# Patient Record
Sex: FEMALE | Race: BLACK OR AFRICAN AMERICAN | NOT HISPANIC OR LATINO | Employment: FULL TIME | URBAN - METROPOLITAN AREA
[De-identification: names, ages, dates, MRNs, and addresses within clinical notes are randomized per-mention and may not be internally consistent; named-entity substitution may affect disease eponyms.]

---

## 2017-01-09 ENCOUNTER — OFFICE VISIT (OUTPATIENT)
Dept: FAMILY MEDICINE | Facility: CLINIC | Age: 47
End: 2017-01-09
Payer: COMMERCIAL

## 2017-01-09 VITALS
OXYGEN SATURATION: 99 % | BODY MASS INDEX: 35.82 KG/M2 | DIASTOLIC BLOOD PRESSURE: 80 MMHG | HEART RATE: 72 BPM | TEMPERATURE: 98 F | SYSTOLIC BLOOD PRESSURE: 128 MMHG | HEIGHT: 63 IN | WEIGHT: 202.19 LBS | RESPIRATION RATE: 12 BRPM

## 2017-01-09 DIAGNOSIS — E78.5 HYPERLIPIDEMIA, UNSPECIFIED HYPERLIPIDEMIA TYPE: Primary | ICD-10-CM

## 2017-01-09 DIAGNOSIS — Z11.3 SCREEN FOR STD (SEXUALLY TRANSMITTED DISEASE): ICD-10-CM

## 2017-01-09 PROCEDURE — 1159F MED LIST DOCD IN RCRD: CPT | Mod: S$GLB,,, | Performed by: FAMILY MEDICINE

## 2017-01-09 PROCEDURE — 99999 PR PBB SHADOW E&M-EST. PATIENT-LVL III: CPT | Mod: PBBFAC,,, | Performed by: FAMILY MEDICINE

## 2017-01-09 PROCEDURE — 99213 OFFICE O/P EST LOW 20 MIN: CPT | Mod: S$GLB,,, | Performed by: FAMILY MEDICINE

## 2017-01-09 PROCEDURE — 3074F SYST BP LT 130 MM HG: CPT | Mod: S$GLB,,, | Performed by: FAMILY MEDICINE

## 2017-01-09 PROCEDURE — 3079F DIAST BP 80-89 MM HG: CPT | Mod: S$GLB,,, | Performed by: FAMILY MEDICINE

## 2017-01-09 RX ORDER — PANTOPRAZOLE SODIUM 40 MG/1
TABLET, DELAYED RELEASE ORAL
Refills: 1 | COMMUNITY
Start: 2016-12-20 | End: 2018-03-05

## 2017-01-09 NOTE — PROGRESS NOTES
Subjective:       Patient ID: Pat Leigh is a 46 y.o. female.    Chief Complaint: Hypertension    HPI:  Here to discuss labs results.  Patient with elevated cholesterol panel for several years.  Has chronic stable hypertension.  Does not monitor diet.   has Hep C.  Would like to be screened for HIV.  re  Review of Systems   Cardiovascular: Negative.    Genitourinary: Negative.        Objective:      Physical Exam   Constitutional: She appears well-developed and well-nourished.   Neurological: She is alert.     Lab Results   Component Value Date    CHOL 232 (H) 11/21/2016    CHOL 260 (H) 08/03/2016    CHOL 227 (H) 02/18/2015     Lab Results   Component Value Date    HDL 59 11/21/2016    HDL 68 08/03/2016    HDL 61 02/18/2015     Lab Results   Component Value Date    LDLCALC 153.0 11/21/2016    LDLCALC 169.8 (H) 08/03/2016    LDLCALC 146.2 02/18/2015     Lab Results   Component Value Date    TRIG 100 11/21/2016    TRIG 111 08/03/2016    TRIG 99 02/18/2015     Lab Results   Component Value Date    CHOLHDL 25.4 11/21/2016    CHOLHDL 26.2 08/03/2016    CHOLHDL 26.9 02/18/2015         Results for orders placed or performed in visit on 12/05/16   HEPATITIS PANEL, ACUTE   Result Value Ref Range    Hepatitis B Surface Ag Negative     Hep B C IgM Negative     Hep A IgM Negative     Hepatitis C Ab Negative      Assessment:       1. Hyperlipidemia, unspecified hyperlipidemia type    2. Screen for STD (sexually transmitted disease)        Plan:       Hyperlipidemia, unspecified hyperlipidemia type  Discussed importance of diet and exercise.  Low fat diet given.  LDL goal <130 due to hypertension    Screen for STD (sexually transmitted disease)  -     HIV-1 and HIV-2 antibodies; Future; Expected date: 1/9/17            No Follow-up on file.

## 2017-01-09 NOTE — PATIENT INSTRUCTIONS
Eating Heart-Healthy Foods  Eating has a big impact on your heart health. In fact, eating healthier can improve several of your heart risks at once. For instance, it helps you manage weight, cholesterol, and blood pressure. Here are ideas to help you make heart-healthy changes without giving up all the foods and flavors you love.  Getting started  · Talk with your health care provider about eating plans, such as the DASH or Mediterranean diet. You may also be referred to a dietitian.  · Change a few things at a time. Give yourself time to get used to a few eating changes before adding more.  · Work to create a tasty, healthy eating plan that you can stick to for the rest of your life.    Goals for healthy eating  Below are some tips to improve your eating habits:  · Limit saturated fats and trans fats. Saturated fats raise your levels of cholesterol, so keep these fats to a minimum. They are found in foods such as fatty meats, whole milk, cheese, and palm and coconut oils. Avoid trans fats because they lower good cholesterol as well as raise bad cholesterol. Trans fats are most often found in processed foods.  · Reduce sodium (salt) intake. Eating too much salt may increase your blood pressure. Limit your sodium intake to 2,300 milligrams (mg) per day, or less if your health care provider recommends it. Dining out less often and eating fewer processed foods are two great ways to decrease the amount of salt you consume.  · Managing calories. A calorie is a unit of energy. Your body burns calories for fuel, but if you eat more calories than your body burns, the extras are stored as fat. Your health care provider can help you create a diet plan to manage your calories. This will likely include eating healthier foods as well as exercising regularly. To help you track your progress, keep a diary to record what you eat and how often you exercise.  Choose the right foods  Aim to make these foods staples of your diet. If  you have diabetes, you may have different recommendations than what is listed here:  · Fruits and vegetable provide plenty of nutrients without a lot of calories. At meals, fill half your plate with these foods. Split the other half of your plate between whole grains and lean protein.  · Whole grains are high in fiber and rich in vitamins and nutrients. Good choices include whole-wheat bread, pasta, and brown rice.  · Lean proteins give you nutrition with less fat. Good choices include fish, skinless chicken, and beans.  · Low-fat or nonfat dairy provides nutrients without a lot of fat. Try low-fat or nonfat milk, cheese, or yogurt.  · Healthy fats can be good for you in small amounts. These are unsaturated fats, such as olive oil, nuts, and fish. Try to have at least 2 servings per week of fatty fish such as salmon, sardines, mackerel, rainbow trout, and albacore tuna. These contain omega-3 fatty acids, which are good for your heart. Flaxseed is another source of a heart-healthy fat.  More on heart healthy eating    Read food labels  Healthy eating starts at the grocery store. Be sure to pay attention to food labels on packaged foods. Look for products that are high in fiber and protein, and low in saturated fat, cholesterol, and sodium. Avoid products that contain trans fat. And pay close attention to serving size. For instance, if you plan to eat two servings, double all the numbers on the label.  Prepare food right  A key part of healthy cooking is cutting down on added fat and salt. Look on the internet for lower-fat, lower-sodium recipes. Also, try these tips:  · Remove fat from meat and skin from poultry before cooking.  · Skim fat from the surface of soups and sauces.  · Broil, boil, bake, steam, grill, and microwave food without added fats.  · Choose ingredients that spice up your food without adding calories, fat, or sodium. Try these items: horseradish, hot sauce, lemon, mustard, nonfat salad dressings,  and vinegar. For salt-free herbs and spices, try basil, cilantro, cinnamon, pepper, and rosemary.  © 6517-7922 [employer]. [address] [place] [address], [place] [address]. All rights reserved. This information is not intended as a substitute for professional medical care. Always follow your healthcare professional's instructions.        Low-Fat Diet    A low-fat diet will help you lose weight. It also can lower cholesterol and prevent symptoms of gallbladder disease. The average American diet contains up to 50% fat. This means that 50% of all calories come from fat (80 grams to 100 grams of fat per day). Choosing normal portions of foods from the list below can help lower your fat intake. The [place] [employer] recommends 20% to 35% of your daily calories come from fat. The American Heart Association suggests limiting the amount of unhealthy fats in your diet to fewer than 7% of calories from saturated fats and fewer than 1% from trans fats. The remaining 65% to 80% of calories will come from protein and carbohydrates. This is much healthier for you.  Beverages  Ok: Nonfat milk, coffee, tea, carbonated beverages  Avoid: Whole and reduced-fat milk, evaporated and condensed milk; hot chocolate mixes, milk shakes, malts, eggnog  Bread  Ok: Whole wheat or rye bread, lissy or soda crackers, jamia toast, plain rolls, bagels, English muffins  Avoid: Rolls and breads containing whole milk or egg, waffles, pancakes, biscuits, corn bread; cheese crackers, other flavored crackers, pastries, doughnuts  Cereal  Ok: Oatmeal, whole wheat, bran, multi grain, rice  Avoid: Granola or other cereals containing oil, coconut, or more than 2 grams of fat per serving  Desserts  Ok: Gelatin, slushy, stephy food cake, puddings or sherbet made with nonfat milk, meringues, and nonfat yogurt  Avoid: Any other commercially prepared desserts or desserts containing fat, whole milk, cream, chocolate, and coconut  Fats  Ok: You may have  "up to 3 teaspoons of fat daily. This can be in the form of butter, margarine, mayonnaise, or healthy oils (canola or olive)  Avoid: Cream, non-dairy creams, cream cheese, gravies, and cream sauces  Fruits  Ok: All fruits prepared without fat  Avoid: Coconut, olives  Meats, poultry, fish  Ok: Limit meat to 6 oz daily (broiled, roasted, baked, grilled, or boiled). Select lean cuts, well-trimmed of fat: beef, fish, lamb, pork and canned fish packed in water; chicken and turkey with the skin removed.  Avoid: Fried meats, fish, poultry, fried eggs and fish canned in oils; fatty meats such as conte, sausage, corned beef, hot dogs, luncheon meats, or meats with gravies and sauces  Cheese and eggs  Ok: Cheeses labeled "low fat"; 3 whole eggs per week, egg whites and egg substitutes as desired  Avoid: All other cheeses  Potatoes, beans, pasta  Ok: Dried beans, split peas, lentils, potatoes, rice, pasta prepared without added fat  Avoid: French fries, potato chips, potatoes prepared with butter, refried beans  Soups  Ok: Bouillon or broth soups without fat and with allowed vegetables  Avoid: Cream based soups  Vegetables  Ok: Fresh, frozen, canned or dried vegetables all prepared without added fat  Avoid: Fried vegetables and those prepared with butter, cream, sauces  Miscellaneous  Ok: Salt, sugar, jelly, hard candy, marshmallows, honey, syrup, spices and herbs, mustard, catsup, lemon, vinegar (to maintain an overall healthy diet, try to limit sweets and added sugars)  Avoid: Pizza, chocolate, nuts, coconut, cream candies, sunflower, sesame, and other seeds, fried foods, and cream sauces and gravies  © 3077-5747 [employer]. [address] [place] [address], [place] [address]. All rights reserved. This information is not intended as a substitute for professional medical care. Always follow your healthcare professional's instructions.        "

## 2017-08-16 DIAGNOSIS — I10 ESSENTIAL HYPERTENSION: ICD-10-CM

## 2017-08-16 RX ORDER — AMLODIPINE BESYLATE 5 MG/1
5 TABLET ORAL DAILY
Qty: 90 TABLET | Refills: 0 | Status: SHIPPED | OUTPATIENT
Start: 2017-08-16 | End: 2018-02-15 | Stop reason: SDUPTHER

## 2017-10-20 DIAGNOSIS — A60.00 GENITAL HERPES: ICD-10-CM

## 2017-10-20 RX ORDER — VALACYCLOVIR HYDROCHLORIDE 500 MG/1
500 TABLET, FILM COATED ORAL DAILY
Qty: 90 TABLET | Refills: 3 | Status: SHIPPED | OUTPATIENT
Start: 2017-10-20 | End: 2019-02-26 | Stop reason: SDUPTHER

## 2018-02-15 DIAGNOSIS — I10 ESSENTIAL HYPERTENSION: ICD-10-CM

## 2018-02-16 RX ORDER — AMLODIPINE BESYLATE 5 MG/1
5 TABLET ORAL DAILY
Qty: 90 TABLET | Refills: 0 | Status: SHIPPED | OUTPATIENT
Start: 2018-02-16 | End: 2018-03-05 | Stop reason: SDUPTHER

## 2018-02-27 NOTE — TELEPHONE ENCOUNTER
Spoke with patient. Notified need of office visit.Verbalized understanding. Appointment scheduled at this time.

## 2018-03-05 ENCOUNTER — OFFICE VISIT (OUTPATIENT)
Dept: FAMILY MEDICINE | Facility: CLINIC | Age: 48
End: 2018-03-05
Payer: COMMERCIAL

## 2018-03-05 VITALS
HEART RATE: 85 BPM | SYSTOLIC BLOOD PRESSURE: 124 MMHG | DIASTOLIC BLOOD PRESSURE: 70 MMHG | BODY MASS INDEX: 37.03 KG/M2 | WEIGHT: 209 LBS | OXYGEN SATURATION: 99 % | RESPIRATION RATE: 16 BRPM | TEMPERATURE: 98 F | HEIGHT: 63 IN

## 2018-03-05 DIAGNOSIS — R10.32 LLQ ABDOMINAL PAIN: ICD-10-CM

## 2018-03-05 DIAGNOSIS — R10.32 LLQ PAIN: ICD-10-CM

## 2018-03-05 DIAGNOSIS — Z00.00 WELL ADULT EXAM: Primary | ICD-10-CM

## 2018-03-05 DIAGNOSIS — I10 BENIGN ESSENTIAL HYPERTENSION: ICD-10-CM

## 2018-03-05 DIAGNOSIS — R10.2 PELVIC PAIN IN FEMALE: ICD-10-CM

## 2018-03-05 PROCEDURE — 99999 PR PBB SHADOW E&M-EST. PATIENT-LVL IV: CPT | Mod: PBBFAC,,, | Performed by: PHYSICIAN ASSISTANT

## 2018-03-05 PROCEDURE — 81002 URINALYSIS NONAUTO W/O SCOPE: CPT | Mod: S$GLB,,, | Performed by: PHYSICIAN ASSISTANT

## 2018-03-05 PROCEDURE — 99396 PREV VISIT EST AGE 40-64: CPT | Mod: 25,S$GLB,, | Performed by: PHYSICIAN ASSISTANT

## 2018-03-05 RX ORDER — AMLODIPINE BESYLATE 5 MG/1
5 TABLET ORAL DAILY
Qty: 90 TABLET | Refills: 0 | Status: SHIPPED | OUTPATIENT
Start: 2018-03-05 | End: 2018-06-06 | Stop reason: SDUPTHER

## 2018-03-05 RX ORDER — OMEPRAZOLE 20 MG/1
20 CAPSULE, DELAYED RELEASE ORAL DAILY
COMMUNITY
End: 2022-05-11

## 2018-03-05 NOTE — PROGRESS NOTES
Subjective:       Patient ID: Pat Yung is a 47 y.o. female.    Chief Complaint: Annual Exam    HPI: 48 yo female with HTN presents for annual exam. HTN controlled on meds. Mammo and pap UTD. Complains of LLQ abdominal pain. Had one episode of painful intercourse. Denies diarrhea, constipation. She does suffer with excess gas. Nothing worsens pain or makes it better.     Review of Systems   Constitutional: Negative for chills, diaphoresis, fever and unexpected weight change.   Respiratory: Negative for cough and shortness of breath.    Cardiovascular: Negative for chest pain.   Gastrointestinal: Positive for abdominal pain. Negative for constipation and diarrhea.   Genitourinary: Negative for dysuria, frequency and vaginal discharge.   Neurological: Negative for headaches.   Psychiatric/Behavioral: Negative for decreased concentration, dysphoric mood and sleep disturbance. The patient is not nervous/anxious.        Objective:      Physical Exam   Constitutional: She is oriented to person, place, and time. She appears well-developed and well-nourished. No distress.   HENT:   Head: Normocephalic and atraumatic.   Eyes: EOM are normal. Pupils are equal, round, and reactive to light.   Cardiovascular: Normal rate and regular rhythm.    Pulmonary/Chest: Effort normal and breath sounds normal.   Abdominal: Soft. There is tenderness in the left lower quadrant. There is guarding.   Lymphadenopathy:        Head (right side): No submental, no submandibular and no tonsillar adenopathy present.        Head (left side): No submental, no submandibular and no tonsillar adenopathy present.   Neurological: She is alert and oriented to person, place, and time.   Skin: Skin is warm and dry. She is not diaphoretic.   Psychiatric: She has a normal mood and affect. Her behavior is normal.   Vitals reviewed.      Assessment:       1. Well adult exam    2. LLQ abdominal pain    3. Pelvic pain in female    4. Benign essential  hypertension    5. LLQ pain        Plan:         Pat was seen today for annual exam.    Diagnoses and all orders for this visit:    Well adult exam  -     Lipid panel; Future  -     Comprehensive metabolic panel; Future  -     RPR; Future  -     HIV-1 and HIV-2 antibodies; Future  -     TSH; Future  -     CBC auto differential; Future  -     Vitamin D; Future    LLQ abdominal pain  -     CT Abdomen Pelvis With Contrast; Future    Pelvic pain in female  -     POCT URINE DIPSTICK WITHOUT MICROSCOPE    Benign essential hypertension  -     amLODIPine (NORVASC) 5 MG tablet; Take 1 tablet (5 mg total) by mouth once daily.    LLQ pain  -     CT Abdomen Pelvis With Contrast; Future

## 2018-03-06 LAB
BILIRUB SERPL-MCNC: NORMAL MG/DL
BLOOD URINE, POC: NORMAL
COLOR, POC UA: YELLOW
GLUCOSE UR QL STRIP: NORMAL
KETONES UR QL STRIP: NORMAL
LEUKOCYTE ESTERASE URINE, POC: NORMAL
NITRITE, POC UA: NORMAL
PH, POC UA: 5
PROTEIN, POC: NORMAL
SPECIFIC GRAVITY, POC UA: 1.01
UROBILINOGEN, POC UA: NORMAL

## 2018-03-09 ENCOUNTER — HOSPITAL ENCOUNTER (OUTPATIENT)
Dept: RADIOLOGY | Facility: HOSPITAL | Age: 48
Discharge: HOME | End: 2018-03-09
Attending: PHYSICIAN ASSISTANT
Payer: COMMERCIAL

## 2018-03-09 DIAGNOSIS — R10.32 LLQ PAIN: ICD-10-CM

## 2018-03-09 DIAGNOSIS — R10.32 LLQ ABDOMINAL PAIN: ICD-10-CM

## 2018-03-09 PROCEDURE — 25500020 PHARM REV CODE 255: Performed by: PHYSICIAN ASSISTANT

## 2018-03-09 PROCEDURE — 74176 CT ABD & PELVIS W/O CONTRAST: CPT | Mod: TC

## 2018-03-09 PROCEDURE — 74176 CT ABD & PELVIS W/O CONTRAST: CPT | Mod: 26,,, | Performed by: RADIOLOGY

## 2018-03-09 RX ADMIN — IOHEXOL 15 ML: 300 INJECTION, SOLUTION INTRAVENOUS at 11:03

## 2018-06-06 DIAGNOSIS — I10 BENIGN ESSENTIAL HYPERTENSION: ICD-10-CM

## 2018-06-06 RX ORDER — AMLODIPINE BESYLATE 5 MG/1
5 TABLET ORAL DAILY
Qty: 90 TABLET | Refills: 0 | Status: SHIPPED | OUTPATIENT
Start: 2018-06-06 | End: 2019-06-07 | Stop reason: SDUPTHER

## 2018-09-28 DIAGNOSIS — Z12.39 BREAST CANCER SCREENING: ICD-10-CM

## 2019-02-26 DIAGNOSIS — A60.00 GENITAL HERPES: ICD-10-CM

## 2019-02-26 RX ORDER — VALACYCLOVIR HYDROCHLORIDE 500 MG/1
500 TABLET, FILM COATED ORAL DAILY
Qty: 90 TABLET | Refills: 0 | Status: SHIPPED | OUTPATIENT
Start: 2019-02-26 | End: 2019-06-07 | Stop reason: SDUPTHER

## 2019-05-14 DIAGNOSIS — I10 BENIGN ESSENTIAL HYPERTENSION: ICD-10-CM

## 2019-05-14 RX ORDER — AMLODIPINE BESYLATE 5 MG/1
5 TABLET ORAL DAILY
Qty: 90 TABLET | Refills: 0 | OUTPATIENT
Start: 2019-05-14

## 2019-06-07 DIAGNOSIS — A60.00 GENITAL HERPES: ICD-10-CM

## 2019-06-07 DIAGNOSIS — I10 BENIGN ESSENTIAL HYPERTENSION: ICD-10-CM

## 2019-06-07 RX ORDER — AMLODIPINE BESYLATE 5 MG/1
5 TABLET ORAL DAILY
Qty: 30 TABLET | Refills: 0 | Status: SHIPPED | OUTPATIENT
Start: 2019-06-07 | End: 2019-11-08 | Stop reason: ALTCHOICE

## 2019-06-07 RX ORDER — VALACYCLOVIR HYDROCHLORIDE 500 MG/1
500 TABLET, FILM COATED ORAL DAILY
Qty: 30 TABLET | Refills: 0 | Status: SHIPPED | OUTPATIENT
Start: 2019-06-07 | End: 2019-11-08 | Stop reason: SDUPTHER

## 2019-07-25 DIAGNOSIS — I10 BENIGN ESSENTIAL HYPERTENSION: ICD-10-CM

## 2019-07-25 DIAGNOSIS — A60.00 GENITAL HERPES: ICD-10-CM

## 2019-07-25 RX ORDER — VALACYCLOVIR HYDROCHLORIDE 500 MG/1
TABLET, FILM COATED ORAL
Qty: 30 TABLET | Refills: 0 | OUTPATIENT
Start: 2019-07-25

## 2019-07-25 RX ORDER — AMLODIPINE BESYLATE 5 MG/1
TABLET ORAL
Qty: 30 TABLET | Refills: 0 | OUTPATIENT
Start: 2019-07-25

## 2019-08-14 DIAGNOSIS — I10 BENIGN ESSENTIAL HYPERTENSION: ICD-10-CM

## 2019-08-14 DIAGNOSIS — A60.00 GENITAL HERPES: ICD-10-CM

## 2019-08-14 RX ORDER — AMLODIPINE BESYLATE 5 MG/1
TABLET ORAL
Qty: 30 TABLET | Refills: 0 | OUTPATIENT
Start: 2019-08-14

## 2019-08-14 RX ORDER — VALACYCLOVIR HYDROCHLORIDE 500 MG/1
TABLET, FILM COATED ORAL
Qty: 30 TABLET | Refills: 0 | OUTPATIENT
Start: 2019-08-14

## 2019-10-28 ENCOUNTER — HOSPITAL ENCOUNTER (OUTPATIENT)
Dept: RADIOLOGY | Facility: HOSPITAL | Age: 49
Discharge: HOME | End: 2019-10-28
Attending: FAMILY MEDICINE
Payer: COMMERCIAL

## 2019-10-28 ENCOUNTER — TELEPHONE (OUTPATIENT)
Dept: FAMILY MEDICINE | Facility: CLINIC | Age: 49
End: 2019-10-28

## 2019-10-28 VITALS — HEIGHT: 64 IN | BODY MASS INDEX: 35.87 KG/M2 | WEIGHT: 210.13 LBS

## 2019-10-28 DIAGNOSIS — Z12.31 ENCOUNTER FOR SCREENING MAMMOGRAM FOR BREAST CANCER: Primary | ICD-10-CM

## 2019-10-28 DIAGNOSIS — Z12.31 ENCOUNTER FOR SCREENING MAMMOGRAM FOR BREAST CANCER: ICD-10-CM

## 2019-10-28 PROCEDURE — 77067 SCR MAMMO BI INCL CAD: CPT | Mod: TC

## 2019-10-28 PROCEDURE — 77063 MAMMO DIGITAL SCREENING BILAT WITH TOMOSYNTHESIS_CAD: ICD-10-PCS | Mod: 26,,, | Performed by: RADIOLOGY

## 2019-10-28 PROCEDURE — 77067 MAMMO DIGITAL SCREENING BILAT WITH TOMOSYNTHESIS_CAD: ICD-10-PCS | Mod: 26,,, | Performed by: RADIOLOGY

## 2019-10-28 PROCEDURE — 77067 SCR MAMMO BI INCL CAD: CPT | Mod: 26,,, | Performed by: RADIOLOGY

## 2019-10-28 PROCEDURE — 77063 BREAST TOMOSYNTHESIS BI: CPT | Mod: 26,,, | Performed by: RADIOLOGY

## 2019-10-28 NOTE — TELEPHONE ENCOUNTER
Per Maggie, patient is scheduled for mammo today, however order is , patient last mammo 16  Order placed per [facility]

## 2019-11-05 ENCOUNTER — TELEPHONE (OUTPATIENT)
Dept: FAMILY MEDICINE | Facility: CLINIC | Age: 49
End: 2019-11-05

## 2019-11-05 DIAGNOSIS — Z00.00 ANNUAL PHYSICAL EXAM: ICD-10-CM

## 2019-11-05 DIAGNOSIS — I10 BENIGN ESSENTIAL HYPERTENSION: Primary | ICD-10-CM

## 2019-11-05 NOTE — TELEPHONE ENCOUNTER
Pt has physical scheduled for Friday with you; requesting lab orders; lipid and cmp entered per [facility]; do you want to add any other labs

## 2019-11-05 NOTE — TELEPHONE ENCOUNTER
----- Message from Nacho Burgos sent at 11/5/2019 10:09 AM CST -----  Contact: Self  Type: Lab    Caller is requesting to schedule their Lab appointment prior to annual appointment.    Order is not listed in EPIC.  Please enter order and contact patient to schedule.    Name of Caller Self    Preferred Date and Time of Labs:any    Date of [facility] Appointment:11/08/19    Where would they like the lab performed [facility]    Would the patient rather a call back or a response via [facility]? call  Best Call Back Number: [phone]

## 2019-11-05 NOTE — TELEPHONE ENCOUNTER
----- Message from Luana White sent at 11/5/2019 12:30 PM CST -----  Contact: Patient ph [phone]  Type:  Patient Returning Call    Who Called: Patient    Who Left Message for Patient: Annette    Does the patient know what this is regarding?: No     Would the patient rather a call back or a response via [facility]? Call back    Best Call Back Number: [phone]

## 2019-11-07 ENCOUNTER — LAB VISIT (OUTPATIENT)
Dept: LAB | Facility: HOSPITAL | Age: 49
End: 2019-11-07
Attending: FAMILY MEDICINE
Payer: COMMERCIAL

## 2019-11-07 DIAGNOSIS — Z00.00 ANNUAL PHYSICAL EXAM: ICD-10-CM

## 2019-11-07 DIAGNOSIS — I10 BENIGN ESSENTIAL HYPERTENSION: ICD-10-CM

## 2019-11-07 LAB
25(OH)D3+25(OH)D2 SERPL-MCNC: 19 NG/ML (ref 30–96)
ALBUMIN SERPL BCP-MCNC: 3.8 G/DL (ref 3.5–5.2)
ALP SERPL-CCNC: 88 U/L (ref 55–135)
ALT SERPL W/O P-5'-P-CCNC: 15 U/L (ref 10–44)
ANION GAP SERPL CALC-SCNC: 6 MMOL/L (ref 8–16)
AST SERPL-CCNC: 17 U/L (ref 10–40)
BILIRUB SERPL-MCNC: 0.3 MG/DL (ref 0.1–1)
BUN SERPL-MCNC: 14 MG/DL (ref 6–20)
CALCIUM SERPL-MCNC: 9.6 MG/DL (ref 8.7–10.5)
CHLORIDE SERPL-SCNC: 105 MMOL/L (ref 95–110)
CHOLEST SERPL-MCNC: 231 MG/DL (ref 120–199)
CHOLEST/HDLC SERPL: 3.5 {RATIO} (ref 2–5)
CO2 SERPL-SCNC: 28 MMOL/L (ref 23–29)
CREAT SERPL-MCNC: 0.7 MG/DL (ref 0.5–1.4)
EST. GFR  (AFRICAN AMERICAN): >60 ML/MIN/1.73 M^2
EST. GFR  (NON AFRICAN AMERICAN): >60 ML/MIN/1.73 M^2
GLUCOSE SERPL-MCNC: 90 MG/DL (ref 70–110)
HDLC SERPL-MCNC: 66 MG/DL (ref 40–75)
HDLC SERPL: 28.6 % (ref 20–50)
LDLC SERPL CALC-MCNC: 149.2 MG/DL (ref 63–159)
NONHDLC SERPL-MCNC: 165 MG/DL
POTASSIUM SERPL-SCNC: 3.8 MMOL/L (ref 3.5–5.1)
PROT SERPL-MCNC: 8 G/DL (ref 6–8.4)
SODIUM SERPL-SCNC: 139 MMOL/L (ref 136–145)
T4 FREE SERPL-MCNC: 1.07 NG/DL (ref 0.71–1.51)
TRIGL SERPL-MCNC: 79 MG/DL (ref 30–150)
TSH SERPL DL<=0.005 MIU/L-ACNC: 0.6 UIU/ML (ref 0.4–4)

## 2019-11-07 PROCEDURE — 36415 COLL VENOUS BLD VENIPUNCTURE: CPT | Mod: PO

## 2019-11-07 PROCEDURE — 82306 VITAMIN D 25 HYDROXY: CPT

## 2019-11-07 PROCEDURE — 80053 COMPREHEN METABOLIC PANEL: CPT

## 2019-11-07 PROCEDURE — 80061 LIPID PANEL: CPT

## 2019-11-07 PROCEDURE — 84439 ASSAY OF FREE THYROXINE: CPT

## 2019-11-07 PROCEDURE — 84443 ASSAY THYROID STIM HORMONE: CPT

## 2019-11-08 ENCOUNTER — OFFICE VISIT (OUTPATIENT)
Dept: FAMILY MEDICINE | Facility: CLINIC | Age: 49
End: 2019-11-08
Payer: COMMERCIAL

## 2019-11-08 VITALS
HEART RATE: 72 BPM | TEMPERATURE: 98 F | WEIGHT: 210.75 LBS | HEIGHT: 64 IN | DIASTOLIC BLOOD PRESSURE: 80 MMHG | OXYGEN SATURATION: 98 % | BODY MASS INDEX: 35.98 KG/M2 | RESPIRATION RATE: 16 BRPM | SYSTOLIC BLOOD PRESSURE: 130 MMHG

## 2019-11-08 DIAGNOSIS — H91.92 HEARING LOSS OF LEFT EAR, UNSPECIFIED HEARING LOSS TYPE: ICD-10-CM

## 2019-11-08 DIAGNOSIS — E55.9 VITAMIN D DEFICIENCY: ICD-10-CM

## 2019-11-08 DIAGNOSIS — A60.00 GENITAL HERPES: ICD-10-CM

## 2019-11-08 DIAGNOSIS — Z12.4 CERVICAL CANCER SCREENING: ICD-10-CM

## 2019-11-08 DIAGNOSIS — Z00.00 ANNUAL PHYSICAL EXAM: Primary | ICD-10-CM

## 2019-11-08 DIAGNOSIS — I10 BENIGN ESSENTIAL HYPERTENSION: ICD-10-CM

## 2019-11-08 PROCEDURE — 3079F PR MOST RECENT DIASTOLIC BLOOD PRESSURE 80-89 MM HG: ICD-10-PCS | Mod: CPTII,S$GLB,, | Performed by: PHYSICIAN ASSISTANT

## 2019-11-08 PROCEDURE — 3079F DIAST BP 80-89 MM HG: CPT | Mod: CPTII,S$GLB,, | Performed by: PHYSICIAN ASSISTANT

## 2019-11-08 PROCEDURE — 99999 PR PBB SHADOW E&M-EST. PATIENT-LVL III: ICD-10-PCS | Mod: PBBFAC,,, | Performed by: PHYSICIAN ASSISTANT

## 2019-11-08 PROCEDURE — 87624 HPV HI-RISK TYP POOLED RSLT: CPT

## 2019-11-08 PROCEDURE — 88175 CYTOPATH C/V AUTO FLUID REDO: CPT

## 2019-11-08 PROCEDURE — 99396 PR PREVENTIVE VISIT,EST,40-64: ICD-10-PCS | Mod: S$GLB,,, | Performed by: PHYSICIAN ASSISTANT

## 2019-11-08 PROCEDURE — 99396 PREV VISIT EST AGE 40-64: CPT | Mod: S$GLB,,, | Performed by: PHYSICIAN ASSISTANT

## 2019-11-08 PROCEDURE — 3075F SYST BP GE 130 - 139MM HG: CPT | Mod: CPTII,S$GLB,, | Performed by: PHYSICIAN ASSISTANT

## 2019-11-08 PROCEDURE — 3075F PR MOST RECENT SYSTOLIC BLOOD PRESS GE 130-139MM HG: ICD-10-PCS | Mod: CPTII,S$GLB,, | Performed by: PHYSICIAN ASSISTANT

## 2019-11-08 PROCEDURE — 99999 PR PBB SHADOW E&M-EST. PATIENT-LVL III: CPT | Mod: PBBFAC,,, | Performed by: PHYSICIAN ASSISTANT

## 2019-11-08 RX ORDER — VALACYCLOVIR HYDROCHLORIDE 500 MG/1
500 TABLET, FILM COATED ORAL DAILY
Qty: 30 TABLET | Refills: 11 | Status: SHIPPED | OUTPATIENT
Start: 2019-11-08 | End: 2020-12-10 | Stop reason: SDUPTHER

## 2019-11-08 NOTE — PROGRESS NOTES
Subjective:       Patient ID: Pat Yung is a 49 y.o. female.    Chief Complaint: Annual Exam    HPI: 48 yo female presents for annual exam. She has noticed decreased hearing in her left ear. She has been trying to eat more fruit and veggies and switching to whole grain. She is active with her job and plans on starting an exercise routine. LMP 2 years ago. Gets hot flashes. No estrogen. Had abnormal pap many years ago, no biopsy. No complaints.     Review of Systems   Constitutional: Negative for fever and unexpected weight change.   Eyes: Negative for visual disturbance.   Respiratory: Negative for cough and shortness of breath.    Cardiovascular: Negative for chest pain.   Gastrointestinal: Negative for abdominal pain, constipation and diarrhea.   Genitourinary: Negative for decreased urine volume, dysuria, frequency, menstrual problem, vaginal bleeding and vaginal discharge.   Skin: Negative for rash.   Neurological: Negative for headaches.   Psychiatric/Behavioral: Negative for dysphoric mood and sleep disturbance. The patient is not nervous/anxious.        Objective:      Physical Exam   Constitutional: She is oriented to person, place, and time. She appears well-developed and well-nourished.   HENT:   Head: Normocephalic and atraumatic.   Cardiovascular: Normal rate and regular rhythm.   Pulmonary/Chest: Effort normal and breath sounds normal.   Genitourinary: Vagina normal. There is no rash or tenderness on the right labia. There is no rash or tenderness on the left labia. No tenderness in the vagina. No vaginal discharge found.   Neurological: She is alert and oriented to person, place, and time.   Skin: Skin is warm and dry. She is not diaphoretic.   Psychiatric: She has a normal mood and affect. Her behavior is normal.   Vitals reviewed.      Assessment:       1. Annual physical exam    2. Cervical cancer screening    3. Hearing loss of left ear, unspecified hearing loss type        Plan:          Pat was seen today for annual exam.    Diagnoses and all orders for this visit:    Annual physical exam  -   Continue diet and exercise, dental exam. Limit alcohol     Cervical cancer screening  -     Liquid-based pap smear, screening  -     HPV High Risk Genotypes, PCR    Hearing loss of left ear, unspecified hearing loss type  -     Ambulatory Referral to Audiology    Vitamin D deficiency  -   1200 mg D3 per day    Benign essential hypertension  Has not been on med for months, will do trial off since working on diet and exercise     Genital herpes  -     valACYclovir (VALTREX) 500 MG tablet; Take 1 tablet (500 mg total) by mouth once daily.

## 2019-11-14 LAB
HPV HR 12 DNA CVX QL NAA+PROBE: NEGATIVE
HPV16 AG SPEC QL: NEGATIVE
HPV18 DNA SPEC QL NAA+PROBE: NEGATIVE

## 2020-01-10 ENCOUNTER — PATIENT OUTREACH (OUTPATIENT)
Dept: ADMINISTRATIVE | Facility: OTHER | Age: 50
End: 2020-01-10

## 2020-03-19 ENCOUNTER — NURSE TRIAGE (OUTPATIENT)
Dept: ADMINISTRATIVE | Facility: CLINIC | Age: 50
End: 2020-03-19

## 2020-03-19 NOTE — TELEPHONE ENCOUNTER
Cough, diarrhea. Was on a cruise from 2/22/20-2/29/20; went to [place]. Referred to [facility]. Wants to be tested.     Reason for Disposition   Health Information question, no triage required and triager able to answer question    Protocols used: ST INFORMATION ONLY CALL-A-AH

## 2020-05-04 DIAGNOSIS — R10.9 ABDOMINAL PAIN, UNSPECIFIED ABDOMINAL LOCATION: Primary | ICD-10-CM

## 2020-05-08 ENCOUNTER — TELEPHONE (OUTPATIENT)
Dept: URGENT CARE | Facility: CLINIC | Age: 50
End: 2020-05-08

## 2020-05-11 ENCOUNTER — TELEPHONE (OUTPATIENT)
Dept: URGENT CARE | Facility: CLINIC | Age: 50
End: 2020-05-11

## 2020-05-11 NOTE — TELEPHONE ENCOUNTER
Patient called in requesting lab results. Notified of negative stool results. No questions at time of call. Instructed to f.u with pcp. jennifer

## 2020-09-21 ENCOUNTER — OFFICE VISIT (OUTPATIENT)
Dept: FAMILY MEDICINE | Facility: CLINIC | Age: 50
End: 2020-09-21
Payer: COMMERCIAL

## 2020-09-21 VITALS
DIASTOLIC BLOOD PRESSURE: 86 MMHG | HEIGHT: 64 IN | RESPIRATION RATE: 17 BRPM | SYSTOLIC BLOOD PRESSURE: 124 MMHG | TEMPERATURE: 99 F | WEIGHT: 209.19 LBS | HEART RATE: 90 BPM | BODY MASS INDEX: 35.71 KG/M2 | OXYGEN SATURATION: 99 %

## 2020-09-21 DIAGNOSIS — N30.00 ACUTE CYSTITIS WITHOUT HEMATURIA: Primary | ICD-10-CM

## 2020-09-21 DIAGNOSIS — Z12.11 COLON CANCER SCREENING: ICD-10-CM

## 2020-09-21 LAB
BILIRUB SERPL-MCNC: ABNORMAL MG/DL
BLOOD URINE, POC: ABNORMAL
CLARITY, POC UA: ABNORMAL
COLOR, POC UA: YELLOW
GLUCOSE UR QL STRIP: NORMAL
KETONES UR QL STRIP: ABNORMAL
LEUKOCYTE ESTERASE URINE, POC: ABNORMAL
NITRITE, POC UA: ABNORMAL
PH, POC UA: 5
PROTEIN, POC: ABNORMAL
SPECIFIC GRAVITY, POC UA: 1.01
UROBILINOGEN, POC UA: NORMAL

## 2020-09-21 PROCEDURE — 87086 URINE CULTURE/COLONY COUNT: CPT

## 2020-09-21 PROCEDURE — 3074F SYST BP LT 130 MM HG: CPT | Mod: CPTII,S$GLB,, | Performed by: PHYSICIAN ASSISTANT

## 2020-09-21 PROCEDURE — 99214 PR OFFICE/OUTPT VISIT, EST, LEVL IV, 30-39 MIN: ICD-10-PCS | Mod: 25,S$GLB,, | Performed by: PHYSICIAN ASSISTANT

## 2020-09-21 PROCEDURE — 3079F DIAST BP 80-89 MM HG: CPT | Mod: CPTII,S$GLB,, | Performed by: PHYSICIAN ASSISTANT

## 2020-09-21 PROCEDURE — 99214 OFFICE O/P EST MOD 30 MIN: CPT | Mod: 25,S$GLB,, | Performed by: PHYSICIAN ASSISTANT

## 2020-09-21 PROCEDURE — 99999 PR PBB SHADOW E&M-EST. PATIENT-LVL IV: CPT | Mod: PBBFAC,,, | Performed by: PHYSICIAN ASSISTANT

## 2020-09-21 PROCEDURE — 3074F PR MOST RECENT SYSTOLIC BLOOD PRESSURE < 130 MM HG: ICD-10-PCS | Mod: CPTII,S$GLB,, | Performed by: PHYSICIAN ASSISTANT

## 2020-09-21 PROCEDURE — 3079F PR MOST RECENT DIASTOLIC BLOOD PRESSURE 80-89 MM HG: ICD-10-PCS | Mod: CPTII,S$GLB,, | Performed by: PHYSICIAN ASSISTANT

## 2020-09-21 PROCEDURE — 81002 POCT URINE DIPSTICK WITHOUT MICROSCOPE: ICD-10-PCS | Mod: S$GLB,,, | Performed by: PHYSICIAN ASSISTANT

## 2020-09-21 PROCEDURE — 99999 PR PBB SHADOW E&M-EST. PATIENT-LVL IV: ICD-10-PCS | Mod: PBBFAC,,, | Performed by: PHYSICIAN ASSISTANT

## 2020-09-21 PROCEDURE — 3008F BODY MASS INDEX DOCD: CPT | Mod: CPTII,S$GLB,, | Performed by: PHYSICIAN ASSISTANT

## 2020-09-21 PROCEDURE — 3008F PR BODY MASS INDEX (BMI) DOCUMENTED: ICD-10-PCS | Mod: CPTII,S$GLB,, | Performed by: PHYSICIAN ASSISTANT

## 2020-09-21 PROCEDURE — 81002 URINALYSIS NONAUTO W/O SCOPE: CPT | Mod: S$GLB,,, | Performed by: PHYSICIAN ASSISTANT

## 2020-09-21 RX ORDER — NITROFURANTOIN 25; 75 MG/1; MG/1
100 CAPSULE ORAL 2 TIMES DAILY
Qty: 14 CAPSULE | Refills: 0 | Status: SHIPPED | OUTPATIENT
Start: 2020-09-21 | End: 2020-09-28

## 2020-09-21 RX ORDER — HYDROCODONE BITARTRATE AND ACETAMINOPHEN 5; 325 MG/1; MG/1
TABLET ORAL
COMMUNITY
Start: 2020-07-30 | End: 2022-05-11

## 2020-09-21 RX ORDER — DIAZEPAM 5 MG/1
TABLET ORAL
COMMUNITY
Start: 2020-07-15 | End: 2022-05-11

## 2020-09-21 RX ORDER — PROMETHAZINE HYDROCHLORIDE AND DEXTROMETHORPHAN HYDROBROMIDE 6.25; 15 MG/5ML; MG/5ML
SYRUP ORAL
COMMUNITY
Start: 2020-03-19 | End: 2022-04-22

## 2020-09-21 NOTE — PROGRESS NOTES
Answers for HPI/ROS submitted by the patient on 9/17/2020   Dysuria  Chronicity: new  Onset: in the past 7 days  Frequency: every urination  Progression since onset: waxing and waning  Pain quality: burning  Pain - numeric: 4/10  Fever: no fever  Sexually active?: Yes  History of pyelonephritis?: No  chills: No  discharge: Yes  flank pain: Yes  frequency: Yes  hematuria: No  hesitancy: No  nausea: No  possible pregnancy: No  sweats: No  urgency: Yes  vomiting: No  constipation: No  rash: Yes  weight loss: No  withholding: No  behavior changes: No  Treatments tried: nothing  Pain severity: mild  catheterization: No  diabetes insipidus: No  diabetes mellitus: No  genitourinary reflux: No  hypertension: No  recurrent UTIs: No  single kidney: No  STD: No  urinary stasis: No  urological procedure: No  kidney stones: No

## 2020-09-21 NOTE — PROGRESS NOTES
Subjective:       Patient ID: Pat Yung is a 50 y.o. female.    Chief Complaint: Urinary Tract Infection    Dysuria   This is a new problem. The current episode started in the past 7 days. The problem occurs every urination. The problem has been waxing and waning. The quality of the pain is described as burning. The pain is at a severity of 4/10. The pain is mild. There has been no fever. She is sexually active. There is no history of pyelonephritis. Associated symptoms include a discharge, flank pain, frequency, urgency and rash. Pertinent negatives include no behavior changes, chills, hematuria, hesitancy, nausea, possible pregnancy, sweats, vomiting, weight loss, constipation or withholding. She has tried increased fluids for the symptoms. The treatment provided no relief. There is no history of catheterization, diabetes insipidus, diabetes mellitus, genitourinary reflux, hypertension, kidney stones, recurrent UTIs, a single kidney, STD, urinary stasis or a urological procedure.     Review of Systems   Constitutional: Negative for chills and weight loss.   Gastrointestinal: Negative for constipation, nausea and vomiting.   Genitourinary: Positive for dysuria, flank pain, frequency and urgency. Negative for hematuria and hesitancy.   Integumentary:  Positive for rash.         Objective:      Physical Exam  Constitutional:       General: She is not in acute distress.     Appearance: Normal appearance. She is obese.   HENT:      Head: Normocephalic and atraumatic.   Skin:     General: Skin is warm and dry.   Neurological:      General: No focal deficit present.      Mental Status: She is alert and oriented to person, place, and time.   Psychiatric:         Mood and Affect: Mood normal.         Behavior: Behavior normal.         Assessment:       1. Acute cystitis without hematuria    2. Colon cancer screening        Plan:         Pat was seen today for urinary tract infection.    Diagnoses and all orders  for this visit:    Acute cystitis without hematuria  -     POCT URINE DIPSTICK WITHOUT MICROSCOPE  -     Urine culture  -     nitrofurantoin, macrocrystal-monohydrate, (MACROBID) 100 MG capsule; Take 1 capsule (100 mg total) by mouth 2 (two) times daily. for 7 days  -     advised her to push fluids, and call if no relief    Colon cancer screening  -     Case request GI: COLONOSCOPY

## 2020-09-23 LAB — BACTERIA UR CULT: NO GROWTH

## 2020-09-29 ENCOUNTER — PATIENT MESSAGE (OUTPATIENT)
Dept: FAMILY MEDICINE | Facility: CLINIC | Age: 50
End: 2020-09-29

## 2020-09-29 DIAGNOSIS — B37.9 YEAST INFECTION: Primary | ICD-10-CM

## 2020-09-30 RX ORDER — NYSTATIN 100000 U/G
CREAM TOPICAL 2 TIMES DAILY
Qty: 30 G | Refills: 0 | Status: SHIPPED | OUTPATIENT
Start: 2020-09-30 | End: 2022-05-11

## 2020-09-30 RX ORDER — FLUCONAZOLE 150 MG/1
150 TABLET ORAL DAILY
Qty: 1 TABLET | Refills: 0 | Status: SHIPPED | OUTPATIENT
Start: 2020-09-30 | End: 2020-10-01

## 2020-10-04 ENCOUNTER — PATIENT MESSAGE (OUTPATIENT)
Dept: FAMILY MEDICINE | Facility: CLINIC | Age: 50
End: 2020-10-04

## 2020-10-06 ENCOUNTER — OFFICE VISIT (OUTPATIENT)
Dept: FAMILY MEDICINE | Facility: CLINIC | Age: 50
End: 2020-10-06
Payer: COMMERCIAL

## 2020-10-06 VITALS
SYSTOLIC BLOOD PRESSURE: 142 MMHG | OXYGEN SATURATION: 99 % | HEIGHT: 63 IN | DIASTOLIC BLOOD PRESSURE: 80 MMHG | BODY MASS INDEX: 36.37 KG/M2 | TEMPERATURE: 98 F | HEART RATE: 74 BPM | WEIGHT: 205.25 LBS

## 2020-10-06 DIAGNOSIS — N89.8 VAGINAL DISCHARGE: Primary | ICD-10-CM

## 2020-10-06 PROCEDURE — 99214 PR OFFICE/OUTPT VISIT, EST, LEVL IV, 30-39 MIN: ICD-10-PCS | Mod: S$GLB,,, | Performed by: PHYSICIAN ASSISTANT

## 2020-10-06 PROCEDURE — 99999 PR PBB SHADOW E&M-EST. PATIENT-LVL III: ICD-10-PCS | Mod: PBBFAC,,, | Performed by: PHYSICIAN ASSISTANT

## 2020-10-06 PROCEDURE — 99214 OFFICE O/P EST MOD 30 MIN: CPT | Mod: S$GLB,,, | Performed by: PHYSICIAN ASSISTANT

## 2020-10-06 PROCEDURE — 3077F SYST BP >= 140 MM HG: CPT | Mod: CPTII,S$GLB,, | Performed by: PHYSICIAN ASSISTANT

## 2020-10-06 PROCEDURE — 3008F BODY MASS INDEX DOCD: CPT | Mod: CPTII,S$GLB,, | Performed by: PHYSICIAN ASSISTANT

## 2020-10-06 PROCEDURE — 87481 CANDIDA DNA AMP PROBE: CPT | Mod: 59

## 2020-10-06 PROCEDURE — 3079F DIAST BP 80-89 MM HG: CPT | Mod: CPTII,S$GLB,, | Performed by: PHYSICIAN ASSISTANT

## 2020-10-06 PROCEDURE — 87661 TRICHOMONAS VAGINALIS AMPLIF: CPT | Mod: 59

## 2020-10-06 PROCEDURE — 3079F PR MOST RECENT DIASTOLIC BLOOD PRESSURE 80-89 MM HG: ICD-10-PCS | Mod: CPTII,S$GLB,, | Performed by: PHYSICIAN ASSISTANT

## 2020-10-06 PROCEDURE — 3008F PR BODY MASS INDEX (BMI) DOCUMENTED: ICD-10-PCS | Mod: CPTII,S$GLB,, | Performed by: PHYSICIAN ASSISTANT

## 2020-10-06 PROCEDURE — 99999 PR PBB SHADOW E&M-EST. PATIENT-LVL III: CPT | Mod: PBBFAC,,, | Performed by: PHYSICIAN ASSISTANT

## 2020-10-06 PROCEDURE — 3077F PR MOST RECENT SYSTOLIC BLOOD PRESSURE >= 140 MM HG: ICD-10-PCS | Mod: CPTII,S$GLB,, | Performed by: PHYSICIAN ASSISTANT

## 2020-10-06 PROCEDURE — 87801 DETECT AGNT MULT DNA AMPLI: CPT

## 2020-10-06 PROCEDURE — 87491 CHLMYD TRACH DNA AMP PROBE: CPT | Mod: 59

## 2020-10-06 RX ORDER — METRONIDAZOLE 500 MG/1
500 TABLET ORAL EVERY 12 HOURS
Qty: 14 TABLET | Refills: 0 | Status: SHIPPED | OUTPATIENT
Start: 2020-10-06 | End: 2020-10-13

## 2020-10-06 NOTE — PROGRESS NOTES
Subjective:       Patient ID: Pat Yung is a 50 y.o. female.    Chief Complaint: Vaginal Discharge    Vaginal Discharge  The patient's primary symptoms include genital itching and vaginal discharge. The patient's pertinent negatives include no genital odor. This is a new problem. The current episode started 1 to 4 weeks ago. The problem occurs constantly. The problem has been unchanged. Pertinent negatives include no dysuria, fever or joint swelling. The vaginal discharge was yellow, green, watery and scant. She has tried antifungals (diflucan, nystatin) for the symptoms.     Social History     Socioeconomic History    Marital status:      Spouse name: Not on file    Number of children: Not on file    Years of education: Not on file    Highest education level: Not on file   Occupational History    Not on file   Social Needs    Financial resource strain: Somewhat hard    Food insecurity     Worry: Sometimes true     Inability: Never true    Transportation needs     Medical: No     Non-medical: No   Tobacco Use    Smoking status: Never Smoker    Smokeless tobacco: Never Used   Substance and Sexual Activity    Alcohol use: Yes     Frequency: Monthly or less     Drinks per session: 1 or 2     Binge frequency: Never     Comment: occasionally    Drug use: No    Sexual activity: Yes     Partners: Male     Birth control/protection: Surgical   Lifestyle    Physical activity     Days per week: 5 days     Minutes per session: 10 min    Stress: Not at all   Relationships    Social connections     Talks on phone: More than three times a week     Gets together: Twice a week     Attends [employer] service: Not on file     Active member of club or organization: Yes     Attends meetings of clubs or organizations: Never     Relationship status: Living with partner   Other Topics Concern    Not on file   Social History Narrative    Not on file       Review of Systems   Constitutional: Negative for  fatigue and fever.   Genitourinary: Positive for vaginal discharge. Negative for dysuria.         Objective:      Physical Exam  Constitutional:       Appearance: Normal appearance.   HENT:      Head: Normocephalic and atraumatic.   Genitourinary:     Labia:         Right: No rash.         Left: No rash.       Vagina: Vaginal discharge (thin yellow) and erythema present. No tenderness or lesions.   Skin:     General: Skin is warm and dry.   Neurological:      General: No focal deficit present.      Mental Status: She is alert and oriented to person, place, and time.   Psychiatric:         Mood and Affect: Mood normal.         Behavior: Behavior normal.         Assessment:       1. Vaginal discharge        Plan:         Pat was seen today for vaginal discharge.    Diagnoses and all orders for this visit:    Vaginal discharge  -     Vaginosis Screen by DNA Probe  -     C. trachomatis/N. gonorrhoeae by AMP DNA  -     metroNIDAZOLE (FLAGYL) 500 MG tablet; Take 1 tablet (500 mg total) by mouth every 12 (twelve) hours. for 7 days

## 2020-10-07 LAB
BACTERIAL VAGINOSIS DNA: POSITIVE
CANDIDA GLABRATA DNA: NEGATIVE
CANDIDA KRUSEI DNA: NEGATIVE
CANDIDA RRNA VAG QL PROBE: NEGATIVE
T VAGINALIS RRNA GENITAL QL PROBE: POSITIVE

## 2020-11-04 DIAGNOSIS — Z12.11 COLON CANCER SCREENING: ICD-10-CM

## 2020-12-10 DIAGNOSIS — A60.00 GENITAL HERPES: ICD-10-CM

## 2020-12-11 RX ORDER — VALACYCLOVIR HYDROCHLORIDE 500 MG/1
500 TABLET, FILM COATED ORAL DAILY
Qty: 30 TABLET | Refills: 11 | Status: SHIPPED | OUTPATIENT
Start: 2020-12-11 | End: 2022-05-11 | Stop reason: SDUPTHER

## 2021-01-02 ENCOUNTER — PATIENT MESSAGE (OUTPATIENT)
Dept: FAMILY MEDICINE | Facility: CLINIC | Age: 51
End: 2021-01-02

## 2021-01-06 ENCOUNTER — OFFICE VISIT (OUTPATIENT)
Dept: FAMILY MEDICINE | Facility: CLINIC | Age: 51
End: 2021-01-06
Payer: COMMERCIAL

## 2021-01-06 VITALS
HEIGHT: 63 IN | RESPIRATION RATE: 16 BRPM | OXYGEN SATURATION: 98 % | HEART RATE: 84 BPM | SYSTOLIC BLOOD PRESSURE: 124 MMHG | TEMPERATURE: 99 F | BODY MASS INDEX: 38.52 KG/M2 | DIASTOLIC BLOOD PRESSURE: 78 MMHG | WEIGHT: 217.38 LBS

## 2021-01-06 DIAGNOSIS — Z12.31 ENCOUNTER FOR SCREENING MAMMOGRAM FOR BREAST CANCER: ICD-10-CM

## 2021-01-06 DIAGNOSIS — N89.8 VAGINAL DISCHARGE: Primary | ICD-10-CM

## 2021-01-06 DIAGNOSIS — I10 ESSENTIAL HYPERTENSION: ICD-10-CM

## 2021-01-06 PROCEDURE — 99213 PR OFFICE/OUTPT VISIT, EST, LEVL III, 20-29 MIN: ICD-10-PCS | Mod: S$GLB,,, | Performed by: PHYSICIAN ASSISTANT

## 2021-01-06 PROCEDURE — 1126F PR PAIN SEVERITY QUANTIFIED, NO PAIN PRESENT: ICD-10-PCS | Mod: S$GLB,,, | Performed by: PHYSICIAN ASSISTANT

## 2021-01-06 PROCEDURE — 3074F SYST BP LT 130 MM HG: CPT | Mod: CPTII,S$GLB,, | Performed by: PHYSICIAN ASSISTANT

## 2021-01-06 PROCEDURE — 3078F DIAST BP <80 MM HG: CPT | Mod: CPTII,S$GLB,, | Performed by: PHYSICIAN ASSISTANT

## 2021-01-06 PROCEDURE — 99213 OFFICE O/P EST LOW 20 MIN: CPT | Mod: S$GLB,,, | Performed by: PHYSICIAN ASSISTANT

## 2021-01-06 PROCEDURE — 99999 PR PBB SHADOW E&M-EST. PATIENT-LVL IV: ICD-10-PCS | Mod: PBBFAC,,, | Performed by: PHYSICIAN ASSISTANT

## 2021-01-06 PROCEDURE — 3008F PR BODY MASS INDEX (BMI) DOCUMENTED: ICD-10-PCS | Mod: CPTII,S$GLB,, | Performed by: PHYSICIAN ASSISTANT

## 2021-01-06 PROCEDURE — 87481 CANDIDA DNA AMP PROBE: CPT | Mod: 59

## 2021-01-06 PROCEDURE — 3008F BODY MASS INDEX DOCD: CPT | Mod: CPTII,S$GLB,, | Performed by: PHYSICIAN ASSISTANT

## 2021-01-06 PROCEDURE — 3078F PR MOST RECENT DIASTOLIC BLOOD PRESSURE < 80 MM HG: ICD-10-PCS | Mod: CPTII,S$GLB,, | Performed by: PHYSICIAN ASSISTANT

## 2021-01-06 PROCEDURE — 99999 PR PBB SHADOW E&M-EST. PATIENT-LVL IV: CPT | Mod: PBBFAC,,, | Performed by: PHYSICIAN ASSISTANT

## 2021-01-06 PROCEDURE — 1126F AMNT PAIN NOTED NONE PRSNT: CPT | Mod: S$GLB,,, | Performed by: PHYSICIAN ASSISTANT

## 2021-01-06 PROCEDURE — 3074F PR MOST RECENT SYSTOLIC BLOOD PRESSURE < 130 MM HG: ICD-10-PCS | Mod: CPTII,S$GLB,, | Performed by: PHYSICIAN ASSISTANT

## 2021-01-06 RX ORDER — METRONIDAZOLE 500 MG/1
500 TABLET ORAL EVERY 12 HOURS
Qty: 14 TABLET | Refills: 0 | Status: SHIPPED | OUTPATIENT
Start: 2021-01-06 | End: 2021-01-13

## 2021-01-08 ENCOUNTER — HOSPITAL ENCOUNTER (OUTPATIENT)
Dept: RADIOLOGY | Facility: HOSPITAL | Age: 51
Discharge: HOME | End: 2021-01-08
Attending: PHYSICIAN ASSISTANT
Payer: COMMERCIAL

## 2021-01-08 DIAGNOSIS — Z12.31 ENCOUNTER FOR SCREENING MAMMOGRAM FOR BREAST CANCER: ICD-10-CM

## 2021-01-08 PROCEDURE — 77067 MAMMO DIGITAL SCREENING BILAT WITH TOMO: ICD-10-PCS | Mod: 26,,, | Performed by: RADIOLOGY

## 2021-01-08 PROCEDURE — 77063 BREAST TOMOSYNTHESIS BI: CPT | Mod: 26,,, | Performed by: RADIOLOGY

## 2021-01-08 PROCEDURE — 77067 SCR MAMMO BI INCL CAD: CPT | Mod: 26,,, | Performed by: RADIOLOGY

## 2021-01-08 PROCEDURE — 77063 MAMMO DIGITAL SCREENING BILAT WITH TOMO: ICD-10-PCS | Mod: 26,,, | Performed by: RADIOLOGY

## 2021-01-08 PROCEDURE — 77067 SCR MAMMO BI INCL CAD: CPT | Mod: TC

## 2021-01-15 ENCOUNTER — TELEPHONE (OUTPATIENT)
Dept: FAMILY MEDICINE | Facility: CLINIC | Age: 51
End: 2021-01-15

## 2021-01-15 ENCOUNTER — PATIENT MESSAGE (OUTPATIENT)
Dept: ENDOSCOPY | Facility: HOSPITAL | Age: 51
End: 2021-01-15

## 2021-01-15 DIAGNOSIS — Z01.818 PRE-OP TESTING: ICD-10-CM

## 2021-01-15 DIAGNOSIS — Z12.11 SPECIAL SCREENING FOR MALIGNANT NEOPLASMS, COLON: Primary | ICD-10-CM

## 2021-01-15 RX ORDER — SODIUM, POTASSIUM,MAG SULFATES 17.5-3.13G
1 SOLUTION, RECONSTITUTED, ORAL ORAL DAILY
Qty: 1 KIT | Refills: 0 | Status: SHIPPED | OUTPATIENT
Start: 2021-01-15 | End: 2021-01-17

## 2021-01-27 ENCOUNTER — OFFICE VISIT (OUTPATIENT)
Dept: FAMILY MEDICINE | Facility: CLINIC | Age: 51
End: 2021-01-27
Payer: COMMERCIAL

## 2021-01-27 VITALS
OXYGEN SATURATION: 97 % | BODY MASS INDEX: 38.24 KG/M2 | DIASTOLIC BLOOD PRESSURE: 79 MMHG | TEMPERATURE: 98 F | HEIGHT: 63 IN | WEIGHT: 215.81 LBS | SYSTOLIC BLOOD PRESSURE: 129 MMHG | HEART RATE: 84 BPM | RESPIRATION RATE: 17 BRPM

## 2021-01-27 DIAGNOSIS — A59.9 TRICHOMONIASIS: Primary | ICD-10-CM

## 2021-01-27 PROCEDURE — 87481 CANDIDA DNA AMP PROBE: CPT | Mod: 59

## 2021-01-27 PROCEDURE — 1126F AMNT PAIN NOTED NONE PRSNT: CPT | Mod: S$GLB,,, | Performed by: PHYSICIAN ASSISTANT

## 2021-01-27 PROCEDURE — 3008F PR BODY MASS INDEX (BMI) DOCUMENTED: ICD-10-PCS | Mod: CPTII,S$GLB,, | Performed by: PHYSICIAN ASSISTANT

## 2021-01-27 PROCEDURE — 1126F PR PAIN SEVERITY QUANTIFIED, NO PAIN PRESENT: ICD-10-PCS | Mod: S$GLB,,, | Performed by: PHYSICIAN ASSISTANT

## 2021-01-27 PROCEDURE — 99999 PR PBB SHADOW E&M-EST. PATIENT-LVL III: CPT | Mod: PBBFAC,,, | Performed by: PHYSICIAN ASSISTANT

## 2021-01-27 PROCEDURE — 99213 OFFICE O/P EST LOW 20 MIN: CPT | Mod: S$GLB,,, | Performed by: PHYSICIAN ASSISTANT

## 2021-01-27 PROCEDURE — 99999 PR PBB SHADOW E&M-EST. PATIENT-LVL III: ICD-10-PCS | Mod: PBBFAC,,, | Performed by: PHYSICIAN ASSISTANT

## 2021-01-27 PROCEDURE — 3008F BODY MASS INDEX DOCD: CPT | Mod: CPTII,S$GLB,, | Performed by: PHYSICIAN ASSISTANT

## 2021-01-27 PROCEDURE — 3078F DIAST BP <80 MM HG: CPT | Mod: CPTII,S$GLB,, | Performed by: PHYSICIAN ASSISTANT

## 2021-01-27 PROCEDURE — 3074F PR MOST RECENT SYSTOLIC BLOOD PRESSURE < 130 MM HG: ICD-10-PCS | Mod: CPTII,S$GLB,, | Performed by: PHYSICIAN ASSISTANT

## 2021-01-27 PROCEDURE — 3074F SYST BP LT 130 MM HG: CPT | Mod: CPTII,S$GLB,, | Performed by: PHYSICIAN ASSISTANT

## 2021-01-27 PROCEDURE — 3078F PR MOST RECENT DIASTOLIC BLOOD PRESSURE < 80 MM HG: ICD-10-PCS | Mod: CPTII,S$GLB,, | Performed by: PHYSICIAN ASSISTANT

## 2021-01-27 PROCEDURE — 99213 PR OFFICE/OUTPT VISIT, EST, LEVL III, 20-29 MIN: ICD-10-PCS | Mod: S$GLB,,, | Performed by: PHYSICIAN ASSISTANT

## 2021-02-04 ENCOUNTER — TELEPHONE (OUTPATIENT)
Dept: FAMILY MEDICINE | Facility: CLINIC | Age: 51
End: 2021-02-04

## 2021-02-04 DIAGNOSIS — A59.9 TRICHOMONAS INFECTION: Primary | ICD-10-CM

## 2021-02-04 RX ORDER — METRONIDAZOLE 500 MG/1
500 TABLET ORAL 3 TIMES DAILY
Qty: 21 TABLET | Refills: 0 | Status: SHIPPED | OUTPATIENT
Start: 2021-02-04 | End: 2021-02-11

## 2021-02-11 ENCOUNTER — ANESTHESIA EVENT (OUTPATIENT)
Dept: ENDOSCOPY | Facility: HOSPITAL | Age: 51
End: 2021-02-11
Payer: COMMERCIAL

## 2021-02-12 ENCOUNTER — HOSPITAL ENCOUNTER (OUTPATIENT)
Facility: HOSPITAL | Age: 51
Discharge: HOME | End: 2021-02-12
Attending: INTERNAL MEDICINE | Admitting: INTERNAL MEDICINE
Payer: COMMERCIAL

## 2021-02-12 ENCOUNTER — ANESTHESIA (OUTPATIENT)
Dept: ENDOSCOPY | Facility: HOSPITAL | Age: 51
End: 2021-02-12
Payer: COMMERCIAL

## 2021-02-12 VITALS
RESPIRATION RATE: 20 BRPM | SYSTOLIC BLOOD PRESSURE: 155 MMHG | HEART RATE: 73 BPM | TEMPERATURE: 98 F | OXYGEN SATURATION: 99 % | DIASTOLIC BLOOD PRESSURE: 80 MMHG

## 2021-02-12 PROCEDURE — G0121 COLON CA SCRN NOT HI RSK IND: HCPCS | Performed by: INTERNAL MEDICINE

## 2021-02-12 PROCEDURE — 37000009 HC ANESTHESIA EA ADD 15 MINS: Performed by: INTERNAL MEDICINE

## 2021-02-12 PROCEDURE — D9220A PRA ANESTHESIA: ICD-10-PCS | Mod: ,,, | Performed by: NURSE ANESTHETIST, CERTIFIED REGISTERED

## 2021-02-12 PROCEDURE — D9220A PRA ANESTHESIA: Mod: ,,, | Performed by: NURSE ANESTHETIST, CERTIFIED REGISTERED

## 2021-02-12 PROCEDURE — G0121 COLON CA SCRN NOT HI RSK IND: HCPCS | Mod: ,,, | Performed by: INTERNAL MEDICINE

## 2021-02-12 PROCEDURE — 25000003 PHARM REV CODE 250: Performed by: ANESTHESIOLOGY

## 2021-02-12 PROCEDURE — 25000003 PHARM REV CODE 250: Performed by: NURSE ANESTHETIST, CERTIFIED REGISTERED

## 2021-02-12 PROCEDURE — G0121 COLON CA SCRN NOT HI RSK IND: ICD-10-PCS | Mod: ,,, | Performed by: INTERNAL MEDICINE

## 2021-02-12 PROCEDURE — 37000008 HC ANESTHESIA 1ST 15 MINUTES: Performed by: INTERNAL MEDICINE

## 2021-02-12 PROCEDURE — 63600175 PHARM REV CODE 636 W HCPCS: Performed by: NURSE ANESTHETIST, CERTIFIED REGISTERED

## 2021-02-12 PROCEDURE — D9220A PRA ANESTHESIA: Mod: ,,, | Performed by: ANESTHESIOLOGY

## 2021-02-12 PROCEDURE — D9220A PRA ANESTHESIA: ICD-10-PCS | Mod: ,,, | Performed by: ANESTHESIOLOGY

## 2021-02-12 RX ORDER — LIDOCAINE HYDROCHLORIDE 10 MG/ML
1 INJECTION, SOLUTION EPIDURAL; INFILTRATION; INTRACAUDAL; PERINEURAL ONCE
Status: DISCONTINUED | OUTPATIENT
Start: 2021-02-12 | End: 2021-02-12 | Stop reason: HOSPADM

## 2021-02-12 RX ORDER — PROPOFOL 10 MG/ML
VIAL (ML) INTRAVENOUS
Status: DISCONTINUED | OUTPATIENT
Start: 2021-02-12 | End: 2021-02-12

## 2021-02-12 RX ORDER — SODIUM CHLORIDE 9 MG/ML
INJECTION, SOLUTION INTRAVENOUS CONTINUOUS
Status: DISCONTINUED | OUTPATIENT
Start: 2021-02-12 | End: 2021-02-12 | Stop reason: HOSPADM

## 2021-02-12 RX ORDER — LIDOCAINE HYDROCHLORIDE 20 MG/ML
INJECTION, SOLUTION EPIDURAL; INFILTRATION; INTRACAUDAL; PERINEURAL
Status: DISCONTINUED
Start: 2021-02-12 | End: 2021-02-12 | Stop reason: HOSPADM

## 2021-02-12 RX ORDER — LIDOCAINE HYDROCHLORIDE 20 MG/ML
INJECTION INTRAVENOUS
Status: DISCONTINUED | OUTPATIENT
Start: 2021-02-12 | End: 2021-02-12

## 2021-02-12 RX ORDER — PROPOFOL 10 MG/ML
INJECTION, EMULSION INTRAVENOUS
Status: DISCONTINUED
Start: 2021-02-12 | End: 2021-02-12 | Stop reason: HOSPADM

## 2021-02-12 RX ADMIN — PROPOFOL 40 MG: 10 INJECTION, EMULSION INTRAVENOUS at 10:02

## 2021-02-12 RX ADMIN — Medication 100 MG: at 10:02

## 2021-02-12 RX ADMIN — SODIUM CHLORIDE: 0.9 INJECTION, SOLUTION INTRAVENOUS at 09:02

## 2021-02-12 RX ADMIN — PROPOFOL 80 MG: 10 INJECTION, EMULSION INTRAVENOUS at 10:02

## 2021-02-22 ENCOUNTER — PATIENT MESSAGE (OUTPATIENT)
Dept: FAMILY MEDICINE | Facility: CLINIC | Age: 51
End: 2021-02-22

## 2021-03-02 ENCOUNTER — PATIENT MESSAGE (OUTPATIENT)
Dept: FAMILY MEDICINE | Facility: CLINIC | Age: 51
End: 2021-03-02

## 2021-03-02 DIAGNOSIS — A59.9 TRICHOMONAS INFECTION: Primary | ICD-10-CM

## 2021-03-05 ENCOUNTER — TELEPHONE (OUTPATIENT)
Dept: FAMILY MEDICINE | Facility: CLINIC | Age: 51
End: 2021-03-05

## 2021-04-28 ENCOUNTER — PATIENT MESSAGE (OUTPATIENT)
Dept: RESEARCH | Facility: HOSPITAL | Age: 51
End: 2021-04-28

## 2022-04-24 DIAGNOSIS — A60.00 GENITAL HERPES: ICD-10-CM

## 2022-04-25 RX ORDER — VALACYCLOVIR HYDROCHLORIDE 500 MG/1
500 TABLET, FILM COATED ORAL DAILY
Qty: 30 TABLET | Refills: 11 | OUTPATIENT
Start: 2022-04-25

## 2022-04-28 ENCOUNTER — TELEPHONE (OUTPATIENT)
Dept: FAMILY MEDICINE | Facility: CLINIC | Age: 52
End: 2022-04-28
Payer: COMMERCIAL

## 2022-05-11 ENCOUNTER — OFFICE VISIT (OUTPATIENT)
Dept: FAMILY MEDICINE | Facility: CLINIC | Age: 52
End: 2022-05-11
Payer: COMMERCIAL

## 2022-05-11 ENCOUNTER — LAB VISIT (OUTPATIENT)
Dept: LAB | Facility: HOSPITAL | Age: 52
End: 2022-05-11
Attending: PHYSICIAN ASSISTANT
Payer: COMMERCIAL

## 2022-05-11 VITALS
HEIGHT: 63 IN | SYSTOLIC BLOOD PRESSURE: 138 MMHG | WEIGHT: 206.38 LBS | RESPIRATION RATE: 17 BRPM | DIASTOLIC BLOOD PRESSURE: 84 MMHG | HEART RATE: 68 BPM | TEMPERATURE: 98 F | OXYGEN SATURATION: 97 % | BODY MASS INDEX: 36.57 KG/M2

## 2022-05-11 DIAGNOSIS — Z00.00 ANNUAL PHYSICAL EXAM: ICD-10-CM

## 2022-05-11 DIAGNOSIS — A60.00 GENITAL HERPES: Primary | ICD-10-CM

## 2022-05-11 DIAGNOSIS — Z12.31 ENCOUNTER FOR SCREENING MAMMOGRAM FOR MALIGNANT NEOPLASM OF BREAST: ICD-10-CM

## 2022-05-11 LAB
25(OH)D3+25(OH)D2 SERPL-MCNC: 24 NG/ML (ref 30–96)
ALBUMIN SERPL BCP-MCNC: 3.7 G/DL (ref 3.5–5.2)
ALP SERPL-CCNC: 82 U/L (ref 55–135)
ALT SERPL W/O P-5'-P-CCNC: 15 U/L (ref 10–44)
ANION GAP SERPL CALC-SCNC: 8 MMOL/L (ref 8–16)
AST SERPL-CCNC: 18 U/L (ref 10–40)
BASOPHILS # BLD AUTO: 0.08 K/UL (ref 0–0.2)
BASOPHILS NFR BLD: 1 % (ref 0–1.9)
BILIRUB SERPL-MCNC: 0.2 MG/DL (ref 0.1–1)
BUN SERPL-MCNC: 15 MG/DL (ref 6–20)
CALCIUM SERPL-MCNC: 9.9 MG/DL (ref 8.7–10.5)
CHLORIDE SERPL-SCNC: 102 MMOL/L (ref 95–110)
CHOLEST SERPL-MCNC: 237 MG/DL (ref 120–199)
CHOLEST/HDLC SERPL: 3.4 {RATIO} (ref 2–5)
CO2 SERPL-SCNC: 28 MMOL/L (ref 23–29)
CREAT SERPL-MCNC: 0.7 MG/DL (ref 0.5–1.4)
DIFFERENTIAL METHOD: ABNORMAL
EOSINOPHIL # BLD AUTO: 0.2 K/UL (ref 0–0.5)
EOSINOPHIL NFR BLD: 2.6 % (ref 0–8)
ERYTHROCYTE [DISTWIDTH] IN BLOOD BY AUTOMATED COUNT: 13.4 % (ref 11.5–14.5)
EST. GFR  (AFRICAN AMERICAN): >60 ML/MIN/1.73 M^2
EST. GFR  (NON AFRICAN AMERICAN): >60 ML/MIN/1.73 M^2
GLUCOSE SERPL-MCNC: 91 MG/DL (ref 70–110)
HCT VFR BLD AUTO: 40.5 % (ref 37–48.5)
HDLC SERPL-MCNC: 70 MG/DL (ref 40–75)
HDLC SERPL: 29.5 % (ref 20–50)
HGB BLD-MCNC: 12.6 G/DL (ref 12–16)
IMM GRANULOCYTES # BLD AUTO: 0.01 K/UL (ref 0–0.04)
IMM GRANULOCYTES NFR BLD AUTO: 0.1 % (ref 0–0.5)
LDLC SERPL CALC-MCNC: 144 MG/DL (ref 63–159)
LYMPHOCYTES # BLD AUTO: 3.2 K/UL (ref 1–4.8)
LYMPHOCYTES NFR BLD: 39.7 % (ref 18–48)
MCH RBC QN AUTO: 29.3 PG (ref 27–31)
MCHC RBC AUTO-ENTMCNC: 31.1 G/DL (ref 32–36)
MCV RBC AUTO: 94 FL (ref 82–98)
MONOCYTES # BLD AUTO: 0.7 K/UL (ref 0.3–1)
MONOCYTES NFR BLD: 8.5 % (ref 4–15)
NEUTROPHILS # BLD AUTO: 3.9 K/UL (ref 1.8–7.7)
NEUTROPHILS NFR BLD: 48.1 % (ref 38–73)
NONHDLC SERPL-MCNC: 167 MG/DL
NRBC BLD-RTO: 0 /100 WBC
PLATELET # BLD AUTO: 313 K/UL (ref 150–450)
PMV BLD AUTO: 10 FL (ref 9.2–12.9)
POTASSIUM SERPL-SCNC: 4.7 MMOL/L (ref 3.5–5.1)
PROT SERPL-MCNC: 8 G/DL (ref 6–8.4)
RBC # BLD AUTO: 4.3 M/UL (ref 4–5.4)
SODIUM SERPL-SCNC: 138 MMOL/L (ref 136–145)
TRIGL SERPL-MCNC: 115 MG/DL (ref 30–150)
WBC # BLD AUTO: 8.02 K/UL (ref 3.9–12.7)

## 2022-05-11 PROCEDURE — 99999 PR PBB SHADOW E&M-EST. PATIENT-LVL IV: ICD-10-PCS | Mod: PBBFAC,,, | Performed by: PHYSICIAN ASSISTANT

## 2022-05-11 PROCEDURE — 3008F PR BODY MASS INDEX (BMI) DOCUMENTED: ICD-10-PCS | Mod: CPTII,S$GLB,, | Performed by: PHYSICIAN ASSISTANT

## 2022-05-11 PROCEDURE — 82306 VITAMIN D 25 HYDROXY: CPT | Performed by: PHYSICIAN ASSISTANT

## 2022-05-11 PROCEDURE — 99213 OFFICE O/P EST LOW 20 MIN: CPT | Mod: S$GLB,,, | Performed by: PHYSICIAN ASSISTANT

## 2022-05-11 PROCEDURE — 36415 COLL VENOUS BLD VENIPUNCTURE: CPT | Mod: PO | Performed by: PHYSICIAN ASSISTANT

## 2022-05-11 PROCEDURE — 3008F BODY MASS INDEX DOCD: CPT | Mod: CPTII,S$GLB,, | Performed by: PHYSICIAN ASSISTANT

## 2022-05-11 PROCEDURE — 83036 HEMOGLOBIN GLYCOSYLATED A1C: CPT | Performed by: PHYSICIAN ASSISTANT

## 2022-05-11 PROCEDURE — 1159F PR MEDICATION LIST DOCUMENTED IN MEDICAL RECORD: ICD-10-PCS | Mod: CPTII,S$GLB,, | Performed by: PHYSICIAN ASSISTANT

## 2022-05-11 PROCEDURE — 3075F SYST BP GE 130 - 139MM HG: CPT | Mod: CPTII,S$GLB,, | Performed by: PHYSICIAN ASSISTANT

## 2022-05-11 PROCEDURE — 85025 COMPLETE CBC W/AUTO DIFF WBC: CPT | Performed by: PHYSICIAN ASSISTANT

## 2022-05-11 PROCEDURE — 99213 PR OFFICE/OUTPT VISIT, EST, LEVL III, 20-29 MIN: ICD-10-PCS | Mod: S$GLB,,, | Performed by: PHYSICIAN ASSISTANT

## 2022-05-11 PROCEDURE — 1160F RVW MEDS BY RX/DR IN RCRD: CPT | Mod: CPTII,S$GLB,, | Performed by: PHYSICIAN ASSISTANT

## 2022-05-11 PROCEDURE — 3079F PR MOST RECENT DIASTOLIC BLOOD PRESSURE 80-89 MM HG: ICD-10-PCS | Mod: CPTII,S$GLB,, | Performed by: PHYSICIAN ASSISTANT

## 2022-05-11 PROCEDURE — 80053 COMPREHEN METABOLIC PANEL: CPT | Performed by: PHYSICIAN ASSISTANT

## 2022-05-11 PROCEDURE — 3079F DIAST BP 80-89 MM HG: CPT | Mod: CPTII,S$GLB,, | Performed by: PHYSICIAN ASSISTANT

## 2022-05-11 PROCEDURE — 99999 PR PBB SHADOW E&M-EST. PATIENT-LVL IV: CPT | Mod: PBBFAC,,, | Performed by: PHYSICIAN ASSISTANT

## 2022-05-11 PROCEDURE — 80061 LIPID PANEL: CPT | Performed by: PHYSICIAN ASSISTANT

## 2022-05-11 PROCEDURE — 1160F PR REVIEW ALL MEDS BY PRESCRIBER/CLIN PHARMACIST DOCUMENTED: ICD-10-PCS | Mod: CPTII,S$GLB,, | Performed by: PHYSICIAN ASSISTANT

## 2022-05-11 PROCEDURE — 3075F PR MOST RECENT SYSTOLIC BLOOD PRESS GE 130-139MM HG: ICD-10-PCS | Mod: CPTII,S$GLB,, | Performed by: PHYSICIAN ASSISTANT

## 2022-05-11 PROCEDURE — 1159F MED LIST DOCD IN RCRD: CPT | Mod: CPTII,S$GLB,, | Performed by: PHYSICIAN ASSISTANT

## 2022-05-11 RX ORDER — VALACYCLOVIR HYDROCHLORIDE 500 MG/1
500 TABLET, FILM COATED ORAL DAILY
Qty: 30 TABLET | Refills: 11 | Status: SHIPPED | OUTPATIENT
Start: 2022-05-11 | End: 2023-06-30 | Stop reason: SDUPTHER

## 2022-05-11 NOTE — PROGRESS NOTES
Subjective:       Patient ID: Pat Yung is a 51 y.o. female.    Chief Complaint: Medication Refill    HPI: 52 yo female presents for med refill. Doing well on valtrex.     Review of Systems      Objective:      Physical Exam  Constitutional:       Appearance: Normal appearance.   Neurological:      Mental Status: She is alert.   Psychiatric:         Mood and Affect: Mood normal.         Behavior: Behavior normal.           Assessment:       Problem List Items Addressed This Visit    None     Visit Diagnoses     Genital herpes    -  Primary    Relevant Medications    valACYclovir (VALTREX) 500 MG tablet    Encounter for screening mammogram for malignant neoplasm of breast        Relevant Orders    Mammo Digital Screening Bilat    Annual physical exam        Relevant Orders    CBC Auto Differential    Comprehensive Metabolic Panel    Lipid Panel    Vitamin D    Hemoglobin A1C          Plan:         Pat was seen today for medication refill.    Diagnoses and all orders for this visit:    Genital herpes  -     valACYclovir (VALTREX) 500 MG tablet; Take 1 tablet (500 mg total) by mouth once daily.    Encounter for screening mammogram for malignant neoplasm of breast  -     Mammo Digital Screening Bilat; Future    Annual physical exam  -     CBC Auto Differential; Future  -     Comprehensive Metabolic Panel; Future  -     Lipid Panel; Future  -     Vitamin D; Future  -     Hemoglobin A1C; Future

## 2022-05-11 NOTE — PROGRESS NOTES
Health Maintenance Due   Topic Date Due    TETANUS VACCINE  Never done    Shingles Vaccine (1 of 2) Never done    Mammogram  01/08/2022    COVID-19 Vaccine (3 - Booster for Pfizer series) 01/25/2022

## 2022-05-12 LAB
ESTIMATED AVG GLUCOSE: 108 MG/DL (ref 68–131)
HBA1C MFR BLD: 5.4 % (ref 4–5.6)

## 2022-06-17 ENCOUNTER — TELEPHONE (OUTPATIENT)
Dept: FAMILY MEDICINE | Facility: CLINIC | Age: 52
End: 2022-06-17
Payer: COMMERCIAL

## 2022-06-17 NOTE — TELEPHONE ENCOUNTER
----- Message from Narcisosophiechirscarol Sylvain sent at 6/17/2022  3:12 PM CDT -----  Regarding: request for medical advice  Type: Patient Call Back    Who called:Pat     What is the request in detail: the patient is requesting a call back from the staff. She was seen today by another provider and had a catapult test. She stated that her bp is high. Her bottom number is 85 and she is experiencing a headache. She would like a call back with medical advice. Please return call at earliest convenience.    Can the clinic reply by [facility]?no     Would the patient rather a call back or a response via [facility]? Call back     Best call back number:[phone]

## 2022-06-17 NOTE — TELEPHONE ENCOUNTER
Pt went to [facility] provider with her job, cholesterol was elevated and diastolic number was 85; she is concerned and would like to discuss starting medication; no appointments available with you next week; she will send the results through the portal for you to review

## 2022-06-18 ENCOUNTER — PATIENT MESSAGE (OUTPATIENT)
Dept: FAMILY MEDICINE | Facility: CLINIC | Age: 52
End: 2022-06-18
Payer: COMMERCIAL

## 2022-06-20 ENCOUNTER — PATIENT MESSAGE (OUTPATIENT)
Dept: FAMILY MEDICINE | Facility: CLINIC | Age: 52
End: 2022-06-20
Payer: COMMERCIAL

## 2022-06-20 NOTE — TELEPHONE ENCOUNTER
bp was stable at visit last month. I would like her to monitor her BP at home, may need to purchase a cuff. And send me readings in 2 weeks

## 2022-10-04 ENCOUNTER — TELEPHONE (OUTPATIENT)
Dept: CARDIOLOGY | Facility: CLINIC | Age: 52
End: 2022-10-04
Payer: COMMERCIAL

## 2022-10-04 NOTE — TELEPHONE ENCOUNTER
Patient given an appt for 11/3/2022 with Dr Delacruz she will see if she needs a referral and let me know.        ----- Message from Herbie Key sent at 10/4/2022  2:26 PM CDT -----  Regarding: return call  Patient returning call to Melina.  Requesting a call back.    Call: [phone] (Mobile

## 2022-11-03 ENCOUNTER — OFFICE VISIT (OUTPATIENT)
Dept: GASTROENTEROLOGY | Facility: CLINIC | Age: 52
End: 2022-11-03
Payer: COMMERCIAL

## 2022-11-03 VITALS — BODY MASS INDEX: 37.54 KG/M2 | HEIGHT: 63 IN | WEIGHT: 211.88 LBS

## 2022-11-03 DIAGNOSIS — R10.9 ABDOMINAL PAIN, UNSPECIFIED ABDOMINAL LOCATION: Primary | ICD-10-CM

## 2022-11-03 PROCEDURE — 3044F HG A1C LEVEL LT 7.0%: CPT | Mod: CPTII,S$GLB,, | Performed by: INTERNAL MEDICINE

## 2022-11-03 PROCEDURE — 99214 OFFICE O/P EST MOD 30 MIN: CPT | Mod: S$GLB,,, | Performed by: INTERNAL MEDICINE

## 2022-11-03 PROCEDURE — 3008F BODY MASS INDEX DOCD: CPT | Mod: CPTII,S$GLB,, | Performed by: INTERNAL MEDICINE

## 2022-11-03 PROCEDURE — 1159F MED LIST DOCD IN RCRD: CPT | Mod: CPTII,S$GLB,, | Performed by: INTERNAL MEDICINE

## 2022-11-03 PROCEDURE — 3044F PR MOST RECENT HEMOGLOBIN A1C LEVEL <7.0%: ICD-10-PCS | Mod: CPTII,S$GLB,, | Performed by: INTERNAL MEDICINE

## 2022-11-03 PROCEDURE — 99999 PR PBB SHADOW E&M-EST. PATIENT-LVL III: ICD-10-PCS | Mod: PBBFAC,,, | Performed by: INTERNAL MEDICINE

## 2022-11-03 PROCEDURE — 99214 PR OFFICE/OUTPT VISIT, EST, LEVL IV, 30-39 MIN: ICD-10-PCS | Mod: S$GLB,,, | Performed by: INTERNAL MEDICINE

## 2022-11-03 PROCEDURE — 3008F PR BODY MASS INDEX (BMI) DOCUMENTED: ICD-10-PCS | Mod: CPTII,S$GLB,, | Performed by: INTERNAL MEDICINE

## 2022-11-03 PROCEDURE — 1159F PR MEDICATION LIST DOCUMENTED IN MEDICAL RECORD: ICD-10-PCS | Mod: CPTII,S$GLB,, | Performed by: INTERNAL MEDICINE

## 2022-11-03 PROCEDURE — 99999 PR PBB SHADOW E&M-EST. PATIENT-LVL III: CPT | Mod: PBBFAC,,, | Performed by: INTERNAL MEDICINE

## 2022-11-03 NOTE — PROGRESS NOTES
Subjective:       Patient ID: Pat Yung is a 52 y.o. female.    Chief Complaint: Abdominal Pain and Constipation    Patient here today to formally establish care with aforementioned complaints.  She was previously seen by Dr. Canas at time of screening colonoscopy last year which was unremarkable.  Today patient reports intermittent abdominal pain which occurs once or twice a month, typically lasting 3-4 days per episode.  It is very debilitating and although it does not seem to be precipitated by eating avoids eating because of it.  She does take omeprazole as needed which can help.  Pain does not seem to be modified by having a bowel movement.  She denies significant NSAID use.  She was told she had ulcer many years ago after undergoing EGD at [facility]  Review of Systems   Gastrointestinal:  Positive for abdominal distention and abdominal pain.         The following portions of the patient's history were reviewed and updated as appropriate: allergies, current medications, past family history, past medical history, past social history, past surgical history and problem list.    Objective:      Physical Exam  Constitutional:       Appearance: She is well-developed.   HENT:      Head: Normocephalic and atraumatic.   Eyes:      Conjunctiva/sclera: Conjunctivae normal.   Pulmonary:      Effort: Pulmonary effort is normal. No respiratory distress.   Musculoskeletal:      Cervical back: Normal range of motion.   Neurological:      Mental Status: She is alert and oriented to person, place, and time.   Psychiatric:         Behavior: Behavior normal.         Thought Content: Thought content normal.         Judgment: Judgment normal.         Pertinent labs and imaging studies reviewed    Assessment:       1. Abdominal pain, unspecified abdominal location        Plan:       Repeat EGD   Schedule ultrasound to exclude biliary pathology  Continue PPI as needed.  Could consider daily dosing    (Portions of this  note were dictated using voice recognition software and may contain dictation related errors in spelling/grammar/syntax not found on text review)

## 2022-12-05 ENCOUNTER — TELEPHONE (OUTPATIENT)
Dept: GASTROENTEROLOGY | Facility: CLINIC | Age: 52
End: 2022-12-05
Payer: COMMERCIAL

## 2022-12-05 NOTE — TELEPHONE ENCOUNTER
----- Message from Bhupendra Delacruz MD sent at 11/21/2022  8:37 AM CST -----  Unremarkable US. Proceed with EGD

## 2022-12-06 ENCOUNTER — TELEPHONE (OUTPATIENT)
Dept: GASTROENTEROLOGY | Facility: CLINIC | Age: 52
End: 2022-12-06
Payer: COMMERCIAL

## 2022-12-27 ENCOUNTER — TELEPHONE (OUTPATIENT)
Dept: SURGERY | Facility: CLINIC | Age: 52
End: 2022-12-27
Payer: COMMERCIAL

## 2022-12-27 NOTE — TELEPHONE ENCOUNTER
This patient called re: a time to report for the EGD scheduled for [address]/2022. The patient was advised a jennifer will come from the Pre Op dept. today with a time to report for the scheduled procedure.

## 2023-01-13 ENCOUNTER — PATIENT MESSAGE (OUTPATIENT)
Dept: FAMILY MEDICINE | Facility: CLINIC | Age: 53
End: 2023-01-13
Payer: COMMERCIAL

## 2023-01-13 ENCOUNTER — PATIENT MESSAGE (OUTPATIENT)
Dept: GASTROENTEROLOGY | Facility: CLINIC | Age: 53
End: 2023-01-13
Payer: COMMERCIAL

## 2023-01-23 ENCOUNTER — TELEPHONE (OUTPATIENT)
Dept: GASTROENTEROLOGY | Facility: CLINIC | Age: 53
End: 2023-01-23
Payer: COMMERCIAL

## 2023-01-23 NOTE — TELEPHONE ENCOUNTER
----- Message from Bhupendra Delacruz MD sent at 1/13/2023  2:44 PM CST -----  Biopsies taken during recent EGD positive for H pylori.  Will prescribe a course of antibiotics as well as antacids, all of which to be taken twice a day for full to weeks.  Following completion of regimen a stool test will need to be performed to co  nfirm eradication.

## 2023-02-06 ENCOUNTER — PATIENT MESSAGE (OUTPATIENT)
Dept: GASTROENTEROLOGY | Facility: CLINIC | Age: 53
End: 2023-02-06
Payer: COMMERCIAL

## 2023-02-06 DIAGNOSIS — A04.8 H. PYLORI INFECTION: Primary | ICD-10-CM

## 2023-02-27 ENCOUNTER — TELEPHONE (OUTPATIENT)
Dept: GASTROENTEROLOGY | Facility: CLINIC | Age: 53
End: 2023-02-27
Payer: COMMERCIAL

## 2023-02-27 NOTE — TELEPHONE ENCOUNTER
----- Message from Alex Andrade sent at 2/27/2023  2:14 PM CST -----  Regarding: Speak to Staff  Patient called in requesting to speak with provider's staff/nurse. Reason undisclosed. Requested a call back to discuss further.            Contact: [phone]

## 2023-03-29 ENCOUNTER — OFFICE VISIT (OUTPATIENT)
Dept: SPORTS MEDICINE | Facility: CLINIC | Age: 53
End: 2023-03-29
Payer: COMMERCIAL

## 2023-03-29 ENCOUNTER — HOSPITAL ENCOUNTER (OUTPATIENT)
Dept: RADIOLOGY | Facility: HOSPITAL | Age: 53
Discharge: HOME | End: 2023-03-29
Attending: PHYSICIAN ASSISTANT
Payer: COMMERCIAL

## 2023-03-29 VITALS
WEIGHT: 210 LBS | BODY MASS INDEX: 37.21 KG/M2 | SYSTOLIC BLOOD PRESSURE: 144 MMHG | DIASTOLIC BLOOD PRESSURE: 75 MMHG | HEIGHT: 63 IN | HEART RATE: 80 BPM

## 2023-03-29 DIAGNOSIS — M17.11 OSTEOARTHRITIS OF RIGHT KNEE, UNSPECIFIED OSTEOARTHRITIS TYPE: ICD-10-CM

## 2023-03-29 DIAGNOSIS — M25.561 RIGHT KNEE PAIN, UNSPECIFIED CHRONICITY: ICD-10-CM

## 2023-03-29 DIAGNOSIS — M25.561 ACUTE PAIN OF RIGHT KNEE: Primary | ICD-10-CM

## 2023-03-29 PROCEDURE — 1160F RVW MEDS BY RX/DR IN RCRD: CPT | Mod: CPTII,S$GLB,, | Performed by: PHYSICIAN ASSISTANT

## 2023-03-29 PROCEDURE — 73564 X-RAY EXAM KNEE 4 OR MORE: CPT | Mod: 26,,, | Performed by: RADIOLOGY

## 2023-03-29 PROCEDURE — 3077F PR MOST RECENT SYSTOLIC BLOOD PRESSURE >= 140 MM HG: ICD-10-PCS | Mod: CPTII,S$GLB,, | Performed by: PHYSICIAN ASSISTANT

## 2023-03-29 PROCEDURE — 20610 PR DRAIN/INJECT LARGE JOINT/BURSA: ICD-10-PCS | Mod: RT,S$GLB,, | Performed by: PHYSICIAN ASSISTANT

## 2023-03-29 PROCEDURE — 1160F PR REVIEW ALL MEDS BY PRESCRIBER/CLIN PHARMACIST DOCUMENTED: ICD-10-PCS | Mod: CPTII,S$GLB,, | Performed by: PHYSICIAN ASSISTANT

## 2023-03-29 PROCEDURE — 73564 X-RAY EXAM KNEE 4 OR MORE: CPT | Mod: TC,50

## 2023-03-29 PROCEDURE — 99204 PR OFFICE/OUTPT VISIT, NEW, LEVL IV, 45-59 MIN: ICD-10-PCS | Mod: 25,S$GLB,, | Performed by: PHYSICIAN ASSISTANT

## 2023-03-29 PROCEDURE — 3078F PR MOST RECENT DIASTOLIC BLOOD PRESSURE < 80 MM HG: ICD-10-PCS | Mod: CPTII,S$GLB,, | Performed by: PHYSICIAN ASSISTANT

## 2023-03-29 PROCEDURE — 1159F MED LIST DOCD IN RCRD: CPT | Mod: CPTII,S$GLB,, | Performed by: PHYSICIAN ASSISTANT

## 2023-03-29 PROCEDURE — 73564 XR KNEE ORTHO BILAT WITH FLEXION: ICD-10-PCS | Mod: 26,,, | Performed by: RADIOLOGY

## 2023-03-29 PROCEDURE — 3077F SYST BP >= 140 MM HG: CPT | Mod: CPTII,S$GLB,, | Performed by: PHYSICIAN ASSISTANT

## 2023-03-29 PROCEDURE — 20610 DRAIN/INJ JOINT/BURSA W/O US: CPT | Mod: RT,S$GLB,, | Performed by: PHYSICIAN ASSISTANT

## 2023-03-29 PROCEDURE — 99999 PR PBB SHADOW E&M-EST. PATIENT-LVL IV: ICD-10-PCS | Mod: PBBFAC,,, | Performed by: PHYSICIAN ASSISTANT

## 2023-03-29 PROCEDURE — 3008F PR BODY MASS INDEX (BMI) DOCUMENTED: ICD-10-PCS | Mod: CPTII,S$GLB,, | Performed by: PHYSICIAN ASSISTANT

## 2023-03-29 PROCEDURE — 1159F PR MEDICATION LIST DOCUMENTED IN MEDICAL RECORD: ICD-10-PCS | Mod: CPTII,S$GLB,, | Performed by: PHYSICIAN ASSISTANT

## 2023-03-29 PROCEDURE — 3008F BODY MASS INDEX DOCD: CPT | Mod: CPTII,S$GLB,, | Performed by: PHYSICIAN ASSISTANT

## 2023-03-29 PROCEDURE — 99999 PR PBB SHADOW E&M-EST. PATIENT-LVL IV: CPT | Mod: PBBFAC,,, | Performed by: PHYSICIAN ASSISTANT

## 2023-03-29 PROCEDURE — 3078F DIAST BP <80 MM HG: CPT | Mod: CPTII,S$GLB,, | Performed by: PHYSICIAN ASSISTANT

## 2023-03-29 PROCEDURE — 99204 OFFICE O/P NEW MOD 45 MIN: CPT | Mod: 25,S$GLB,, | Performed by: PHYSICIAN ASSISTANT

## 2023-03-29 RX ORDER — TRIAMCINOLONE ACETONIDE 40 MG/ML
40 INJECTION, SUSPENSION INTRA-ARTICULAR; INTRAMUSCULAR
Status: COMPLETED | OUTPATIENT
Start: 2023-03-29 | End: 2023-03-29

## 2023-03-29 RX ADMIN — TRIAMCINOLONE ACETONIDE 40 MG: 40 INJECTION, SUSPENSION INTRA-ARTICULAR; INTRAMUSCULAR at 11:03

## 2023-03-30 NOTE — PROGRESS NOTES
CC: right knee pain    52 y.o. Female [employer] , who reports anterior lateral knee pain refractory to conservative mgmt. Pain radiates into her lateral right calf.     Pain has been present for 1 month and worsening with no inciting injury or trauma. She was previously seen in the ED for her knee pain.     She reports that the pain is worse with steps.  It also bothers her at night.    Is affecting ADLs.     No mechanical symptoms, no instability    Review of Systems   Constitution: Negative. Negative for chills, fever and night sweats.   HENT: Negative for congestion and headaches.    Eyes: Negative for blurred vision, left vision loss and right vision loss.   Cardiovascular: Negative for chest pain and syncope.   Respiratory: Negative for cough and shortness of breath.    Endocrine: Negative for polydipsia, polyphagia and polyuria.   Hematologic/Lymphatic: Negative for bleeding problem. Does not bruise/bleed easily.   Skin: Negative for dry skin, itching and rash.   Musculoskeletal: Negative for falls and muscle weakness.   Gastrointestinal: Negative for abdominal pain and bowel incontinence.   Genitourinary: Negative for bladder incontinence and nocturia.   Neurological: Negative for disturbances in coordination, loss of balance and seizures.   Psychiatric/Behavioral: Negative for depression. The patient does not have insomnia.    Allergic/Immunologic: Negative for hives and persistent infections.   All other systems negative      PAST MEDICAL HISTORY:   Past Medical History:   Diagnosis Date    GERD (gastroesophageal reflux disease)     Herpes simplex without mention of complication     Hyperlipidemia     Hypertension     Ulcer     Vitamin D deficiency      PAST SURGICAL HISTORY:   Past Surgical History:   Procedure Laterality Date     SECTION  1998    x 1    COLONOSCOPY N/A 2021    Procedure: COLONOSCOPY;  Surgeon: Jesu Canas MD;  Location: [facility];  Service: Endoscopy;   "Laterality: N/A;  covid 2/9 [place] -ml    ESOPHAGOGASTRODUODENOSCOPY  12/28/2022    ESOPHAGOGASTRODUODENOSCOPY N/A 12/28/2022    Procedure: EGD (ESOPHAGOGASTRODUODENOSCOPY);  Surgeon: Bhupendra Delacruz MD;  Location: [facility];  Service: Endoscopy;  Laterality: N/A;    TUBAL LIGATION  1998     FAMILY HISTORY:   Family History   Problem Relation Age of Onset    Diabetes Maternal Grandmother     Heart disease Maternal Grandmother     Kidney disease Maternal Grandmother     Stroke Maternal Grandmother     Vision loss Maternal Grandmother     COPD Maternal Grandfather     Heart disease Maternal Grandfather     Hyperlipidemia Maternal Grandfather     Hypertension Maternal Grandfather     Stroke Maternal Grandfather     Vision loss Paternal Grandmother     Sarcoidosis Mother     Vitiligo Mother     Ovarian cancer Neg Hx     Colon cancer Neg Hx     Breast cancer Neg Hx      SOCIAL HISTORY:   Social History     Socioeconomic History    Marital status:    Tobacco Use    Smoking status: Never    Smokeless tobacco: Never   Substance and Sexual Activity    Alcohol use: Yes     Comment: occasionally    Drug use: No    Sexual activity: Yes     Partners: Male     Birth control/protection: Surgical       MEDICATIONS:   Current Outpatient Medications:     amLODIPine (NORVASC) 5 MG tablet, TAKE 1 TABLET(5 MG) BY MOUTH EVERY DAY, Disp: 90 tablet, Rfl: 0    pantoprazole (PROTONIX) 40 MG tablet, Take 1 tablet (40 mg total) by mouth 2 (two) times daily. for 14 days, Disp: 28 tablet, Rfl: 0    valACYclovir (VALTREX) 500 MG tablet, Take 1 tablet (500 mg total) by mouth once daily. (Patient not taking: Reported on 11/3/2022), Disp: 30 tablet, Rfl: 11  No current facility-administered medications for this visit.  ALLERGIES:   Review of patient's allergies indicates:   Allergen Reactions    Nexium [esomeprazole magnesium] Hives     Patient says that began having "break-outs" on her abdomen       VITAL SIGNS: BP (!) 144/75   " "Pulse 80   Ht 5' 3" (1.6 m)   Wt 95.3 kg (210 lb)   LMP  (LMP Unknown)   BMI 37.20 kg/m²      PHYSICAL EXAMINATION    General:  The patient is alert and oriented x 3.  Mood is pleasant.  Observation of ears, eyes and nose reveal no gross abnormalities.  HEENT: NCAT, sclera nonicteric  Lungs: Respirations are equal and unlabored.    right  KNEE EXAMINATION     OBSERVATION / INSPECTION   Gait:   Nonantalgic   Alignment:  Neutral   Scars:   None   Muscle atrophy: Mild  Effusion:  mild  Warmth:  None   Discoloration:   none     TENDERNESS / CREPITUS (T / C):          T / C      T / C   Patella   - / -   Lateral joint line   + / -      Peripatellar medial  -  Medial joint line    - / -   Peripatellar lateral +  Medial plica   - / -   Patellar tendon -   Popliteal fossa  - / -   Quad tendon   -   Gastrocnemius   -   Prepatellar Bursa - / -   Quadricep   -   Tibial tubercle  -  Thigh/hamstring  -   Pes anserine/HS -  Fibula    -   ITB   - / -  Tibia     -   Tib/fib joint  - / -  LCL    -     MFC   - / -   MCL: Proximal  -    LFC   - / -    Distal   -          ROM: (* = pain)  PASSIVE   ACTIVE    Left :   0 / 0 / 120   0 / 0 / 120    Right :    0 / 0 / 95*   0 / 0 /85*    Patellofemoral examination:  See above noted areas of tenderness.   Patella position    Subluxation / dislocation: Centered           Sup. / Inf;   Normal   Crepitus (PF):    Absent   Patellar Mobility:       Medial-lateral:   Normal    Superior-inferior:  Normal    Inferior tilt   Normal    Patellar tendon:  Normal   Lateral tilt:    Normal   J-sign:     None   Patellofemoral grind:   +       MENISCAL SIGNS:     Pain on terminal extension:  +  Pain on terminal flexion:  +  Zarinas maneuver:  Unable to test.   Squat     NT    LIGAMENT EXAMINATION:  ACL / Lachman:  normal (-1 to 2mm)    PCL-Post.  drawer: normal 0 to 2mm  MCL- Valgus:  normal 0 to 2mm  LCL- Varus:  normal 0 to 2mm  Pivot shift: normal (Equal)       STRENGTH: (* = with " pain) PAINFUL SIDE   Quadricep   5/5   Hamstrin/5    EXTREMITY NEURO-VASCULAR EXAMINATION:   Sensation:  Grossly intact to light touch all dermatomal regions.   Motor Function:  Fully intact motor function at hip, knee, foot and ankle    DTRs;  quadriceps and  achilles 2+.  No clonus and downgoing Babinski.    Vascular status:  DP and PT pulses 2+, brisk capillary refill, symmetric.     Other Findings:      Xrays:  Xrays of the bilateral knees with flexion were ordered and reviewed by me today. No fracture, subluxation. Mild to moderate right knee patellofemoral DJD with some joint space narrowing.   Kellgren Bryant 2 or greater noted.        ASSESSMENT:    1. Right Knee pain  2. Right knee osteoarthritis   Bilateral hip abd/core weakness    PLAN:    1.  PROCEDURE NOTE:   right KNEE ASPIRATION and INJECTION  After consent was obtained, time out was performed, including verification of patient ID, procedure, site and side, availability of information and equipment, review of safety issues, and agreement with consent, the procedure site was marked and the patient was prepped aseptically.    Using a sterile technique the right knee was prepped from the superior lateral knee approach. The knee joint was entered with a 18 gauge needle and 25 ml's of serous colored fluid was withdrawn.   The procedure was well tolerated.       A diagnostic and therapeutic injection of 1cc 40 mg kenalog and 1% lidocaine/0.25% bupivacaine was given under sterile technique using the same 18 gauge needle into the superior lateral knee site with patient in supine position.     The patient had no adverse reactions to the medication. Pain decreased. The patient was instructed to apply ice to the joint for 20 minutes and avoid strenuous activities for 24-36 hours following the injection. Patient was warned of possible blood sugar and/or blood pressure changes during that time. Following that time, patient can resume regular activities.   Watch for fever, or increased swelling or persistent pain in knee. Call or return to clinic prn if such symptoms occur or the knee fails to improve as anticipated.    2. Ice compresses prn pain  3. OTC pain medication as needed.   RTC in 2 weeks for recheck of knee.      All questions were answered, pt will contact us for questions or concerns in the interim.    I made the decision to obtain old records of the patient including previous notes and imaging. New imaging was ordered today of the extremity or extremities evaluated. I independently reviewed and interpreted the radiographs and/or MRIs today as well as prior imaging.

## 2023-06-30 DIAGNOSIS — A60.00 GENITAL HERPES: ICD-10-CM

## 2023-07-03 RX ORDER — VALACYCLOVIR HYDROCHLORIDE 500 MG/1
500 TABLET, FILM COATED ORAL DAILY
Qty: 30 TABLET | Refills: 0 | Status: SHIPPED | OUTPATIENT
Start: 2023-07-03 | End: 2023-11-27

## 2023-10-05 ENCOUNTER — PATIENT OUTREACH (OUTPATIENT)
Dept: ADMINISTRATIVE | Facility: HOSPITAL | Age: 53
End: 2023-10-05
Payer: COMMERCIAL

## 2023-10-05 DIAGNOSIS — Z12.31 ENCOUNTER FOR SCREENING MAMMOGRAM FOR MALIGNANT NEOPLASM OF BREAST: Primary | ICD-10-CM

## 2023-10-05 NOTE — PROGRESS NOTES
Population Health Chart Review & Patient Outreach Details:     Reason for Outreach Encounter:     []  Non-Compliant Report   [x]  Payor Report ([employer], PHN, BCBS, MSSP, MCIP, UHC, etc.)   []  Pre-Visit Chart Review     Updates Requested / Reviewed:     [x]  Care Everywhere    []     []  External Sources ([facility], [facility], DIS, etc.)   []  Care Team Updated    Patient Outreach Method:    [x]  Telephone Outreach Completed   [x] Successful   [] Left Voicemail   [] Unable to Contact (wrong number, no voicemail)  []  [facility] Outreach Sent  []  Letter Outreach Mailed  []  Fax Sent for External Records  []  External Records Upload    Health Maintenance Topics Addressed and Outreach Outcomes / Actions Taken:        [x]      Breast Cancer Screening [x]  Mammo Scheduled      []  External Records Requested     []  Added Reminder to Complete to Upcoming Primary Care Appt Notes     []  Patient Declined     []  Patient Will Call Back to Schedule     []  Patient Will Schedule with External Provider / Order Routed if Applicable             []       Cervical Cancer Screening []  Pap Scheduled      []  External Records Requested     []  Added Reminder to Complete to Upcoming Primary Care Appt Notes     []  Patient Declined     []  Patient Will Call Back to Schedule     []  Patient Will Schedule with External Provider               []          Colorectal Cancer Screening []  Colonoscopy Case Request or Referral Placed     []  External Records Requested     []  Added Reminder to Complete to Upcoming Primary Care Appt Notes     []  Patient Declined     []  Patient Will Call Back to Schedule     []  Patient Will Schedule with External Provider     []  Fit Kit Mailed (add the SmartPhrase under additional notes)     []  Reminded Patient to Complete Home Test             []      Diabetic Eye Exam []  Eye Camera Scheduled or Optometry Referral Placed     []  External Records Requested     []  Added Reminder to Complete to  Upcoming Primary Care Appt Notes     []  Patient Declined     []  Patient Will Call Back to Schedule     []  Patient Will Schedule with External Provider             []      Blood Pressure Control []  Primary Care Follow Up Visit Scheduled     []  Remote Blood Pressure Reading Captured     []  Added Reminder to Complete to Upcoming Primary Care Appt Notes     []  Patient Declined     []  Patient Will Call Back / Patient Will Send Portal Message with Reading     []  Patient Will Call Back to Schedule Provider Visit             []       HbA1c & Other Labs []  Lab Appt Scheduled for Due Labs     []  Primary Care Follow Up Visit Scheduled      []  Reminded Patient to Complete Home Test     []  Added Reminder to Complete to Upcoming Primary Care Appt Notes     []  Patient Declined     []  Patient Will Call Back to Schedule     []  Patient Will Schedule with External Provider / Order Routed if Applicable           []    Schedule Primary Care Appt []  Primary Care Appt Scheduled     []  Patient Declined     []  Patient Will Call Back to Schedule     []  Pt Established with External Provider & Updated Care Team             []      Medication Adherence []  Primary Care Appointment Scheduled     []  Added Reminder to Upcoming Primary Care Appt Notes     []  Patient Reminded to  Prescription     []  Patient Declined, Provider Notified if Needed     []  Sent Provider Message to Review and/or Add Exclusion to Problem List             []      Osteoporosis Screening []  DXA Appointment Scheduled     []  External Records Requested     []  Added Reminder to Complete to Upcoming Primary Care Appt Notes     []  Patient Declined     []  Patient Will Call Back to Schedule     []  Patient Will Schedule with External Provider / Order Routed if Applicable     Additional Care Coordinator Notes:     Patient will est care with a provider in [place] closer to home.     Further Action Needed If Patient Returns Outreach:     Due for  overdue HM (mammogram), need to get recent records if already done. If not done, orders/referrals need to be place and schedule. Please forward messages to me.

## 2023-10-06 ENCOUNTER — HOSPITAL ENCOUNTER (OUTPATIENT)
Dept: RADIOLOGY | Facility: HOSPITAL | Age: 53
Discharge: HOME | End: 2023-10-06
Attending: PHYSICIAN ASSISTANT
Payer: COMMERCIAL

## 2023-10-06 VITALS — HEIGHT: 63 IN | WEIGHT: 210 LBS | BODY MASS INDEX: 37.21 KG/M2

## 2023-10-06 DIAGNOSIS — Z12.31 ENCOUNTER FOR SCREENING MAMMOGRAM FOR MALIGNANT NEOPLASM OF BREAST: ICD-10-CM

## 2023-10-06 PROCEDURE — 77067 MAMMO DIGITAL SCREENING BILAT WITH TOMO: ICD-10-PCS | Mod: 26,,, | Performed by: RADIOLOGY

## 2023-10-06 PROCEDURE — 77063 MAMMO DIGITAL SCREENING BILAT WITH TOMO: ICD-10-PCS | Mod: 26,,, | Performed by: RADIOLOGY

## 2023-10-06 PROCEDURE — 77063 BREAST TOMOSYNTHESIS BI: CPT | Mod: 26,,, | Performed by: RADIOLOGY

## 2023-10-06 PROCEDURE — 77067 SCR MAMMO BI INCL CAD: CPT | Mod: TC

## 2023-10-06 PROCEDURE — 77067 SCR MAMMO BI INCL CAD: CPT | Mod: 26,,, | Performed by: RADIOLOGY

## 2023-11-27 ENCOUNTER — OFFICE VISIT (OUTPATIENT)
Dept: OBSTETRICS AND GYNECOLOGY | Facility: CLINIC | Age: 53
End: 2023-11-27
Payer: COMMERCIAL

## 2023-11-27 VITALS
DIASTOLIC BLOOD PRESSURE: 85 MMHG | WEIGHT: 219.81 LBS | SYSTOLIC BLOOD PRESSURE: 159 MMHG | BODY MASS INDEX: 38.95 KG/M2 | HEIGHT: 63 IN

## 2023-11-27 DIAGNOSIS — N95.0 POSTMENOPAUSAL BLEEDING: ICD-10-CM

## 2023-11-27 DIAGNOSIS — Z12.4 CERVICAL CANCER SCREENING: Primary | ICD-10-CM

## 2023-11-27 PROCEDURE — 3079F DIAST BP 80-89 MM HG: CPT | Mod: CPTII,S$GLB,, | Performed by: STUDENT IN AN ORGANIZED HEALTH CARE EDUCATION/TRAINING PROGRAM

## 2023-11-27 PROCEDURE — 1160F PR REVIEW ALL MEDS BY PRESCRIBER/CLIN PHARMACIST DOCUMENTED: ICD-10-PCS | Mod: CPTII,S$GLB,, | Performed by: STUDENT IN AN ORGANIZED HEALTH CARE EDUCATION/TRAINING PROGRAM

## 2023-11-27 PROCEDURE — 3008F PR BODY MASS INDEX (BMI) DOCUMENTED: ICD-10-PCS | Mod: CPTII,S$GLB,, | Performed by: STUDENT IN AN ORGANIZED HEALTH CARE EDUCATION/TRAINING PROGRAM

## 2023-11-27 PROCEDURE — 1159F PR MEDICATION LIST DOCUMENTED IN MEDICAL RECORD: ICD-10-PCS | Mod: CPTII,S$GLB,, | Performed by: STUDENT IN AN ORGANIZED HEALTH CARE EDUCATION/TRAINING PROGRAM

## 2023-11-27 PROCEDURE — 3077F SYST BP >= 140 MM HG: CPT | Mod: CPTII,S$GLB,, | Performed by: STUDENT IN AN ORGANIZED HEALTH CARE EDUCATION/TRAINING PROGRAM

## 2023-11-27 PROCEDURE — 88175 CYTOPATH C/V AUTO FLUID REDO: CPT | Performed by: STUDENT IN AN ORGANIZED HEALTH CARE EDUCATION/TRAINING PROGRAM

## 2023-11-27 PROCEDURE — 87624 HPV HI-RISK TYP POOLED RSLT: CPT | Performed by: STUDENT IN AN ORGANIZED HEALTH CARE EDUCATION/TRAINING PROGRAM

## 2023-11-27 PROCEDURE — 3077F PR MOST RECENT SYSTOLIC BLOOD PRESSURE >= 140 MM HG: ICD-10-PCS | Mod: CPTII,S$GLB,, | Performed by: STUDENT IN AN ORGANIZED HEALTH CARE EDUCATION/TRAINING PROGRAM

## 2023-11-27 PROCEDURE — 99386 PR PREVENTIVE VISIT,NEW,40-64: ICD-10-PCS | Mod: S$GLB,,, | Performed by: STUDENT IN AN ORGANIZED HEALTH CARE EDUCATION/TRAINING PROGRAM

## 2023-11-27 PROCEDURE — 1160F RVW MEDS BY RX/DR IN RCRD: CPT | Mod: CPTII,S$GLB,, | Performed by: STUDENT IN AN ORGANIZED HEALTH CARE EDUCATION/TRAINING PROGRAM

## 2023-11-27 PROCEDURE — 99999 PR PBB SHADOW E&M-EST. PATIENT-LVL III: ICD-10-PCS | Mod: PBBFAC,,, | Performed by: STUDENT IN AN ORGANIZED HEALTH CARE EDUCATION/TRAINING PROGRAM

## 2023-11-27 PROCEDURE — 3079F PR MOST RECENT DIASTOLIC BLOOD PRESSURE 80-89 MM HG: ICD-10-PCS | Mod: CPTII,S$GLB,, | Performed by: STUDENT IN AN ORGANIZED HEALTH CARE EDUCATION/TRAINING PROGRAM

## 2023-11-27 PROCEDURE — 99999 PR PBB SHADOW E&M-EST. PATIENT-LVL III: CPT | Mod: PBBFAC,,, | Performed by: STUDENT IN AN ORGANIZED HEALTH CARE EDUCATION/TRAINING PROGRAM

## 2023-11-27 PROCEDURE — 1159F MED LIST DOCD IN RCRD: CPT | Mod: CPTII,S$GLB,, | Performed by: STUDENT IN AN ORGANIZED HEALTH CARE EDUCATION/TRAINING PROGRAM

## 2023-11-27 PROCEDURE — 3008F BODY MASS INDEX DOCD: CPT | Mod: CPTII,S$GLB,, | Performed by: STUDENT IN AN ORGANIZED HEALTH CARE EDUCATION/TRAINING PROGRAM

## 2023-11-27 PROCEDURE — 99386 PREV VISIT NEW AGE 40-64: CPT | Mod: S$GLB,,, | Performed by: STUDENT IN AN ORGANIZED HEALTH CARE EDUCATION/TRAINING PROGRAM

## 2023-11-27 RX ORDER — METHOCARBAMOL 500 MG/1
500 TABLET, FILM COATED ORAL 3 TIMES DAILY
COMMUNITY
Start: 2023-09-29 | End: 2023-11-27

## 2023-11-27 RX ORDER — NITROFURANTOIN 25; 75 MG/1; MG/1
CAPSULE ORAL
COMMUNITY
Start: 2023-09-29 | End: 2023-11-27

## 2023-11-27 NOTE — PROGRESS NOTES
HPI: Pt is a 53 y.o.  female who presents for routine annual exam.   Postmenopausal.     Cervical cancer screening: up to date   Most recent: 2019 NILM/HR HPV neg    History abnormal: no  Breast cancer screening: UTD TC 8%  Colon cancer screening: UTD 2021; 10-yr interval    Family history breast cancer: no  Family history ovarian cancer: no  Family history colon cancer: no        ROS:  GENERAL: Feeling well overall. Denies fever or chills.   SKIN: Denies rash or lesions.   HEAD: Denies head injury or headache.   NODES: Denies enlarged lymph nodes.   CHEST: Denies chest pain or shortness of breath.   CARDIOVASCULAR: Denies palpitations or left sided chest pain.   ABDOMEN: No abdominal pain, constipation, diarrhea, nausea or vomiting   URINARY: No dysuria, hematuria, or burning on urination.  REPRODUCTIVE: See HPI.   BREASTS: Denies pain, lumps, or nipple discharge.   HEMATOLOGIC: No easy bruisability or excessive bleeding.   MUSCULOSKELETAL: Denies joint pain or swelling.   NEUROLOGIC: Denies syncope or weakness.   PSYCHIATRIC: Denies acute depression or anxiety     PE:   APPEARANCE: Well nourished, well developed, Black or  female in no acute distress.  NODES: no inguinal lymphadenopathy  BREASTS: Symmetrical, no skin changes or visible lesions. No palpable masses, nipple discharge or adenopathy bilaterally.  ABDOMEN: Soft. No tenderness or masses. No distention.   VULVA: No lesions. Normal external female genitalia.  URETHRAL MEATUS: Normal size and location, no lesions, no prolapse.  URETHRA: No masses, tenderness, or prolapse.  VAGINA: Moist. No lesions or lacerations noted. No abnormal discharge present. No odor present.   CERVIX: No lesions or discharge. No cervical motion tenderness.    Difficult to visualize very anterior  UTERUS: Normal size, regular shape, mobile, non-tender.  ADNEXA: No tenderness. No fullness or masses palpated in the adnexal regions.   ANUS PERINEUM:  Normal.      Diagnosis:  1. Cervical cancer screening    2. Postmenopausal bleeding        Plan:     Orders Placed This Encounter    HPV High Risk Genotypes, PCR    US Pelvis Comp with Transvag NON-OB (xpd    Liquid-Based Pap Smear, Screening       Patient was counseled today on the current  ACS guidelines for cervical cytology screening as well as the current recommendations for breast cancer screening.   She was counseled to follow up with her PCP for other routine health maintenance.    Pap/ Pap+HPV next due: pending results    RTC 1 year or sooner based on results or recurrence of symptoms

## 2023-11-30 LAB
HPV HR 12 DNA SPEC QL NAA+PROBE: NEGATIVE
HPV16 AG SPEC QL: NEGATIVE
HPV18 DNA SPEC QL NAA+PROBE: NEGATIVE

## 2023-12-04 LAB
FINAL PATHOLOGIC DIAGNOSIS: NORMAL
Lab: NORMAL

## 2023-12-20 ENCOUNTER — OFFICE VISIT (OUTPATIENT)
Dept: PRIMARY CARE CLINIC | Facility: CLINIC | Age: 53
End: 2023-12-20
Payer: COMMERCIAL

## 2023-12-20 VITALS
SYSTOLIC BLOOD PRESSURE: 116 MMHG | TEMPERATURE: 96 F | DIASTOLIC BLOOD PRESSURE: 76 MMHG | HEART RATE: 74 BPM | WEIGHT: 218.5 LBS | BODY MASS INDEX: 38.71 KG/M2 | OXYGEN SATURATION: 98 % | HEIGHT: 63 IN | RESPIRATION RATE: 18 BRPM

## 2023-12-20 DIAGNOSIS — A60.00 GENITAL HERPES SIMPLEX, UNSPECIFIED SITE: ICD-10-CM

## 2023-12-20 DIAGNOSIS — Z00.00 ANNUAL PHYSICAL EXAM: ICD-10-CM

## 2023-12-20 DIAGNOSIS — Z23 NEED FOR VACCINATION: ICD-10-CM

## 2023-12-20 DIAGNOSIS — Z76.89 ENCOUNTER TO ESTABLISH CARE: Primary | ICD-10-CM

## 2023-12-20 DIAGNOSIS — I10 PRIMARY HYPERTENSION: ICD-10-CM

## 2023-12-20 PROCEDURE — 99396 PR PREVENTIVE VISIT,EST,40-64: ICD-10-PCS | Mod: S$GLB,,, | Performed by: STUDENT IN AN ORGANIZED HEALTH CARE EDUCATION/TRAINING PROGRAM

## 2023-12-20 PROCEDURE — 1160F RVW MEDS BY RX/DR IN RCRD: CPT | Mod: CPTII,S$GLB,, | Performed by: STUDENT IN AN ORGANIZED HEALTH CARE EDUCATION/TRAINING PROGRAM

## 2023-12-20 PROCEDURE — 3074F PR MOST RECENT SYSTOLIC BLOOD PRESSURE < 130 MM HG: ICD-10-PCS | Mod: CPTII,S$GLB,, | Performed by: STUDENT IN AN ORGANIZED HEALTH CARE EDUCATION/TRAINING PROGRAM

## 2023-12-20 PROCEDURE — 3078F DIAST BP <80 MM HG: CPT | Mod: CPTII,S$GLB,, | Performed by: STUDENT IN AN ORGANIZED HEALTH CARE EDUCATION/TRAINING PROGRAM

## 2023-12-20 PROCEDURE — 93005 ELECTROCARDIOGRAM TRACING: CPT | Mod: S$GLB,,, | Performed by: STUDENT IN AN ORGANIZED HEALTH CARE EDUCATION/TRAINING PROGRAM

## 2023-12-20 PROCEDURE — 1159F MED LIST DOCD IN RCRD: CPT | Mod: CPTII,S$GLB,, | Performed by: STUDENT IN AN ORGANIZED HEALTH CARE EDUCATION/TRAINING PROGRAM

## 2023-12-20 PROCEDURE — 3008F PR BODY MASS INDEX (BMI) DOCUMENTED: ICD-10-PCS | Mod: CPTII,S$GLB,, | Performed by: STUDENT IN AN ORGANIZED HEALTH CARE EDUCATION/TRAINING PROGRAM

## 2023-12-20 PROCEDURE — 93005 EKG 12-LEAD: ICD-10-PCS | Mod: S$GLB,,, | Performed by: STUDENT IN AN ORGANIZED HEALTH CARE EDUCATION/TRAINING PROGRAM

## 2023-12-20 PROCEDURE — 3074F SYST BP LT 130 MM HG: CPT | Mod: CPTII,S$GLB,, | Performed by: STUDENT IN AN ORGANIZED HEALTH CARE EDUCATION/TRAINING PROGRAM

## 2023-12-20 PROCEDURE — 99396 PREV VISIT EST AGE 40-64: CPT | Mod: S$GLB,,, | Performed by: STUDENT IN AN ORGANIZED HEALTH CARE EDUCATION/TRAINING PROGRAM

## 2023-12-20 PROCEDURE — 99999 PR PBB SHADOW E&M-EST. PATIENT-LVL IV: ICD-10-PCS | Mod: PBBFAC,,, | Performed by: STUDENT IN AN ORGANIZED HEALTH CARE EDUCATION/TRAINING PROGRAM

## 2023-12-20 PROCEDURE — 3008F BODY MASS INDEX DOCD: CPT | Mod: CPTII,S$GLB,, | Performed by: STUDENT IN AN ORGANIZED HEALTH CARE EDUCATION/TRAINING PROGRAM

## 2023-12-20 PROCEDURE — 1159F PR MEDICATION LIST DOCUMENTED IN MEDICAL RECORD: ICD-10-PCS | Mod: CPTII,S$GLB,, | Performed by: STUDENT IN AN ORGANIZED HEALTH CARE EDUCATION/TRAINING PROGRAM

## 2023-12-20 PROCEDURE — 93010 EKG 12-LEAD: ICD-10-PCS | Mod: S$GLB,,, | Performed by: INTERNAL MEDICINE

## 2023-12-20 PROCEDURE — 3078F PR MOST RECENT DIASTOLIC BLOOD PRESSURE < 80 MM HG: ICD-10-PCS | Mod: CPTII,S$GLB,, | Performed by: STUDENT IN AN ORGANIZED HEALTH CARE EDUCATION/TRAINING PROGRAM

## 2023-12-20 PROCEDURE — 1160F PR REVIEW ALL MEDS BY PRESCRIBER/CLIN PHARMACIST DOCUMENTED: ICD-10-PCS | Mod: CPTII,S$GLB,, | Performed by: STUDENT IN AN ORGANIZED HEALTH CARE EDUCATION/TRAINING PROGRAM

## 2023-12-20 PROCEDURE — 93010 ELECTROCARDIOGRAM REPORT: CPT | Mod: S$GLB,,, | Performed by: INTERNAL MEDICINE

## 2023-12-20 PROCEDURE — 99999 PR PBB SHADOW E&M-EST. PATIENT-LVL IV: CPT | Mod: PBBFAC,,, | Performed by: STUDENT IN AN ORGANIZED HEALTH CARE EDUCATION/TRAINING PROGRAM

## 2023-12-20 RX ORDER — AMLODIPINE BESYLATE 5 MG/1
5 TABLET ORAL DAILY
Qty: 90 TABLET | Refills: 3 | Status: SHIPPED | OUTPATIENT
Start: 2023-12-20

## 2023-12-20 RX ORDER — VALACYCLOVIR HYDROCHLORIDE 1 G/1
1000 TABLET, FILM COATED ORAL EVERY 12 HOURS
Qty: 14 TABLET | Refills: 5 | Status: SHIPPED | OUTPATIENT
Start: 2023-12-20

## 2023-12-20 RX ORDER — ZOSTER VACCINE RECOMBINANT, ADJUVANTED 50 MCG/0.5
0.5 KIT INTRAMUSCULAR ONCE
Qty: 1 EACH | Refills: 0 | Status: SHIPPED | OUTPATIENT
Start: 2023-12-20 | End: 2023-12-20

## 2023-12-20 NOTE — PROGRESS NOTES
"Subjective:       Patient ID: Pat Yung is a 53 y.o. female.    Chief Complaint: Establish Care      HPI:  53 y.o. female presents to [facility] to establish care    Acute concerns?: None today's visit, here for annual    HTN:  Home BP log?: up to 180s systolic, controlled on amlodipine  Medications: amlodipine 5 mg  Compliance?: Almost never misses    Diet?: Trying to eat less fast food and avoid sodium  Exercise?: Walks a lot      Last PCP?: Dr. Olivia Mendoza  Allergies: Nexium (rash)  Medical History: Essential HTN, HSV, GERD, HLD, Vit D deficiency  Medications: amlodipine 5 mg, valacyclovir  Surgical History: c/s 1998, tubal ligation 1998  Family History: No known cancers; sister with thyroid disease  Social History: Quit smoking Hookah a long time ago, no EtOH, no illcits        Review of Systems   Constitutional:  Positive for fatigue (Occasional symptom of fatigue). Negative for chills, diaphoresis and fever.   HENT:  Negative for congestion, sinus pressure, sneezing and sore throat.    Respiratory:  Negative for cough and shortness of breath.    Cardiovascular:  Positive for palpitations (Intermittent. Rare. Last month 3 episodes. Very brief, feels like tachycardia). Negative for chest pain.   Gastrointestinal:  Negative for abdominal pain, diarrhea, nausea and vomiting.   Endocrine: Positive for cold intolerance (History of anmia). Negative for heat intolerance.   Skin:  Positive for rash (Dry skin upepr right arm). Negative for wound.   Neurological:  Negative for dizziness, light-headedness and headaches.       Objective:      Vitals:    12/20/23 0853   BP: 116/76   BP Location: Right arm   Patient Position: Sitting   BP Method: Medium (Manual)   Pulse: 74   Resp: 18   Temp: 96.2 °F (35.7 °C)   TempSrc: Temporal   SpO2: 98%   Weight: 99.1 kg (218 lb 7.6 oz)   Height: 5' 3" (1.6 m)     Physical Exam        Lab Results   Component Value Date     05/11/2022    K 4.7 05/11/2022     " "05/11/2022    CO2 28 05/11/2022    BUN 15 05/11/2022    CREATININE 0.7 05/11/2022    ANIONGAP 8 05/11/2022     Lab Results   Component Value Date    HGBA1C 5.4 05/11/2022     No results found for: "BNP", "BNPTRIAGEBLO"    Lab Results   Component Value Date    WBC 8.02 05/11/2022    HGB 12.6 05/11/2022    HCT 40.5 05/11/2022     05/11/2022    GRAN 3.9 05/11/2022    GRAN 48.1 05/11/2022     Lab Results   Component Value Date    CHOL 237 (H) 05/11/2022    HDL 70 05/11/2022    LDLCALC 144.0 05/11/2022    TRIG 115 05/11/2022          Current Outpatient Medications:     amLODIPine (NORVASC) 5 MG tablet, Take 1 tablet (5 mg total) by mouth once daily., Disp: 90 tablet, Rfl: 3    valACYclovir (VALTREX) 1000 MG tablet, Take 1 tablet (1,000 mg total) by mouth every 12 (twelve) hours. Begin taking at first sign of outbreak, Disp: 14 tablet, Rfl: 5    varicella-zoster gE-AS01B, PF, (SHINGRIX, PF,) 50 mcg/0.5 mL injection, Inject 0.5 mLs into the muscle once. for 1 dose, Disp: 1 each, Rfl: 0        Assessment:       1. Encounter to establish care    2. Annual physical exam    3. Primary hypertension    4. Genital herpes simplex, unspecified site    5. Need for vaccination           Plan:       Encounter to establish care  Annual physical exam  Primary hypertension  -     amLODIPine (NORVASC) 5 MG tablet; Take 1 tablet (5 mg total) by mouth once daily.  Dispense: 90 tablet; Refill: 3  -     TSH; Future; Expected date: 12/20/2023  -     URINALYSIS  -     EKG 12-lead  -     Comprehensive Metabolic Panel; Future; Expected date: 12/20/2023  -     CBC Auto Differential; Future; Expected date: 12/20/2023  -     Lipid Panel; Future; Expected date: 12/20/2023  - Health maintenance items reviewed  - Patient is fasting for labs today    Genital herpes simplex, unspecified site  -     valACYclovir (VALTREX) 1000 MG tablet; Take 1 tablet (1,000 mg total) by mouth every 12 (twelve) hours. Begin taking at first sign of outbreak  " Dispense: 14 tablet; Refill: 5    Need for vaccination  -     varicella-zoster gE-AS01B, PF, (SHINGRIX, PF,) 50 mcg/0.5 mL injection; Inject 0.5 mLs into the muscle once. for 1 dose  Dispense: 1 each; Refill: 0    Will notify clinic if frequency or symptoms with palpitations increase in frequency or duration or if develops associated symptoms and can consider Electrophysiology referral    RTC PRN

## 2024-01-16 ENCOUNTER — TELEPHONE (OUTPATIENT)
Dept: PRIMARY CARE CLINIC | Facility: CLINIC | Age: 54
End: 2024-01-16
Payer: COMMERCIAL

## 2024-01-16 NOTE — TELEPHONE ENCOUNTER
----- Message from Jero Weinberg MD sent at 1/16/2024 10:10 AM CST -----  Please schedule visit for chest pain    Jero Weinberg MD

## 2024-02-05 ENCOUNTER — OFFICE VISIT (OUTPATIENT)
Dept: PRIMARY CARE CLINIC | Facility: CLINIC | Age: 54
End: 2024-02-05
Payer: COMMERCIAL

## 2024-02-05 VITALS
HEART RATE: 80 BPM | SYSTOLIC BLOOD PRESSURE: 136 MMHG | WEIGHT: 217.63 LBS | HEIGHT: 63 IN | BODY MASS INDEX: 38.56 KG/M2 | RESPIRATION RATE: 18 BRPM | OXYGEN SATURATION: 98 % | DIASTOLIC BLOOD PRESSURE: 76 MMHG

## 2024-02-05 DIAGNOSIS — Z09 FOLLOW-UP EXAM: ICD-10-CM

## 2024-02-05 DIAGNOSIS — M25.561 CHRONIC PAIN OF RIGHT KNEE: ICD-10-CM

## 2024-02-05 DIAGNOSIS — R00.2 INTERMITTENT PALPITATIONS: ICD-10-CM

## 2024-02-05 DIAGNOSIS — M94.0 COSTOCHONDRITIS: Primary | ICD-10-CM

## 2024-02-05 DIAGNOSIS — G89.29 CHRONIC PAIN OF RIGHT KNEE: ICD-10-CM

## 2024-02-05 PROCEDURE — 3008F BODY MASS INDEX DOCD: CPT | Mod: CPTII,S$GLB,, | Performed by: STUDENT IN AN ORGANIZED HEALTH CARE EDUCATION/TRAINING PROGRAM

## 2024-02-05 PROCEDURE — 99214 OFFICE O/P EST MOD 30 MIN: CPT | Mod: S$GLB,,, | Performed by: STUDENT IN AN ORGANIZED HEALTH CARE EDUCATION/TRAINING PROGRAM

## 2024-02-05 PROCEDURE — 99999 PR PBB SHADOW E&M-EST. PATIENT-LVL IV: CPT | Mod: PBBFAC,,, | Performed by: STUDENT IN AN ORGANIZED HEALTH CARE EDUCATION/TRAINING PROGRAM

## 2024-02-05 PROCEDURE — 1160F RVW MEDS BY RX/DR IN RCRD: CPT | Mod: CPTII,S$GLB,, | Performed by: STUDENT IN AN ORGANIZED HEALTH CARE EDUCATION/TRAINING PROGRAM

## 2024-02-05 PROCEDURE — 1159F MED LIST DOCD IN RCRD: CPT | Mod: CPTII,S$GLB,, | Performed by: STUDENT IN AN ORGANIZED HEALTH CARE EDUCATION/TRAINING PROGRAM

## 2024-02-05 PROCEDURE — 3075F SYST BP GE 130 - 139MM HG: CPT | Mod: CPTII,S$GLB,, | Performed by: STUDENT IN AN ORGANIZED HEALTH CARE EDUCATION/TRAINING PROGRAM

## 2024-02-05 PROCEDURE — 3078F DIAST BP <80 MM HG: CPT | Mod: CPTII,S$GLB,, | Performed by: STUDENT IN AN ORGANIZED HEALTH CARE EDUCATION/TRAINING PROGRAM

## 2024-02-05 RX ORDER — DICLOFENAC SODIUM 10 MG/G
2 GEL TOPICAL 4 TIMES DAILY
Qty: 100 G | Refills: 5 | Status: SHIPPED | OUTPATIENT
Start: 2024-02-05

## 2024-02-05 NOTE — PROGRESS NOTES
Subjective:       Patient ID: Pat Yung is a 53 y.o. female.    Chief Complaint: Follow-up (ER)    HPI:  53 y.o. female presents to [facility] for ED follow-up for chest pain 1/14/2024    Patient had EKG with at that time where anterior infarct could not be rules out. CXR was unremarkable, Troponin I was 0.011. Was prescribed indomethacin with suspected chest wall pains.    Today, patient repots that pain has resolved since that visit. Not using indomethacin. Feels that gas was likely the culprit.    Frequency?: This was only episode  Quality?: Thought having heart attack with cracking pain in mid left chest  Duration?: 3 weeks  Interventions?: Lying, ginger ale  Provoking factors?: Movement could worsen if turned  Present at rest?: No, but not high activity required to re-create  Nausea?:  No  Sweats?: No  Jaw/arm numbness/tingling?: No  History of CAD?: No    Patient reports has swelling in right knee that has required drainage in the past. Can hurt if having to lift leg. No longer following with Orthopaedics. No interest in PT at this time. Does not desire to get back in with Orthopaedics. No past surgery. Has been present for some time. Would like referral to drain again.    Review of Systems   Constitutional:  Negative for chills, diaphoresis, fatigue and fever.   HENT:  Negative for congestion, sinus pressure, sneezing and sore throat.    Respiratory:  Negative for cough and shortness of breath.    Cardiovascular:  Negative for chest pain and palpitations (Feels can hear at times, can feel every 2-3 days).   Gastrointestinal:  Negative for abdominal pain, diarrhea, nausea and vomiting.   Musculoskeletal:  Positive for arthralgias (Right knee) and joint swelling. Negative for myalgias.   Skin:  Negative for rash and wound.   Neurological:  Negative for weakness and numbness.       Objective:      Vitals:    02/05/24 1437   BP: 136/76   BP Location: Right arm   Patient Position: Sitting   BP Method:  "Medium (Manual)   Pulse: 80   Resp: 18   SpO2: 98%   Weight: 98.7 kg (217 lb 9.5 oz)   Height: 5' 3" (1.6 m)     Physical Exam  Vitals reviewed.   Constitutional:       General: She is not in acute distress.     Appearance: Normal appearance. She is not ill-appearing.   HENT:      Head: Normocephalic and atraumatic.   Eyes:      General:         Right eye: No discharge.         Left eye: No discharge.      Conjunctiva/sclera: Conjunctivae normal.   Cardiovascular:      Rate and Rhythm: Normal rate and regular rhythm.      Heart sounds: Normal heart sounds. No murmur heard.  Pulmonary:      Effort: Pulmonary effort is normal.      Breath sounds: Normal breath sounds.   Musculoskeletal:         General: No deformity.      Cervical back: Neck supple. No rigidity.   Lymphadenopathy:      Cervical: No cervical adenopathy.   Skin:     General: Skin is warm and dry.      Coloration: Skin is not jaundiced.   Neurological:      General: No focal deficit present.      Mental Status: She is alert and oriented to person, place, and time.   Psychiatric:         Mood and Affect: Mood normal.         Behavior: Behavior normal.             Lab Results   Component Value Date     01/14/2024    K 3.5 01/14/2024     01/14/2024    CO2 24 01/14/2024    BUN 18 01/14/2024    CREATININE 0.7 01/14/2024    ANIONGAP 8 01/14/2024     Lab Results   Component Value Date    HGBA1C 5.4 05/11/2022     Lab Results   Component Value Date    BNP <10 01/14/2024       Lab Results   Component Value Date    WBC 7.53 01/14/2024    HGB 13.1 01/14/2024    HCT 40.6 01/14/2024     01/14/2024    GRAN 3.5 01/14/2024    GRAN 46.2 01/14/2024     Lab Results   Component Value Date    CHOL 242 (H) 12/20/2023    HDL 63 12/20/2023    LDLCALC 161.4 (H) 12/20/2023    TRIG 88 12/20/2023          Current Outpatient Medications:     amLODIPine (NORVASC) 5 MG tablet, Take 1 tablet (5 mg total) by mouth once daily., Disp: 90 tablet, Rfl: 3    valACYclovir " (VALTREX) 1000 MG tablet, Take 1 tablet (1,000 mg total) by mouth every 12 (twelve) hours. Begin taking at first sign of outbreak, Disp: 14 tablet, Rfl: 5    diclofenac sodium (VOLTAREN) 1 % Gel, Apply 2 g topically 4 (four) times daily., Disp: 100 g, Rfl: 5    indomethacin (INDOCIN) 25 MG capsule, Take 1 capsule (25 mg total) by mouth 3 (three) times daily. (Patient not taking: Reported on 2/5/2024), Disp: 20 capsule, Rfl: 0        Assessment:       1. Costochondritis    2. Follow-up exam    3. Chronic pain of right knee    4. Intermittent palpitations           Plan:       Costochondritis  Follow-up exam  Intermittent palpitations  -     Holter monitor - 48 hour; Future  - Will monitor palpitations for rhythm when present  - Low concern for Cardiac origin at this time    Chronic pain of right knee  -     diclofenac sodium (VOLTAREN) 1 % Gel; Apply 2 g topically 4 (four) times daily.  Dispense: 100 g; Refill: 5  -     Ambulatory referral/consult to Orthopedics; Future; Expected date: 02/12/2024  - RICE therapy explained and recommended  - AAOS knee conditioning program provided today's visit    RTC PRN

## 2024-02-23 ENCOUNTER — OFFICE VISIT (OUTPATIENT)
Dept: ORTHOPEDICS | Facility: CLINIC | Age: 54
End: 2024-02-23
Payer: COMMERCIAL

## 2024-02-23 ENCOUNTER — TELEPHONE (OUTPATIENT)
Dept: ORTHOPEDICS | Facility: CLINIC | Age: 54
End: 2024-02-23
Payer: COMMERCIAL

## 2024-02-23 VITALS
HEIGHT: 63 IN | BODY MASS INDEX: 38.46 KG/M2 | DIASTOLIC BLOOD PRESSURE: 80 MMHG | WEIGHT: 217.06 LBS | SYSTOLIC BLOOD PRESSURE: 135 MMHG | HEART RATE: 77 BPM

## 2024-02-23 DIAGNOSIS — M25.461 EFFUSION OF RIGHT KNEE: Primary | ICD-10-CM

## 2024-02-23 DIAGNOSIS — M25.561 CHRONIC PAIN OF RIGHT KNEE: ICD-10-CM

## 2024-02-23 DIAGNOSIS — G89.29 CHRONIC PAIN OF RIGHT KNEE: ICD-10-CM

## 2024-02-23 DIAGNOSIS — M25.561 RIGHT KNEE PAIN, UNSPECIFIED CHRONICITY: Primary | ICD-10-CM

## 2024-02-23 PROCEDURE — 99204 OFFICE O/P NEW MOD 45 MIN: CPT | Mod: S$GLB,,,

## 2024-02-23 PROCEDURE — 3079F DIAST BP 80-89 MM HG: CPT | Mod: CPTII,S$GLB,,

## 2024-02-23 PROCEDURE — 3008F BODY MASS INDEX DOCD: CPT | Mod: CPTII,S$GLB,,

## 2024-02-23 PROCEDURE — 1159F MED LIST DOCD IN RCRD: CPT | Mod: CPTII,S$GLB,,

## 2024-02-23 PROCEDURE — 99999 PR PBB SHADOW E&M-EST. PATIENT-LVL IV: CPT | Mod: PBBFAC,,,

## 2024-02-23 PROCEDURE — 3075F SYST BP GE 130 - 139MM HG: CPT | Mod: CPTII,S$GLB,,

## 2024-02-23 NOTE — TELEPHONE ENCOUNTER
----- Message from Tiffany Ortiz LPN sent at 2/23/2024 11:24 AM CST -----  Regarding: FW: Appt  Contact: pt [phone]  Did you call this patient?  ----- Message -----  From: Charli Grove  Sent: 2/23/2024  11:14 AM CST  To: [facility] MARCELINO Staff  Subject: Appt                                             Missed Callback    Pt returning callback from missed call. Requesting to speak with somebody in Zhou Farris's   office. Please call pt @[phone]

## 2024-02-23 NOTE — PROGRESS NOTES
Patient ID: Pat Yung is a 53 y.o. female    Pain of the Right Knee      History of Present Illness:    Pat Yung presents to clinic for right leg effusion. Patient denies known MARY. The pain started 1 years ago and is becoming progressively worse.  Pain is located over (points to) medial and lateral knee. She reports that the pain is a 8 /10 sharp pain toda. The pain is affecting ADLs and limiting desired level of activity. Denies numbness, tingling, radiation and inability to bear weight.  Pain is 10 /10 at its worst.     Denies previous history of gout.  She had this drained last year with subsequent steroid injection which only lasted about 2 weeks.  She has iced and elevated as well.  She would like to avoid surgery if possible.    Mechanical symptoms: +  Instability: -    Occupation: [employer] employee     Ambulating: unassisted  Diabetic: no  Smoking: no  Hx of DVT/PE: no    PAST MEDICAL HISTORY:   Past Medical History:   Diagnosis Date    GERD (gastroesophageal reflux disease)     Herpes simplex without mention of complication     Hyperlipidemia     Hypertension     Ulcer     Vitamin D deficiency      PAST SURGICAL HISTORY:   Past Surgical History:   Procedure Laterality Date     SECTION  1998    x 1    COLONOSCOPY N/A 2021    Procedure: COLONOSCOPY;  Surgeon: Jesu Canas MD;  Location: [facility];  Service: Endoscopy;  Laterality: N/A;  covid  stbernard -ml    ESOPHAGOGASTRODUODENOSCOPY  2022    ESOPHAGOGASTRODUODENOSCOPY N/A 2022    Procedure: EGD (ESOPHAGOGASTRODUODENOSCOPY);  Surgeon: Bhupendra Delacruz MD;  Location: [facility];  Service: Endoscopy;  Laterality: N/A;    TUBAL LIGATION       FAMILY HISTORY:   Family History   Problem Relation Age of Onset    Diabetes Maternal Grandmother     Heart disease Maternal Grandmother     Kidney disease Maternal Grandmother     Stroke Maternal Grandmother     Vision loss Maternal Grandmother     COPD Maternal  "Grandfather     Heart disease Maternal Grandfather     Hyperlipidemia Maternal Grandfather     Hypertension Maternal Grandfather     Stroke Maternal Grandfather     Vision loss Paternal Grandmother     Sarcoidosis Mother     Vitiligo Mother     Ovarian cancer Neg Hx     Colon cancer Neg Hx     Breast cancer Neg Hx      SOCIAL HISTORY:   Social History     Occupational History    Not on file   Tobacco Use    Smoking status: Never    Smokeless tobacco: Never   Substance and Sexual Activity    Alcohol use: Not Currently     Comment: occasionally    Drug use: No    Sexual activity: Yes     Partners: Male     Birth control/protection: Surgical        MEDICATIONS:   Current Outpatient Medications:     amLODIPine (NORVASC) 5 MG tablet, Take 1 tablet (5 mg total) by mouth once daily., Disp: 90 tablet, Rfl: 3    diclofenac sodium (VOLTAREN) 1 % Gel, Apply 2 g topically 4 (four) times daily., Disp: 100 g, Rfl: 5    valACYclovir (VALTREX) 1000 MG tablet, Take 1 tablet (1,000 mg total) by mouth every 12 (twelve) hours. Begin taking at first sign of outbreak, Disp: 14 tablet, Rfl: 5  ALLERGIES:   Review of patient's allergies indicates:   Allergen Reactions    Nexium [esomeprazole magnesium] Hives     Patient says that began having "break-outs" on her abdomen         Physical Exam     Vitals:    02/23/24 1513   BP: 135/80   Pulse: 77     Alert and oriented to person, place and time. No acute distress. Well-groomed, not ill appearing. Pupils round and reactive, normal respiratory effort, no audible wheezing.     OBSERVATION / INSPECTION   Gait:   Nonantalgic   Alignment:  Neutral   Scars:   None   Muscle atrophy: None  Effusion:  Moderate   Warmth:  None   Discoloration:   None     TENDERNESS                 Patella     Negative    Peripatellar medial    +   Peripatellar lateral   +   Patellar tendon   Negative   Quad tendon     Negative    Prepatellar Bursa   Negative     Tibial tubercle    Negative    Pes " anserine/HS   Negative      ITB     Negative      LCL     Negative  MFC     Negative  LFC     Negative  MCL      Negative  Medial Joint Line    +   Lateral Joint Line   Negative  Quadriceps    Negative  Hamstring     Negative            Range of  Motion:        Left knee:   0-140°  Right knee:    0-90° *      Patellofemoral examination:     Patella position    Centered  Crepitus (PF):    Absent   Lateral tilt:    Normal   J-sign:     None   Patellofemoral grind:   No pain       MENISCAL SIGNS:     Pain on terminal extension:  +  Pain on terminal flexion:  +  Zarinas maneuver:  +    EXTREMITY NEURO-VASCULAR EXAMINATION:   Sensation:  Grossly intact to light touch all dermatomal regions.   Motor Function:  Fully intact motor function at hip, knee, foot and ankle    DTRs;  quadriceps and  achilles 2+.  No clonus and downgoing Babinski.    Vascular status:  DP and PT pulses 2+, brisk capillary refill, symmetric.     Imaging:     Bilateral knee X-rays ordered/reviewed by me showing no evidence of fracture or dislocation. There is no obvious malalignment. No evidence of masses, lesions or foreign bodies.  Mild degenerative changes, Kellgren Bryant grade 2.      Assessment & Plan    Effusion of right knee  -     Cancel: MRI Knee Without Contrast Right; Future; Expected date: 02/23/2024  -     MRI Knee Without Contrast Right; Future; Expected date: 02/23/2024    Chronic pain of right knee  -     Ambulatory referral/consult to Orthopedics  -     Cancel: MRI Knee Without Contrast Right; Future; Expected date: 02/23/2024  -     MRI Knee Without Contrast Right; Future; Expected date: 02/23/2024       I made the decision to obtain old records of the patient including previous notes and imaging. New imaging was ordered today of the extremity or extremities evaluated. I independently reviewed and interpreted the radiographs and/or MRIs/CT scan today as well as prior imaging.    We discussed at length different treatment  options including conservative vs surgical management. These include anti-inflammatories, acetaminophen, rest, ice, heat, formal physical therapy including strengthening and stretching exercises, home exercise programs, injections, dry needling, and finally surgical intervention.      Patient here for recurrent right knee effusions.  She has had this drained about 1 year ago with overall no significant improvement.  We discussed repeat aspiration and injection today however I do not believe this will be a long term solution.  Also discussed MRI of the right knee to evaluate for cause of effusion.  Patient elected to proceed with MRI, requested open MRI.  Once MRI results are received, we will discuss repeat aspiration/injection versus surgical intervention if appropriate.  She may continue ice and elevation.  Recommended compression as well.    Follow up:  MRI results  X-rays next visit:  None    All questions were answered and patient is agreeable to the above plan.

## 2024-02-23 NOTE — TELEPHONE ENCOUNTER
Returned the pt's call to inform her that x-rays are needed prior to see the provider. Pt v/u   69y Female with HTN, Bipolar disorder, pw 2 weeks of blurry vision in left eye, difficulty speaking and difficlty walking. prior to 2 weeks ago has been wearing glasses, walks on her own but she has been having difficulty walking now. unsure if its due to weakness, diff seeing or dizziness. also feels like she cant express what she is trying to say. has not been seen by her drs for this. prior to 2 weeks ago she had a terrible headache for which she saw her pcp and was treated for with steroids and other meds. the headache is now resolved

## 2024-05-09 DIAGNOSIS — M54.50 PAIN, LUMBAR REGION: Primary | ICD-10-CM

## 2024-05-10 ENCOUNTER — TELEPHONE (OUTPATIENT)
Dept: SPINE | Facility: CLINIC | Age: 54
End: 2024-05-10
Payer: COMMERCIAL

## 2024-05-10 NOTE — TELEPHONE ENCOUNTER
----- Message from Stacey M Lefort sent at 5/10/2024  8:36 AM CDT -----  Regarding: RE: new visit   Called pt to schedule no answer - also forwarded msg to Dr Melton's as requested  ----- Message -----  From: Fani Dunn LPN  Sent: 5/9/2024   2:37 PM CDT  To: Stacey M Lefort  Subject: FW: new visit                                    ----- Message -----  From: Sherwin López MA  Sent: 5/9/2024   2:29 PM CDT  To: [facility] Staff  Subject: new visit                                      We have a patient who has been approved for WC for her back, are you all able to get her scheduled? She works for [employer].    Good afternoon,     I am the assigned  to this case. The accepted body parts are her Right knee and back, she sustained a torn meniscus and bulging in her back. Please see attached and forward to Dr. Melton's office.     #Please note the address change below. Please send all invoices, documents etc. to our Physical Address: [address] [place] [address], [address] [place] [id number]        Thank you,     Fani Mohr Mccallum  Workers Comp   [employer]  [address] [place] [address]., [address]  [place] [address]  Toll Free: [phone] ext [phone]  Direct: [phone]  Fax: [phone]  [email or web address]

## 2024-07-10 ENCOUNTER — OFFICE VISIT (OUTPATIENT)
Dept: ORTHOPEDICS | Facility: CLINIC | Age: 54
End: 2024-07-10
Payer: COMMERCIAL

## 2024-07-10 VITALS
WEIGHT: 221.25 LBS | HEIGHT: 63 IN | BODY MASS INDEX: 39.2 KG/M2 | SYSTOLIC BLOOD PRESSURE: 167 MMHG | HEART RATE: 62 BPM | DIASTOLIC BLOOD PRESSURE: 80 MMHG

## 2024-07-10 DIAGNOSIS — M25.561 RIGHT KNEE PAIN, UNSPECIFIED CHRONICITY: Primary | ICD-10-CM

## 2024-07-10 DIAGNOSIS — A60.00 GENITAL HERPES SIMPLEX, UNSPECIFIED SITE: ICD-10-CM

## 2024-07-10 DIAGNOSIS — M17.11 PRIMARY OSTEOARTHRITIS OF RIGHT KNEE: ICD-10-CM

## 2024-07-10 PROCEDURE — 3077F SYST BP >= 140 MM HG: CPT | Mod: CPTII,S$GLB,, | Performed by: ORTHOPAEDIC SURGERY

## 2024-07-10 PROCEDURE — 99214 OFFICE O/P EST MOD 30 MIN: CPT | Mod: S$GLB,,, | Performed by: ORTHOPAEDIC SURGERY

## 2024-07-10 PROCEDURE — 1159F MED LIST DOCD IN RCRD: CPT | Mod: CPTII,S$GLB,, | Performed by: ORTHOPAEDIC SURGERY

## 2024-07-10 PROCEDURE — 99999 PR PBB SHADOW E&M-EST. PATIENT-LVL III: CPT | Mod: PBBFAC,,, | Performed by: ORTHOPAEDIC SURGERY

## 2024-07-10 PROCEDURE — 3079F DIAST BP 80-89 MM HG: CPT | Mod: CPTII,S$GLB,, | Performed by: ORTHOPAEDIC SURGERY

## 2024-07-10 PROCEDURE — 3008F BODY MASS INDEX DOCD: CPT | Mod: CPTII,S$GLB,, | Performed by: ORTHOPAEDIC SURGERY

## 2024-07-10 RX ORDER — VALACYCLOVIR HYDROCHLORIDE 1 G/1
1000 TABLET, FILM COATED ORAL EVERY 12 HOURS
Qty: 14 TABLET | Refills: 5 | Status: SHIPPED | OUTPATIENT
Start: 2024-07-10

## 2024-07-10 NOTE — PROGRESS NOTES
Patient ID: Pat Yung is a 53 y.o. female    Pain and Injury of the Right Knee (Injured 2024)      History of Present Illness:    aPt Yung presents to clinic for recurrent right knee effusions and pain.  She reports chronic history of swelling of the knee.  She suffered a fall while at work about 6 months ago which worsened her pain.  Today however she reports minimal pain in her knee.  Her only issues are when bending her knee and going up and down steps or squatting etc..  Denies any significant mechanical symptoms with extension or any subjective instability when walking.  Does report having her knee drained a long time ago as well as a what sounds to be intra-articular steroid injection which gave her about 2 weeks of relief.    PAST MEDICAL HISTORY:   Past Medical History:   Diagnosis Date    GERD (gastroesophageal reflux disease)     Herpes simplex without mention of complication     Hyperlipidemia     Hypertension     Ulcer     Vitamin D deficiency      PAST SURGICAL HISTORY:   Past Surgical History:   Procedure Laterality Date     SECTION  1998    x 1    COLONOSCOPY N/A 2021    Procedure: COLONOSCOPY;  Surgeon: Jesu Canas MD;  Location: [facility] ENDO;  Service: Endoscopy;  Laterality: N/A;  covid  stbernard -ml    ESOPHAGOGASTRODUODENOSCOPY  2022    ESOPHAGOGASTRODUODENOSCOPY N/A 2022    Procedure: EGD (ESOPHAGOGASTRODUODENOSCOPY);  Surgeon: Bhupendra Delacruz MD;  Location: [facility];  Service: Endoscopy;  Laterality: N/A;    TUBAL LIGATION       FAMILY HISTORY:   Family History   Problem Relation Name Age of Onset    Diabetes Maternal Grandmother      Heart disease Maternal Grandmother      Kidney disease Maternal Grandmother      Stroke Maternal Grandmother      Vision loss Maternal Grandmother      COPD Maternal Grandfather      Heart disease Maternal Grandfather      Hyperlipidemia Maternal Grandfather      Hypertension Maternal Grandfather    "   Stroke Maternal Grandfather      Vision loss Paternal Grandmother      Sarcoidosis Mother      Vitiligo Mother      Ovarian cancer Neg Hx      Colon cancer Neg Hx      Breast cancer Neg Hx       SOCIAL HISTORY:   Social History     Occupational History    Not on file   Tobacco Use    Smoking status: Never    Smokeless tobacco: Never   Substance and Sexual Activity    Alcohol use: Not Currently     Comment: occasionally    Drug use: No    Sexual activity: Yes     Partners: Male     Birth control/protection: Surgical        MEDICATIONS:   Current Outpatient Medications:     amLODIPine (NORVASC) 5 MG tablet, Take 1 tablet (5 mg total) by mouth once daily., Disp: 90 tablet, Rfl: 3    diclofenac sodium (VOLTAREN) 1 % Gel, Apply 2 g topically 4 (four) times daily., Disp: 100 g, Rfl: 5    valACYclovir (VALTREX) 1000 MG tablet, Take 1 tablet (1,000 mg total) by mouth every 12 (twelve) hours. Begin taking at first sign of outbreak, Disp: 14 tablet, Rfl: 5  ALLERGIES:   Review of patient's allergies indicates:   Allergen Reactions    Nexium [esomeprazole magnesium] Hives     Patient says that began having "break-outs" on her abdomen         Physical Exam     Vitals:    07/10/24 0859   BP: (!) 167/80   Pulse: 62     Alert and oriented to person, place and time. No acute distress. Well-groomed, not ill appearing. Pupils round and reactive, normal respiratory effort, no audible wheezing.     OBSERVATION / INSPECTION   Gait:   Nonantalgic   Alignment:  Neutral   Scars:   None   Muscle atrophy: None  Effusion:  Moderate   Warmth:  None   Discoloration:   None     TENDERNESS                 Patella     Negative    Peripatellar medial    +   Peripatellar lateral   +   Patellar tendon   Negative   Quad tendon     Negative    Prepatellar Bursa   Negative     Tibial tubercle    Negative    Pes anserine/HS   Negative      ITB     Negative      LCL     Negative  MFC     Negative  LFC     Negative  MCL      Negative  Medial Joint Line "    +   Lateral Joint Line   Negative  Quadriceps    Negative  Hamstring     Negative            Range of  Motion:        Left knee:   0-140°  Right knee:    0-90° *      Patellofemoral examination:     Patella position    Centered  Crepitus (PF):    Absent   Lateral tilt:    Normal   J-sign:     None   Patellofemoral grind:   No pain       MENISCAL SIGNS:     Pain on terminal extension:  +  Pain on terminal flexion:  +  Zarinas maneuver:  +    EXTREMITY NEURO-VASCULAR EXAMINATION:   Sensation:  Grossly intact to light touch all dermatomal regions.   Motor Function:  Fully intact motor function at hip, knee, foot and ankle    DTRs;  quadriceps and  achilles 2+.  No clonus and downgoing Babinski.    Vascular status:  DP and PT pulses 2+, brisk capillary refill, symmetric.     Imaging:     Bilateral knee X-rays ordered/reviewed by me showing no evidence of fracture or dislocation. There is no obvious malalignment. No evidence of masses, lesions or foreign bodies.  Mild degenerative changes, Kellgren Bryant grade 2.    MRI of the right knee reviewed from outside facility showing degenerative medial and lateral meniscus tears with patellofemoral arthritis    Assessment & Plan    Right knee pain, unspecified chronicity    Primary osteoarthritis of right knee         Treatment options were discussed with her in detail.  We discussed her x-ray and MRI findings consistent with patellofemoral arthritis and degenerative medial and lateral meniscus tears.  She does have recurrent effusions of the knee.  Today she is overall doing fairly well.  We discussed treatment options including nonoperative and operative options.  Non operatively we discussed steroid injections, weight loss and activity modification as well as physical therapy.  Operatively we discussed arthroscopy of the knee with debridement of the medial and lateral meniscus and chondroplasty of the patellofemoral joint.  We discussed what this means for her knee  long term.  At this time she is not interested in any surgical intervention nor injections or therapeutics.  She will follow up if her symptoms persist or worsen

## 2024-07-10 NOTE — TELEPHONE ENCOUNTER
No care due was identified.  [facility] Embedded Care Due Messages. Reference number: [id number].   7/10/2024 9:03:09 AM CDT

## 2024-09-19 ENCOUNTER — PATIENT MESSAGE (OUTPATIENT)
Dept: PRIMARY CARE CLINIC | Facility: CLINIC | Age: 54
End: 2024-09-19
Payer: COMMERCIAL

## 2024-10-24 DIAGNOSIS — Z12.31 OTHER SCREENING MAMMOGRAM: ICD-10-CM

## 2024-10-28 PROBLEM — R07.9 CHEST PAIN: Status: ACTIVE | Noted: 2024-10-28

## 2024-10-29 PROBLEM — R79.89 ELEVATED TROPONIN: Status: ACTIVE | Noted: 2024-10-29

## 2024-10-29 PROBLEM — R07.89 MUSCULOSKELETAL CHEST PAIN: Status: ACTIVE | Noted: 2024-10-28

## 2024-11-05 ENCOUNTER — OFFICE VISIT (OUTPATIENT)
Dept: PRIMARY CARE CLINIC | Facility: CLINIC | Age: 54
End: 2024-11-05
Payer: COMMERCIAL

## 2024-11-05 VITALS
HEIGHT: 63 IN | WEIGHT: 219.25 LBS | HEART RATE: 72 BPM | SYSTOLIC BLOOD PRESSURE: 120 MMHG | BODY MASS INDEX: 38.85 KG/M2 | DIASTOLIC BLOOD PRESSURE: 68 MMHG | RESPIRATION RATE: 18 BRPM | OXYGEN SATURATION: 97 %

## 2024-11-05 DIAGNOSIS — R07.9 CHEST PAIN, UNSPECIFIED TYPE: ICD-10-CM

## 2024-11-05 DIAGNOSIS — R79.89 ELEVATED TROPONIN: ICD-10-CM

## 2024-11-05 DIAGNOSIS — Z09 HOSPITAL DISCHARGE FOLLOW-UP: Primary | ICD-10-CM

## 2024-11-05 DIAGNOSIS — F43.0 ACUTE STRESS REACTION: ICD-10-CM

## 2024-11-05 PROCEDURE — 99214 OFFICE O/P EST MOD 30 MIN: CPT | Mod: S$GLB,,, | Performed by: STUDENT IN AN ORGANIZED HEALTH CARE EDUCATION/TRAINING PROGRAM

## 2024-11-05 PROCEDURE — 3008F BODY MASS INDEX DOCD: CPT | Mod: CPTII,S$GLB,, | Performed by: STUDENT IN AN ORGANIZED HEALTH CARE EDUCATION/TRAINING PROGRAM

## 2024-11-05 PROCEDURE — 99999 PR PBB SHADOW E&M-EST. PATIENT-LVL IV: CPT | Mod: PBBFAC,,, | Performed by: STUDENT IN AN ORGANIZED HEALTH CARE EDUCATION/TRAINING PROGRAM

## 2024-11-05 PROCEDURE — 3074F SYST BP LT 130 MM HG: CPT | Mod: CPTII,S$GLB,, | Performed by: STUDENT IN AN ORGANIZED HEALTH CARE EDUCATION/TRAINING PROGRAM

## 2024-11-05 PROCEDURE — 4010F ACE/ARB THERAPY RXD/TAKEN: CPT | Mod: CPTII,S$GLB,, | Performed by: STUDENT IN AN ORGANIZED HEALTH CARE EDUCATION/TRAINING PROGRAM

## 2024-11-05 PROCEDURE — 1159F MED LIST DOCD IN RCRD: CPT | Mod: CPTII,S$GLB,, | Performed by: STUDENT IN AN ORGANIZED HEALTH CARE EDUCATION/TRAINING PROGRAM

## 2024-11-05 PROCEDURE — 1160F RVW MEDS BY RX/DR IN RCRD: CPT | Mod: CPTII,S$GLB,, | Performed by: STUDENT IN AN ORGANIZED HEALTH CARE EDUCATION/TRAINING PROGRAM

## 2024-11-05 PROCEDURE — 3078F DIAST BP <80 MM HG: CPT | Mod: CPTII,S$GLB,, | Performed by: STUDENT IN AN ORGANIZED HEALTH CARE EDUCATION/TRAINING PROGRAM

## 2024-11-05 RX ORDER — OMEPRAZOLE 40 MG/1
40 CAPSULE, DELAYED RELEASE ORAL DAILY
Qty: 90 CAPSULE | Refills: 3 | Status: SHIPPED | OUTPATIENT
Start: 2024-11-05 | End: 2025-11-05

## 2024-11-05 NOTE — LETTER
November 5, 2024      [facility] [address]  [place] [address]  Phone: [phone]  Fax: [phone]       Patient: Pat Yung   YOB: 1970  Date of Visit: 11/05/2024    To Whom It May Concern:    Mart Yung  was at [facility] on 11/05/2024. The patient may return to work/school on 11/06/2024 with no restrictions. If you have any questions or concerns, or if I can be of further assistance, please do not hesitate to contact me.    Sincerely,    Marie Jamison MA

## 2024-11-05 NOTE — PROGRESS NOTES
Subjective:       Patient ID: Pat Yung is a 54 y.o. female.    Chief Complaint: Follow-up (Hospital f/u)      HPI:  54 y.o. female presents to [facility] for hospital follow-up visit    Patient was seen in ED 10/28/2024 with mid-sternal chest pain radiating to her shoulder and minimally elevated troponins. Pain was worse on exertion. EKG remarkable for possible anterior infarct age undetermined and unremarkable CXR. Cardiology evaluated while inpatient with recommended outpatient work-up and to begin taking lisinopril, aspirin, and statin. Echocardiogram revealed borderline pulmonary hypertension.    Since hospital discharge, patient reports has had some improvement in blurred vision, but is still present. States that she has occasional mild chest pain when waking that is only present when lying flat. Getting out of bed will alleviate. Feels like it could be gas as belching can help.    Has follow-up scheduled with Cardiology 12/6/2024.    Taking lisinopril and statin?: Yes    Feels symptoms could be related to some recent stressors.    Recent stressors?: Family relationships and fincances  Exercise?: None currently  Meditation?: Hasn't tried  Good local support?: Yes  Therapist?: Yes  Treated in past?: Never  Failed trials?: N/A  Manic symptoms when explained?: None    Review of Systems   Constitutional:  Negative for chills, diaphoresis, fatigue and fever.   Respiratory:  Positive for chest tightness (Can at times). Negative for shortness of breath.    Cardiovascular:  Positive for chest pain. Negative for palpitations (Can hear in ear at times).   Gastrointestinal:  Negative for abdominal pain, diarrhea, nausea and vomiting.        Belching will alleviate chest pain   Skin:  Negative for rash and wound.   Neurological:  Positive for headaches (Occasional intermittent, mild). Negative for dizziness and light-headedness.       Objective:      Vitals:    11/05/24 0941   BP: 120/68   BP Location: Right arm  "  Patient Position: Sitting   Pulse: 72   Resp: 18   SpO2: 97%   Weight: 99.5 kg (219 lb 4 oz)   Height: 5' 3" (1.6 m)     Physical Exam  Vitals reviewed.   Constitutional:       General: She is not in acute distress.     Appearance: Normal appearance. She is not ill-appearing.   HENT:      Head: Normocephalic and atraumatic.   Eyes:      General:         Right eye: No discharge.         Left eye: No discharge.      Conjunctiva/sclera: Conjunctivae normal.   Cardiovascular:      Rate and Rhythm: Normal rate and regular rhythm.      Pulses: Normal pulses.      Heart sounds: No murmur heard.  Pulmonary:      Effort: Pulmonary effort is normal.      Breath sounds: Normal breath sounds.   Musculoskeletal:         General: No deformity.      Cervical back: Neck supple. No rigidity.   Lymphadenopathy:      Cervical: No cervical adenopathy.   Skin:     General: Skin is warm and dry.      Coloration: Skin is not jaundiced.   Neurological:      General: No focal deficit present.      Mental Status: She is alert and oriented to person, place, and time.   Psychiatric:         Mood and Affect: Mood normal.         Behavior: Behavior normal.             Lab Results   Component Value Date     10/29/2024    K 4.7 10/29/2024     10/29/2024    CO2 22 (L) 10/29/2024    BUN 12 10/29/2024    CREATININE 0.7 10/29/2024    ANIONGAP 12 10/29/2024     Lab Results   Component Value Date    HGBA1C 5.4 05/11/2022     Lab Results   Component Value Date    BNP 14 10/28/2024    BNP <10 01/14/2024       Lab Results   Component Value Date    WBC 6.76 10/29/2024    HGB 13.1 10/29/2024    HCT 40.3 10/29/2024     10/29/2024    GRAN 3.5 10/29/2024    GRAN 52.5 10/29/2024     Lab Results   Component Value Date    CHOL 251 (H) 10/29/2024    HDL 57 10/29/2024    LDLCALC 167.6 (H) 10/29/2024    TRIG 132 10/29/2024          Current Outpatient Medications:     amLODIPine (NORVASC) 5 MG tablet, Take 1 tablet (5 mg total) by mouth once " daily., Disp: 90 tablet, Rfl: 3    aspirin (ECOTRIN) 81 MG EC tablet, Take 1 tablet (81 mg total) by mouth once daily., Disp: 90 tablet, Rfl: 3    atorvastatin (LIPITOR) 40 MG tablet, Take 1 tablet (40 mg total) by mouth every evening., Disp: 90 tablet, Rfl: 3    lisinopriL 10 MG tablet, Take 1 tablet (10 mg total) by mouth once daily., Disp: 90 tablet, Rfl: 3    valACYclovir (VALTREX) 1000 MG tablet, Take 1 tablet (1,000 mg total) by mouth every 12 (twelve) hours. Begin taking at first sign of outbreak, Disp: 14 tablet, Rfl: 5    omeprazole (PRILOSEC) 40 MG capsule, Take 1 capsule (40 mg total) by mouth once daily., Disp: 90 capsule, Rfl: 3        Assessment:       1. Hospital discharge follow-up    2. Chest pain, unspecified type    3. Elevated troponin    4. Acute stress reaction           Plan:       Hospital discharge follow-up  Chest pain, unspecified type  Elevated troponin  Acute stress reaction  -     Ambulatory referral/consult to Psychology; Future; Expected date: 11/12/2024  -     omeprazole (PRILOSEC) 40 MG capsule; Take 1 capsule (40 mg total) by mouth once daily.  Dispense: 90 capsule; Refill: 3  - Conservative measures discussed today. Recommend routine exercise, daily meditation, and breathing exercises.  - Keep follow-up with Cardiology for ongoing chest pains  - If change in chest pain quality, duration, or feeling nausea/sweats instructed to re-present to ED    RTC in 2 months

## 2024-11-08 ENCOUNTER — TELEPHONE (OUTPATIENT)
Dept: PRIMARY CARE CLINIC | Facility: CLINIC | Age: 54
End: 2024-11-08
Payer: COMMERCIAL

## 2024-11-08 DIAGNOSIS — I10 PRIMARY HYPERTENSION: Primary | ICD-10-CM

## 2024-11-08 NOTE — TELEPHONE ENCOUNTER
Patient states she has been taking Lisinopril for 2 weeks and she is dry cough, blurry vision and frequency with urination. She will need to stop medication and would like another RX for something different.

## 2024-11-08 NOTE — TELEPHONE ENCOUNTER
----- Message from Judith sent at 11/8/2024 10:44 AM CST -----  Contact: [phone] Patient  .[email or web address]     Patient is calling for Medical Advice regarding: headaches, urinating on self from coughing so much, blurry vision, dry cough all side effects from her lisinopriL 10 MG tablet    How long has patient had these symptoms: cough since she started the medication , the other stuff for 3 days    Pharmacy name and phone#:   [id number] - [place] - [address] AT [facility] [place] [address]  Phone: [phone] Fax: [phone]        Patient wants a call back or thru [facility]: Call Back please. Thank you. Pt said she can not go another day like this.     Comments:    Please advise patient replies from provider may take up to 48 hours.

## 2024-11-11 RX ORDER — VALSARTAN 80 MG/1
80 TABLET ORAL DAILY
Qty: 90 TABLET | Refills: 3 | Status: SHIPPED | OUTPATIENT
Start: 2024-11-11 | End: 2025-11-11

## 2024-11-11 NOTE — TELEPHONE ENCOUNTER
Stopped Lisinopril due to side effects reported to staff. Pt mychart message sent to stop medication and  new script.

## 2024-11-12 ENCOUNTER — HOSPITAL ENCOUNTER (OUTPATIENT)
Dept: RADIOLOGY | Facility: HOSPITAL | Age: 54
Discharge: HOME | End: 2024-11-12
Attending: STUDENT IN AN ORGANIZED HEALTH CARE EDUCATION/TRAINING PROGRAM
Payer: COMMERCIAL

## 2024-11-12 DIAGNOSIS — Z12.31 OTHER SCREENING MAMMOGRAM: ICD-10-CM

## 2024-11-12 PROCEDURE — 77067 SCR MAMMO BI INCL CAD: CPT | Mod: TC

## 2024-11-14 ENCOUNTER — TELEPHONE (OUTPATIENT)
Dept: ADMINISTRATIVE | Facility: CLINIC | Age: 54
End: 2024-11-14
Payer: COMMERCIAL

## 2024-11-14 NOTE — PROGRESS NOTES
Call returned to Pt to f/u from her recent ED visit. Message sent to PCP to reach out to Pt to assist with c/o swelling to eyelid and beneath the the eye. As well as her nose is running. Pt encouraged to reach out with any concerns at they arise.

## 2024-11-21 DIAGNOSIS — A60.00 GENITAL HERPES SIMPLEX, UNSPECIFIED SITE: ICD-10-CM

## 2024-11-26 ENCOUNTER — OFFICE VISIT (OUTPATIENT)
Dept: CARDIOLOGY | Facility: CLINIC | Age: 54
End: 2024-11-26
Payer: COMMERCIAL

## 2024-11-26 VITALS
HEART RATE: 72 BPM | OXYGEN SATURATION: 99 % | SYSTOLIC BLOOD PRESSURE: 132 MMHG | DIASTOLIC BLOOD PRESSURE: 84 MMHG | BODY MASS INDEX: 38.88 KG/M2 | HEIGHT: 63 IN | WEIGHT: 219.44 LBS

## 2024-11-26 DIAGNOSIS — R07.89 ATYPICAL CHEST PAIN: ICD-10-CM

## 2024-11-26 DIAGNOSIS — K21.9 GASTROESOPHAGEAL REFLUX DISEASE, UNSPECIFIED WHETHER ESOPHAGITIS PRESENT: Primary | ICD-10-CM

## 2024-11-26 DIAGNOSIS — R79.89 ELEVATED TROPONIN: ICD-10-CM

## 2024-11-26 DIAGNOSIS — I10 PRIMARY HYPERTENSION: ICD-10-CM

## 2024-11-26 PROCEDURE — 99999 PR PBB SHADOW E&M-EST. PATIENT-LVL IV: CPT | Mod: PBBFAC,,,

## 2024-11-26 NOTE — PROGRESS NOTES
[facility] - Cardiology [address]  Cardiology Clinic Note      Chief Complaint  Chief Complaint   Patient presents with    Hospital Follow Up       HPI:  Ms. Yung is a 54-year-old female with a past medical history of hypertension, hyperlipidemia, vitamin-D deficiency, GERD, peptic ulcer disease    Patient is new to me here in clinic  I had the pleasure of seeing her during her most recent hospital admission in which she was admitted for chest discomfort and hypertensive urgency  She was discharged with amlodipine and valsartan blood pressure good today  During this admission she had complaints of epigastric/mid chest discomfort that she describes to be sharp and reproducible  She does attributes epigastric and mid chest pain to reflux which she takes omeprazole as simethicone as needed for relief  She also complains of intermittent chest discomfort and dyspnea on exertion  States she can now walk up a flight of steps without being exerted  She reports today that she has maintained a healthy diet in which she consumes mostly vegetables and fruit and minimizes salt intake  She lives at home with significant other  She is independent in without any mobility deficits  Today she has no complaints and is doing well with all medications    EKG normal sinus rhythm with nonspecific T-wave abnormalities      Medications  Current Outpatient Medications   Medication Sig Dispense Refill    amLODIPine (NORVASC) 5 MG tablet Take 1 tablet (5 mg total) by mouth once daily. 90 tablet 3    aspirin (ECOTRIN) 81 MG EC tablet Take 1 tablet (81 mg total) by mouth once daily. 90 tablet 3    atorvastatin (LIPITOR) 40 MG tablet Take 1 tablet (40 mg total) by mouth every evening. 90 tablet 3    ibuprofen (ADVIL,MOTRIN) 600 MG tablet Take 1 tablet (600 mg total) by mouth every 6 (six) hours as needed for Pain. 20 tablet 0    omeprazole (PRILOSEC) 40 MG capsule Take 1 capsule (40 mg total) by mouth once daily. 90 capsule 3    valACYclovir  (VALTREX) 1000 MG tablet Take 1 tablet (1,000 mg total) by mouth every 12 (twelve) hours. Begin taking at first sign of outbreak 14 tablet 5    valsartan (DIOVAN) 80 MG tablet Take 1 tablet (80 mg total) by mouth once daily. 90 tablet 3     No current facility-administered medications for this visit.        History  Past Medical History:   Diagnosis Date    GERD (gastroesophageal reflux disease)     Herpes simplex without mention of complication     Hyperlipidemia     Hypertension     Ulcer     Vitamin D deficiency      Past Surgical History:   Procedure Laterality Date     SECTION  1998    x 1    COLONOSCOPY N/A 2021    Procedure: COLONOSCOPY;  Surgeon: Jesu Canas MD;  Location: [facility] ENDO;  Service: Endoscopy;  Laterality: N/A;  covid  stbernard -ml    ESOPHAGOGASTRODUODENOSCOPY  2022    ESOPHAGOGASTRODUODENOSCOPY N/A 2022    Procedure: EGD (ESOPHAGOGASTRODUODENOSCOPY);  Surgeon: Bhupendra Delacruz MD;  Location: [facility] ENDO;  Service: Endoscopy;  Laterality: N/A;    TUBAL LIGATION       Social History     Socioeconomic History    Marital status:    Tobacco Use    Smoking status: Never    Smokeless tobacco: Never   Substance and Sexual Activity    Alcohol use: Not Currently     Comment: occasionally    Drug use: No    Sexual activity: Yes     Partners: Male     Birth control/protection: Surgical     Social Drivers of Health     Financial Resource Strain: Patient Declined (10/28/2024)    Overall Financial Resource Strain (CARDIA)     Difficulty of Paying Living Expenses: Patient declined   Food Insecurity: Patient Declined (10/28/2024)    Hunger Vital Sign     Worried About Running Out of Food in the Last Year: Patient declined     Ran Out of Food in the Last Year: Patient declined   Transportation Needs: Patient Declined (10/28/2024)    TRANSPORTATION NEEDS     Transportation : Patient declined   Physical Activity: Insufficiently Active (2020)    Exercise Vital  "Sign     Days of Exercise per Week: 5 days     Minutes of Exercise per Session: 10 min   Stress: Patient Declined (10/28/2024)    [employer] [place] [employer] - Occupational Stress Questionnaire     Feeling of Stress : Patient declined   Housing Stability: Patient Declined (10/28/2024)    Housing Stability Vital Sign     Unable to Pay for Housing in the Last Year: Patient declined     Homeless in the Last Year: Patient declined     Family History   Problem Relation Name Age of Onset    Diabetes Maternal Grandmother      Heart disease Maternal Grandmother      Kidney disease Maternal Grandmother      Stroke Maternal Grandmother      Vision loss Maternal Grandmother      COPD Maternal Grandfather      Heart disease Maternal Grandfather      Hyperlipidemia Maternal Grandfather      Hypertension Maternal Grandfather      Stroke Maternal Grandfather      Vision loss Paternal Grandmother      Sarcoidosis Mother      Vitiligo Mother      Ovarian cancer Neg Hx      Colon cancer Neg Hx      Breast cancer Neg Hx          Allergies  Review of patient's allergies indicates:   Allergen Reactions    Nexium [esomeprazole magnesium] Hives     Patient says that began having "break-outs" on her abdomen    Lisinopril Other (See Comments)     Cough        Review of Systems   Review of Systems   Constitutional: Negative for chills, decreased appetite, diaphoresis, fever, malaise/fatigue, weight gain and weight loss.   Eyes:  Negative for blurred vision.   Cardiovascular:  Positive for dyspnea on exertion. Negative for chest pain, claudication, irregular heartbeat, leg swelling, near-syncope, orthopnea, palpitations, paroxysmal nocturnal dyspnea and syncope.        Complains of epigastric/mid chest discomfort   Respiratory:  Negative for cough, shortness of breath, snoring, sputum production and wheezing.    Endocrine: Negative for cold intolerance, heat intolerance, polydipsia, polyphagia and polyuria.   Skin:  Negative for " color change, dry skin, itching, nail changes and poor wound healing.   Musculoskeletal:  Negative for back pain, gout, joint pain and joint swelling.   Gastrointestinal:  Negative for bloating, abdominal pain, constipation, diarrhea, hematemesis, hematochezia, melena, nausea and vomiting.   Genitourinary:  Negative for dysuria and hematuria.   Neurological:  Negative for dizziness, headaches, light-headedness, numbness, paresthesias and weakness.   Psychiatric/Behavioral:  Negative for altered mental status, depression and memory loss.        Physical Exam  Vitals:    11/26/24 1304   BP: 132/84   Pulse: 72     Wt Readings from Last 1 Encounters:   11/26/24 99.6 kg (219 lb 7.5 oz)     Physical Exam  Constitutional:       General: She is not in acute distress.  HENT:      Head: Normocephalic and atraumatic.      Mouth/Throat:      Mouth: Mucous membranes are moist.   Eyes:      Extraocular Movements: Extraocular movements intact.      Pupils: Pupils are equal, round, and reactive to light.   Neck:      Vascular: No carotid bruit or JVD.   Cardiovascular:      Rate and Rhythm: Normal rate and regular rhythm.      Heart sounds: No murmur heard.     No friction rub. No gallop.   Pulmonary:      Effort: Pulmonary effort is normal.      Breath sounds: Normal breath sounds.   Abdominal:      General: Abdomen is flat.      Palpations: Abdomen is soft.   Musculoskeletal:      Right lower leg: No edema.      Left lower leg: No edema.   Skin:     General: Skin is warm.      Capillary Refill: Capillary refill takes less than 2 seconds.   Neurological:      General: No focal deficit present.   Psychiatric:         Mood and Affect: Mood normal.         Labs  Admission on 11/12/2024, Discharged on 11/12/2024   Component Date Value Ref Range Status    QRS Duration 11/12/2024 76  ms Final    OHS QTC Calculation 11/12/2024 450  ms Final    WBC 11/12/2024 6.82  3.90 - 12.70 K/uL Final    RBC 11/12/2024 4.46  4.00 - 5.40 M/uL Final     Hemoglobin 11/12/2024 13.4  12.0 - 16.0 g/dL Final    Hematocrit 11/12/2024 40.2  37.0 - 48.5 % Final    MCV 11/12/2024 90  82 - 98 fL Final    MCH 11/12/2024 30.0  27.0 - 31.0 pg Final    MCHC 11/12/2024 33.3  32.0 - 36.0 g/dL Final    RDW 11/12/2024 13.3  11.5 - 14.5 % Final    Platelets 11/12/2024 289  150 - 450 K/uL Final    MPV 11/12/2024 10.0  9.2 - 12.9 fL Final    Immature Granulocytes 11/12/2024 0.3  0.0 - 0.5 % Final    Gran # (ANC) 11/12/2024 3.4  1.8 - 7.7 K/uL Final    Immature Grans (Abs) 11/12/2024 0.02  0.00 - 0.04 K/uL Final    Comment: Mild elevation in immature granulocytes is non specific and   can be seen in a variety of conditions including stress response,   acute inflammation, trauma and pregnancy. Correlation with other   laboratory and clinical findings is essential.      Lymph # 11/12/2024 2.6  1.0 - 4.8 K/uL Final    Mono # 11/12/2024 0.6  0.3 - 1.0 K/uL Final    Eos # 11/12/2024 0.1  0.0 - 0.5 K/uL Final    Baso # 11/12/2024 0.07  0.00 - 0.20 K/uL Final    nRBC 11/12/2024 0  0 /100 WBC Final    Gran % 11/12/2024 50.4  38.0 - 73.0 % Final    Lymph % 11/12/2024 38.3  18.0 - 48.0 % Final    Mono % 11/12/2024 8.1  4.0 - 15.0 % Final    Eosinophil % 11/12/2024 1.9  0.0 - 8.0 % Final    Basophil % 11/12/2024 1.0  0.0 - 1.9 % Final    Differential Method 11/12/2024 Automated   Final    Sodium 11/12/2024 138  136 - 145 mmol/L Final    Potassium 11/12/2024 3.7  3.5 - 5.1 mmol/L Final    Chloride 11/12/2024 104  95 - 110 mmol/L Final    CO2 11/12/2024 24  23 - 29 mmol/L Final    Glucose 11/12/2024 100  70 - 110 mg/dL Final    BUN 11/12/2024 9  6 - 20 mg/dL Final    Creatinine 11/12/2024 0.7  0.5 - 1.4 mg/dL Final    Calcium 11/12/2024 9.3  8.7 - 10.5 mg/dL Final    Total Protein 11/12/2024 8.0  6.0 - 8.4 g/dL Final    Albumin 11/12/2024 3.9  3.5 - 5.2 g/dL Final    Total Bilirubin 11/12/2024 0.3  0.1 - 1.0 mg/dL Final    Comment: For infants and newborns, interpretation of results should be  based  on gestational age, weight and in agreement with clinical  observations.    Premature Infant recommended reference ranges:  Up to 24 hours.............<8.0 mg/dL  Up to 48 hours............<12.0 mg/dL  3-5 days..................<15.0 mg/dL  6-29 days.................<15.0 mg/dL      Alkaline Phosphatase 11/12/2024 87  40 - 150 U/L Final    AST 11/12/2024 19  10 - 40 U/L Final    ALT 11/12/2024 20  10 - 44 U/L Final    eGFR 11/12/2024 >60.0  >60 mL/min/1.73 m^2 Final    Anion Gap 11/12/2024 10  8 - 16 mmol/L Final    Prothrombin Time 11/12/2024 10.8  9.0 - 12.5 sec Final    INR 11/12/2024 1.0  0.8 - 1.2 Final    Comment: Coumadin Therapy:  2.0 - 3.0 for INR for all indicators except mechanical heart valves  and antiphospholipid syndromes which should use 2.5 - 3.5.  LOT^040^PT Inn^[id number]      aPTT 11/12/2024 31.0  21.0 - 32.0 sec Final    Comment: Refer to local heparin nomogram for intensity/dose specific   therapeutic   range.  LOT^050^APTT FSL^[id number]      POCT Glucose 11/12/2024 97  70 - 110 mg/dL Final   Admission on 10/28/2024, Discharged on 10/29/2024   Component Date Value Ref Range Status    QRS Duration 10/28/2024 78  ms Final    OHS QTC Calculation 10/28/2024 457  ms Final    WBC 10/28/2024 7.69  3.90 - 12.70 K/uL Final    RBC 10/28/2024 4.44  4.00 - 5.40 M/uL Final    Hemoglobin 10/28/2024 13.3  12.0 - 16.0 g/dL Final    Hematocrit 10/28/2024 41.0  37.0 - 48.5 % Final    MCV 10/28/2024 92  82 - 98 fL Final    MCH 10/28/2024 30.0  27.0 - 31.0 pg Final    MCHC 10/28/2024 32.4  32.0 - 36.0 g/dL Final    RDW 10/28/2024 14.0  11.5 - 14.5 % Final    Platelets 10/28/2024 251  150 - 450 K/uL Final    MPV 10/28/2024 9.9  9.2 - 12.9 fL Final    Immature Granulocytes 10/28/2024 0.1  0.0 - 0.5 % Final    Gran # (ANC) 10/28/2024 3.5  1.8 - 7.7 K/uL Final    Immature Grans (Abs) 10/28/2024 0.01  0.00 - 0.04 K/uL Final    Comment: Mild elevation in immature granulocytes is non specific and   can be seen in a  variety of conditions including stress response,   acute inflammation, trauma and pregnancy. Correlation with other   laboratory and clinical findings is essential.      Lymph # 10/28/2024 3.1  1.0 - 4.8 K/uL Final    Mono # 10/28/2024 0.6  0.3 - 1.0 K/uL Final    Eos # 10/28/2024 0.3  0.0 - 0.5 K/uL Final    Baso # 10/28/2024 0.09  0.00 - 0.20 K/uL Final    nRBC 10/28/2024 0  0 /100 WBC Final    Gran % 10/28/2024 45.8  38.0 - 73.0 % Final    Lymph % 10/28/2024 40.8  18.0 - 48.0 % Final    Mono % 10/28/2024 7.8  4.0 - 15.0 % Final    Eosinophil % 10/28/2024 4.3  0.0 - 8.0 % Final    Basophil % 10/28/2024 1.2  0.0 - 1.9 % Final    Platelet Estimate 10/28/2024 Clumped (A)   Final    Differential Method 10/28/2024 Automated   Final    Sodium 10/28/2024 139  136 - 145 mmol/L Final    Potassium 10/28/2024 3.7  3.5 - 5.1 mmol/L Final    Chloride 10/28/2024 104  95 - 110 mmol/L Final    CO2 10/28/2024 25  23 - 29 mmol/L Final    Glucose 10/28/2024 88  70 - 110 mg/dL Final    BUN 10/28/2024 13  6 - 20 mg/dL Final    Creatinine 10/28/2024 0.7  0.5 - 1.4 mg/dL Final    Calcium 10/28/2024 9.5  8.7 - 10.5 mg/dL Final    Total Protein 10/28/2024 7.9  6.0 - 8.4 g/dL Final    Albumin 10/28/2024 3.8  3.5 - 5.2 g/dL Final    Total Bilirubin 10/28/2024 0.2  0.1 - 1.0 mg/dL Final    Comment: For infants and newborns, interpretation of results should be based  on gestational age, weight and in agreement with clinical  observations.    Premature Infant recommended reference ranges:  Up to 24 hours.............<8.0 mg/dL  Up to 48 hours............<12.0 mg/dL  3-5 days..................<15.0 mg/dL  6-29 days.................<15.0 mg/dL      Alkaline Phosphatase 10/28/2024 82  40 - 150 U/L Final    AST 10/28/2024 16  10 - 40 U/L Final    ALT 10/28/2024 17  10 - 44 U/L Final    eGFR 10/28/2024 >60.0  >60 mL/min/1.73 m^2 Final    Anion Gap 10/28/2024 10  8 - 16 mmol/L Final    Troponin I 10/28/2024 0.018  0.000 - 0.026 ng/mL Final     Comment: The reference interval for Troponin I represents the 99th percentile   cutoff   for our facility and is consistent with 3rd generation assay   performance.      BNP 10/28/2024 14  0 - 99 pg/mL Final    Values of less than 100 pg/ml are consistent with non-CHF populations.    POC Rapid COVID 10/28/2024 Negative  Negative Final     Acceptable 10/28/2024 Yes   Final    POC Molecular Influenza A Ag 10/28/2024 Negative  Negative Final    POC Molecular Influenza B Ag 10/28/2024 Negative  Negative Final     Acceptable 10/28/2024 Yes   Final    Troponin I 10/28/2024 0.027 (H)  0.000 - 0.026 ng/mL Final    Comment: The reference interval for Troponin I represents the 99th percentile   cutoff   for our facility and is consistent with 3rd generation assay   performance.      BSA 10/29/2024 2.12  m2 Final    LVOT stroke volume 10/29/2024 47.8  cm3 Final    LVIDd 10/29/2024 4.8  3.5 - 6.0 cm Final    LV Systolic Volume 10/29/2024 46.58  mL Final    LV Systolic Volume Index 10/29/2024 23.1  mL/m2 Final    LVIDs 10/29/2024 3.4  2.1 - 4.0 cm Final    LV Diastolic Volume 10/29/2024 108.34  mL Final    LV Diastolic Volume Index 10/29/2024 53.63  mL/m2 Final    Left Ventricular End Systolic Volu* 10/29/2024 46.58  mL Final    Left Ventricular End Diastolic Vol* 10/29/2024 108.34  mL Final    IVS 10/29/2024 1.0  0.6 - 1.1 cm Final    LVOT diameter 10/29/2024 1.8  cm Final    LVOT area 10/29/2024 2.5  cm2 Final    FS 10/29/2024 29.2  28 - 44 % Final    Left Ventricle Relative Wall Thick* 10/29/2024 0.46  cm Final    PW 10/29/2024 1.1  0.6 - 1.1 cm Final    LV mass 10/29/2024 181.9  g Final    LV Mass Index 10/29/2024 90.1  g/m2 Final    MV Peak E Chema 10/29/2024 1.11  m/s Final    TDI LATERAL 10/29/2024 0.08  m/s Final    TDI SEPTAL 10/29/2024 0.08  m/s Final    E/E' ratio 10/29/2024 13.88  m/s Final    MV Peak A Chema 10/29/2024 1.11  m/s Final    TR Max Chema 10/29/2024 2.80  m/s Final    E/A ratio  10/29/2024 1.00   Final    IVRT 10/29/2024 77.07  msec Final    E wave deceleration time 10/29/2024 232.04  msec Final    LV SEPTAL E/E' RATIO 10/29/2024 13.88  m/s Final    LV LATERAL E/E' RATIO 10/29/2024 13.88  m/s Final    PV Peak S Chema 10/29/2024 0.63  m/s Final    PV Peak D Chema 10/29/2024 0.54  m/s Final    Pulm vein S/D ratio 10/29/2024 1.17   Final    LVOT peak chema 10/29/2024 0.7  m/s Final    Left Ventricular Outflow Tract Joi* 10/29/2024 0.51  cm/s Final    Left Ventricular Outflow Tract Joi* 10/29/2024 1.13  mmHg Final    LA size 10/29/2024 3.47  cm Final    Left Atrium Minor Axis 10/29/2024 5.31  cm Final    Left Atrium Major Axis 10/29/2024 5.40  cm Final    RA Major Axis 10/29/2024 5.29  cm Final    AV mean gradient 10/29/2024 5.1  mmHg Final    AV peak gradient 10/29/2024 7.8  mmHg Final    Ao peak chema 10/29/2024 1.4  m/s Final    Ao VTI 10/29/2024 31.1  cm Final    LVOT peak VTI 10/29/2024 18.8  cm Final    AV valve area 10/29/2024 1.5  cm² Final    AV Velocity Ratio 10/29/2024 0.50   Final    AV index (prosthetic) 10/29/2024 0.60   Final    SANIA by Velocity Ratio 10/29/2024 1.3  cm² Final    Mr max chema 10/29/2024 5.98  m/s Final    MV mean gradient 10/29/2024 3  mmHg Final    MV peak gradient 10/29/2024 6  mmHg Final    MV stenosis pressure 1/2 time 10/29/2024 66.65  ms Final    MV valve area p 1/2 method 10/29/2024 3.30  cm2 Final    MV valve area by continuity eq 10/29/2024 1.07  cm2 Final    MV VTI 10/29/2024 44.7  cm Final    Triscuspid Valve Regurgitation Pea* 10/29/2024 31  mmHg Final    PV PEAK VELOCITY 10/29/2024 1.01  m/s Final    PV peak gradient 10/29/2024 4  mmHg Final    Pulmonary Valve Mean Velocity 10/29/2024 0.72  m/s Final    Ao root annulus 10/29/2024 2.59  cm Final    STJ 10/29/2024 2.28  cm Final    Ascending aorta 10/29/2024 2.52  cm Final    IVC diameter 10/29/2024 1.59  cm Final    Mean e' 10/29/2024 0.08  m/s Final    ZLVIDS 10/29/2024 -0.56   Final    ZLVIDD 10/29/2024 -2.15    Final    RVDD 10/29/2024 3.89  cm Final    AORTIC VALVE CUSP SEPERATION 10/29/2024 1.59  cm Final    JAZZMINE 10/29/2024 25.8  mL/m2 Final    LA Vol 10/29/2024 52.12  cm3 Final    LA Vol (MOD) 10/29/2024 48.00  mL Final    LA WIDTH 10/29/2024 3.3  cm Final    JAZZMINE (MOD) 10/29/2024 23.8  mL/m2 Final    RA Width 10/29/2024 3.0  cm Final    TV resting pulmonary artery pressu* 10/29/2024 34  mmHg Final    RV TB RVSP 10/29/2024 6  mmHg Final    Est. RA pres 10/29/2024 3  mmHg Final    EF 10/29/2024 60  % Final    Troponin I 10/29/2024 0.036 (H)  0.000 - 0.026 ng/mL Final    Comment: The reference interval for Troponin I represents the 99th percentile   cutoff   for our facility and is consistent with 3rd generation assay   performance.      Sodium 10/29/2024 140  136 - 145 mmol/L Final    Potassium 10/29/2024 4.7  3.5 - 5.1 mmol/L Final    Chloride 10/29/2024 106  95 - 110 mmol/L Final    CO2 10/29/2024 22 (L)  23 - 29 mmol/L Final    Glucose 10/29/2024 99  70 - 110 mg/dL Final    BUN 10/29/2024 12  6 - 20 mg/dL Final    Creatinine 10/29/2024 0.7  0.5 - 1.4 mg/dL Final    Calcium 10/29/2024 8.9  8.7 - 10.5 mg/dL Final    Anion Gap 10/29/2024 12  8 - 16 mmol/L Final    eGFR 10/29/2024 >60.0  >60 mL/min/1.73 m^2 Final    WBC 10/29/2024 6.76  3.90 - 12.70 K/uL Final    RBC 10/29/2024 4.41  4.00 - 5.40 M/uL Final    Hemoglobin 10/29/2024 13.1  12.0 - 16.0 g/dL Final    Hematocrit 10/29/2024 40.3  37.0 - 48.5 % Final    MCV 10/29/2024 91  82 - 98 fL Final    MCH 10/29/2024 29.7  27.0 - 31.0 pg Final    MCHC 10/29/2024 32.5  32.0 - 36.0 g/dL Final    RDW 10/29/2024 13.8  11.5 - 14.5 % Final    Platelets 10/29/2024 297  150 - 450 K/uL Final    MPV 10/29/2024 9.8  9.2 - 12.9 fL Final    Immature Granulocytes 10/29/2024 0.1  0.0 - 0.5 % Final    Gran # (ANC) 10/29/2024 3.5  1.8 - 7.7 K/uL Final    Immature Grans (Abs) 10/29/2024 0.01  0.00 - 0.04 K/uL Final    Comment: Mild elevation in immature granulocytes is non specific and    can be seen in a variety of conditions including stress response,   acute inflammation, trauma and pregnancy. Correlation with other   laboratory and clinical findings is essential.      Lymph # 10/29/2024 2.4  1.0 - 4.8 K/uL Final    Mono # 10/29/2024 0.5  0.3 - 1.0 K/uL Final    Eos # 10/29/2024 0.3  0.0 - 0.5 K/uL Final    Baso # 10/29/2024 0.07  0.00 - 0.20 K/uL Final    nRBC 10/29/2024 0  0 /100 WBC Final    Gran % 10/29/2024 52.5  38.0 - 73.0 % Final    Lymph % 10/29/2024 35.2  18.0 - 48.0 % Final    Mono % 10/29/2024 7.5  4.0 - 15.0 % Final    Eosinophil % 10/29/2024 3.7  0.0 - 8.0 % Final    Basophil % 10/29/2024 1.0  0.0 - 1.9 % Final    Differential Method 10/29/2024 Automated   Final    Cholesterol 10/29/2024 251 (H)  120 - 199 mg/dL Final    Comment: The National Cholesterol Education Program (NCEP) has set the  following guidelines (reference ranges) for Cholesterol:  Optimal.....................<200 mg/dL  Borderline High.............200-239 mg/dL  High........................> or = 240 mg/dL      Triglycerides 10/29/2024 132  30 - 150 mg/dL Final    Comment: The National Cholesterol Education Program (NCEP) has set the  following guidelines (reference values) for triglycerides:  Normal......................<150 mg/dL  Borderline High.............150-199 mg/dL  High........................200-499 mg/dL      HDL 10/29/2024 57  40 - 75 mg/dL Final    Comment: The National Cholesterol Education Program (NCEP) has set the  following guidelines (reference values) for HDL Cholesterol:  Low...............<40 mg/dL  Optimal...........>60 mg/dL      LDL Cholesterol 10/29/2024 167.6 (H)  63.0 - 159.0 mg/dL Final    Comment: The National Cholesterol Education Program (NCEP) has set the  following guidelines (reference values) for LDL Cholesterol:  Optimal.......................<130 mg/dL  Borderline High...............130-159 mg/dL  High..........................160-189 mg/dL  Very High.....................>190  mg/dL      HDL/Cholesterol Ratio 10/29/2024 22.7  20.0 - 50.0 % Final    Total Cholesterol/HDL Ratio 10/29/2024 4.4  2.0 - 5.0 Final    Non-HDL Cholesterol 10/29/2024 194  mg/dL Final    Comment: Risk category and Non-HDL cholesterol goals:  Coronary heart disease (CHD)or equivalent (10-year risk of CHD >20%):  Non-HDL cholesterol goal     <130 mg/dL  Two or more CHD risk factors and 10-year risk of CHD <= 20%:  Non-HDL cholesterol goal     <160 mg/dL  0 to 1 CHD risk factor:  Non-HDL cholesterol goal     <190 mg/dL         EKG  Reviewed    Echo   Results for orders placed or performed during the hospital encounter of 10/28/24   Echo   Result Value Ref Range    BSA 2.12 m2    LVOT stroke volume 47.8 cm3    LVIDd 4.8 3.5 - 6.0 cm    LV Systolic Volume 46.58 mL    LV Systolic Volume Index 23.1 mL/m2    LVIDs 3.4 2.1 - 4.0 cm    LV Diastolic Volume 108.34 mL    LV Diastolic Volume Index 53.63 mL/m2    Left Ventricular End Systolic Volume by Teichholz Method 46.58 mL    Left Ventricular End Diastolic Volume by Teichholz Method 108.34 mL    IVS 1.0 0.6 - 1.1 cm    LVOT diameter 1.8 cm    LVOT area 2.5 cm2    FS 29.2 28 - 44 %    Left Ventricle Relative Wall Thickness 0.46 cm    PW 1.1 0.6 - 1.1 cm    LV mass 181.9 g    LV Mass Index 90.1 g/m2    MV Peak E Chema 1.11 m/s    TDI LATERAL 0.08 m/s    TDI SEPTAL 0.08 m/s    E/E' ratio 13.88 m/s    MV Peak A Chema 1.11 m/s    TR Max Chema 2.80 m/s    E/A ratio 1.00     IVRT 77.07 msec    E wave deceleration time 232.04 msec    LV SEPTAL E/E' RATIO 13.88 m/s    LV LATERAL E/E' RATIO 13.88 m/s    PV Peak S Chema 0.63 m/s    PV Peak D Chema 0.54 m/s    Pulm vein S/D ratio 1.17     LVOT peak chema 0.7 m/s    Left Ventricular Outflow Tract Mean Velocity 0.51 cm/s    Left Ventricular Outflow Tract Mean Gradient 1.13 mmHg    LA size 3.47 cm    Left Atrium Minor Axis 5.31 cm    Left Atrium Major Axis 5.40 cm    RA Major Axis 5.29 cm    AV mean gradient 5.1 mmHg    AV peak gradient 7.8 mmHg    Ao  peak jackie 1.4 m/s    Ao VTI 31.1 cm    LVOT peak VTI 18.8 cm    AV valve area 1.5 cm²    AV Velocity Ratio 0.50     AV index (prosthetic) 0.60     SANIA by Velocity Ratio 1.3 cm²    Mr max jackie 5.98 m/s    MV mean gradient 3 mmHg    MV peak gradient 6 mmHg    MV stenosis pressure 1/2 time 66.65 ms    MV valve area p 1/2 method 3.30 cm2    MV valve area by continuity eq 1.07 cm2    MV VTI 44.7 cm    Triscuspid Valve Regurgitation Peak Gradient 31 mmHg    PV PEAK VELOCITY 1.01 m/s    PV peak gradient 4 mmHg    Pulmonary Valve Mean Velocity 0.72 m/s    Ao root annulus 2.59 cm    STJ 2.28 cm    Ascending aorta 2.52 cm    IVC diameter 1.59 cm    Mean e' 0.08 m/s    ZLVIDS -0.56     ZLVIDD -2.15     RVDD 3.89 cm    AORTIC VALVE CUSP SEPERATION 1.59 cm    JAZZMINE 25.8 mL/m2    LA Vol 52.12 cm3    LA Vol (MOD) 48.00 mL    LA WIDTH 3.3 cm    JAZZMINE (MOD) 23.8 mL/m2    RA Width 3.0 cm    TV resting pulmonary artery pressure 34 mmHg    RV TB RVSP 6 mmHg    Est. RA pres 3 mmHg    EF 60 %    Narrative      Left Ventricle: The left ventricle is normal in size. Ventricular mass   is normal. Normal wall thickness. There is concentric remodeling. There is   normal systolic function. Ejection fraction is approximately 60%. There is   normal diastolic function. Elevated left ventricular filling pressure.   Tissue Doppler velocity is reduced.    Right Ventricle: Normal right ventricular cavity size. Wall thickness   is normal. Systolic function is normal.    Tricuspid Valve: There is mild regurgitation with a centrally directed   jet.    Pulmonary Artery: There is borderline elevated pulmonary hypertension.   The estimated pulmonary artery systolic pressure is 34 mmHg.    IVC/SVC: Normal venous pressure at 3 mmHg.         Imaging  Mammo Digital Screening Bilat w/ Kris    Result Date: 11/20/2024  Facility: [facility] [address] [facility] [place] [id number] [phone] Name: Pat Yung MRN: [id number]  Result: Mammo Digital Screening Bilat w/ Kris History: Patient is 54 y.o. and is seen for a screening mammogram. Films Compared: Compared to: 10/06/2023 Mammo Digital Screening Bilat w/ Kris Findings: This procedure was performed using tomosynthesis. Computer-aided detection was utilized in the interpretation of this examination. The breasts are heterogeneously dense, which may obscure small masses. There is no evidence of suspicious masses, microcalcifications or architectural distortion.      No mammographic evidence of malignancy. BI-RADS Category 1: Negative Recommendation: Routine screening mammogram in 1 year is recommended. Your estimated lifetime risk of breast cancer (to age 85) based on Tyrer-Cuzick risk assessment model is 8.49%.  According to the [employer], patients with a lifetime breast cancer risk of 20% or higher might benefit from supplemental screening tests, such as screening breast MRI. Venkat Johnson MD     MRI Brain Without Contrast    Result Date: 11/12/2024  EXAMINATION: MRI BRAIN WITHOUT CONTRAST CLINICAL HISTORY: Transient ischemic attack (TIA);Left vision blurry; TECHNIQUE: Sagittal and axial T1, axial T2, axial FLAIR, axial gradient and axial diffusion imaging of the whole brain without contrast. COMPARISON: CT earlier same day 11/12/2024 FINDINGS: Study slightly distorted by patient motion artifacts.  There is no restricted diffusion to suggest acute infarction.  Ventricles stable in size without hydrocephalus.  Partially empty sella intracranial hypertension to be considered in differential in appropriate clinical setting Prominent perivascular space right centrum semiovale.  Major skull base T2 flow voids are present.  No abnormal parenchymal susceptibility to suggest parenchymal hemorrhage.     No acute intracranial findings specifically evidence for acute infarction or hydrocephalus Partially empty sella, intracranial hypertension be considered in differential in  appropriate clinical setting. Electronically signed by: Royce Live DO Date:    11/12/2024 Time:    08:03    CT Head Without Contrast    Result Date: 11/12/2024  EXAMINATION: CT HEAD WITHOUT CONTRAST CLINICAL HISTORY: Neuro deficit, acute, stroke suspected;L FACIAL NUMBNESS LAST KNOWN WELL SUNDAY NIGHT; TECHNIQUE: Low dose axial images were obtained through the head.  Coronal and sagittal reformations were also performed. Contrast was not administered. COMPARISON: None. FINDINGS: Blood: No acute intracranial hemorrhage. Parenchyma: No definite loss of gray-white differentiation to suggest acute or subacute transcortical infarct. Nonspecific small focus of hypoattenuation in the right anterior centrum semiovale. Ventricles/Extra-axial spaces: No abnormal extra-axial fluid collection. Basal cisterns are patent. Vessels: Mild atherosclerotic calcification. Orbits: Unremarkable. Scalp: Unremarkable. Skull: There are no depressed skull fractures or destructive bone lesions. Sinuses and mastoids: Relatively modest paranasal mucosal thickening with small retention cysts. Other findings: Partially empty sella configuration with chronic appearing smooth remodeling of the sellar walls.     1. No evidence of acute intracranial hemorrhage, mass effect, or midline shift. 2. Tiny nonspecific area of hypoattenuation in the right anterior centrum semiovale.  Recent white matter ischemia not excluded.  MRI could be performed for more sensitive and specific assessment. Electronically signed by: Juan Cooper Date:    11/12/2024 Time:    07:00    Echo    Result Date: 10/29/2024    Left Ventricle: The left ventricle is normal in size. Ventricular mass is normal. Normal wall thickness. There is concentric remodeling. There is normal systolic function. Ejection fraction is approximately 60%. There is normal diastolic function. Elevated left ventricular filling pressure. Tissue Doppler velocity is reduced.   Right Ventricle: Normal  right ventricular cavity size. Wall thickness is normal. Systolic function is normal.   Tricuspid Valve: There is mild regurgitation with a centrally directed jet.   Pulmonary Artery: There is borderline elevated pulmonary hypertension. The estimated pulmonary artery systolic pressure is 34 mmHg.   IVC/SVC: Normal venous pressure at 3 mmHg.     X-Ray Chest 1 View    Result Date: 10/28/2024  EXAMINATION: XR CHEST 1 VIEW CLINICAL HISTORY: chest pain; TECHNIQUE: Single frontal view of the chest was performed. COMPARISON: 01/14/2024 FINDINGS: No confluent consolidation or pulmonary edema.  Normal cardiomediastinal contours.  No blunting of costophrenic angles.  No acute osseous abnormality.     Clear lungs. Electronically signed by: Bony Pichardo Date:    10/28/2024 Time:    15:40      Prior coronary angiogram / intervention:  No priors    Assessment and Plan  Ms. Yung is a 54-year-old female with a past medical history of hypertension, hyperlipidemia, vitamin-D deficiency, GERD, peptic ulcer disease    Atypical chest pain  Elevated troponin  Recent hospital admission, presented with epigastric/mid chest discomfort and elevated cardiac markers  Intermittent chest discomfort and dyspnea on exertion  Most recent echo normal EF  Will get an exercise stress test to rule out ACS  Blood pressure well controlled; continue aspirin statin    Primary hypertension  Blood pressure good today  Continue amlodipine 5 and valsartan 80  Encouraged Mediterranean Diet recommendations (Adopted from Melissa et al, [facility], 2018.)  - Eat primarily plant-based foods, such as fruits and vegetables, whole grains, legumes (beans) and nuts  - Limit refined carbohydrates (white pasta, bread, rice).  - Replace butter with healthy fats such as olive oil.  - Use herbs and spices instead of salt to flavor foods.  - Limit red meat and processed meats to no more than a few times a month.  - Avoid sugary sodas, bakery goods, and sweets.  - Eat fish and  poultry at least twice a week.              - Get plenty of exercise (150 minutes per week).     Hyperlipidemia  Continue statin   Encouraged an increase in activities for healthy weight management  Repeat lipid panel in 6 months    Gastroesophageal reflux disease, unspecified whether esophagitis present (Primary)  Currently taking omeprazole and p.r.n. simethicone  Encouraged minimizing foods that are acidic, spicy, and high saturated fats    Follow Up  6 months or sooner if new or worsening symptoms arise       Brandi R. Carter, FNP-C [facility] - Cardiology    Total professional time spent for the encounter: 45 minutes  Time was spent preparing to see the patient, reviewing results of prior testing, obtaining and/or reviewing separately obtained history, performing a medically appropriate examination and interview, counseling and educating the patient/family, ordering medications/tests/procedures, referring and communicating with other health care professionals, documenting clinical information in the electronic health record, and independently interpreting results.

## 2024-12-06 DIAGNOSIS — R94.30 ABNORMAL EXERCISE TOLERANCE TEST: ICD-10-CM

## 2024-12-06 DIAGNOSIS — R94.31 ABNORMAL ELECTROCARDIOGRAM (ECG) (EKG): Primary | ICD-10-CM

## 2024-12-10 ENCOUNTER — PATIENT MESSAGE (OUTPATIENT)
Dept: CARDIOLOGY | Facility: CLINIC | Age: 54
End: 2024-12-10
Payer: COMMERCIAL

## 2024-12-31 ENCOUNTER — TELEPHONE (OUTPATIENT)
Dept: CARDIOLOGY | Facility: CLINIC | Age: 54
End: 2024-12-31
Payer: COMMERCIAL

## 2024-12-31 NOTE — TELEPHONE ENCOUNTER
----- Message from COSME Rosario sent at 12/30/2024  5:52 PM CST -----  Hey! Can you schedule an appointment for her to see me on a Wednesday that Dr. Delaney will be in the office? Her stress test showed some abnormalities that may need to be further evaluated. Thanks!  ----- Message -----  From: Mauro Delaney MD  Sent: 12/30/2024   5:37 PM CST  To: COSME Rosario    sure  ----- Message -----  From: Ilana Pugh FNP-C  Sent: 12/30/2024  10:48 AM CST  To: Mauro Delaney MD    Ok. I'll give her call to have her come in for a f/u. Would you like me to see if she can see me on Wednesday that you will be there or have her schedule an appointment with you?  ----- Message -----  From: Mauro Delaney MD  Sent: 12/30/2024   8:56 AM CST  To: COSME Rosario    Yes  ----- Message -----  From: Ilana Pugh FNP-C  Sent: 12/27/2024   7:02 PM CST  To: Mauro Delaney MD    Good candidate for an angiogram?

## 2025-01-03 ENCOUNTER — TELEPHONE (OUTPATIENT)
Dept: CARDIOLOGY | Facility: CLINIC | Age: 55
End: 2025-01-03
Payer: COMMERCIAL

## 2025-01-03 NOTE — TELEPHONE ENCOUNTER
----- Message from Nurse Liat sent at 1/3/2025  9:22 AM CST -----  Can you please reach out to this patient to get her scheduled.     Thanks!  ----- Message -----  From: Ilana Pugh FNP-C  Sent: 12/30/2024   5:54 PM CST  To: KIRSTEN Miller! Can you schedule an appointment for her to see me on a Wednesday that Dr. Delaney will be in the office? Her stress test showed some abnormalities that may need to be further evaluated. Thanks!  ----- Message -----  From: Mauro Delaney MD  Sent: 12/30/2024   5:37 PM CST  To: COSME Rosario    sure  ----- Message -----  From: Ilana Pugh FNP-C  Sent: 12/30/2024  10:48 AM CST  To: Mauro Delaney MD    Ok. I'll give her call to have her come in for a f/u. Would you like me to see if she can see me on Wednesday that you will be there or have her schedule an appointment with you?  ----- Message -----  From: Mauro Delaney MD  Sent: 12/30/2024   8:56 AM CST  To: COSME Rosario    Yes  ----- Message -----  From: Ilana Pugh FNP-C  Sent: 12/27/2024   7:02 PM CST  To: Mauro Delaney MD    Good candidate for an angiogram?

## 2025-01-07 ENCOUNTER — TELEPHONE (OUTPATIENT)
Dept: CARDIOLOGY | Facility: CLINIC | Age: 55
End: 2025-01-07
Payer: COMMERCIAL

## 2025-01-07 NOTE — TELEPHONE ENCOUNTER
Spoke with patient, informed that provider would like to follow up with her in the office.  Patient verbalized understanding.  Appointment scheduled 01/15/2025 1:00.

## 2025-01-07 NOTE — TELEPHONE ENCOUNTER
----- Message from Codie sent at 1/7/2025  1:13 PM CST -----  Regarding: Appt  Contact: Pt  [phone]  Pt is returning a call to schedule a appt please call

## 2025-01-08 ENCOUNTER — OFFICE VISIT (OUTPATIENT)
Dept: PRIMARY CARE CLINIC | Facility: CLINIC | Age: 55
End: 2025-01-08
Payer: COMMERCIAL

## 2025-01-08 VITALS
SYSTOLIC BLOOD PRESSURE: 118 MMHG | HEART RATE: 76 BPM | RESPIRATION RATE: 18 BRPM | OXYGEN SATURATION: 100 % | DIASTOLIC BLOOD PRESSURE: 74 MMHG | BODY MASS INDEX: 39.2 KG/M2 | HEIGHT: 63 IN | WEIGHT: 221.25 LBS

## 2025-01-08 DIAGNOSIS — R07.9 CHEST PAIN, UNSPECIFIED TYPE: Primary | ICD-10-CM

## 2025-01-08 DIAGNOSIS — Z09 FOLLOW-UP EXAM: ICD-10-CM

## 2025-01-08 PROCEDURE — 3078F DIAST BP <80 MM HG: CPT | Mod: CPTII,S$GLB,, | Performed by: STUDENT IN AN ORGANIZED HEALTH CARE EDUCATION/TRAINING PROGRAM

## 2025-01-08 PROCEDURE — 99999 PR PBB SHADOW E&M-EST. PATIENT-LVL III: CPT | Mod: PBBFAC,,, | Performed by: STUDENT IN AN ORGANIZED HEALTH CARE EDUCATION/TRAINING PROGRAM

## 2025-01-08 PROCEDURE — 3074F SYST BP LT 130 MM HG: CPT | Mod: CPTII,S$GLB,, | Performed by: STUDENT IN AN ORGANIZED HEALTH CARE EDUCATION/TRAINING PROGRAM

## 2025-01-08 PROCEDURE — 1159F MED LIST DOCD IN RCRD: CPT | Mod: CPTII,S$GLB,, | Performed by: STUDENT IN AN ORGANIZED HEALTH CARE EDUCATION/TRAINING PROGRAM

## 2025-01-08 PROCEDURE — 3008F BODY MASS INDEX DOCD: CPT | Mod: CPTII,S$GLB,, | Performed by: STUDENT IN AN ORGANIZED HEALTH CARE EDUCATION/TRAINING PROGRAM

## 2025-01-08 PROCEDURE — 99213 OFFICE O/P EST LOW 20 MIN: CPT | Mod: S$GLB,,, | Performed by: STUDENT IN AN ORGANIZED HEALTH CARE EDUCATION/TRAINING PROGRAM

## 2025-01-08 PROCEDURE — 1160F RVW MEDS BY RX/DR IN RCRD: CPT | Mod: CPTII,S$GLB,, | Performed by: STUDENT IN AN ORGANIZED HEALTH CARE EDUCATION/TRAINING PROGRAM

## 2025-01-08 NOTE — LETTER
January 8, 2025      [facility] [address]  [place] [address]  Phone: [phone]  Fax: [phone]       Patient: Pat Yung   YOB: 1970  Date of Visit: 01/08/2025    To Whom It May Concern:    Mart Yung  was at [facility] on 01/08/2025. The patient may return to work/school on 01/08/2025 with no restrictions. If you have any questions or concerns, or if I can be of further assistance, please do not hesitate to contact me.    Sincerely,    Marie Jamison MA

## 2025-01-08 NOTE — PROGRESS NOTES
"Subjective:       Patient ID: Pat Yung is a 54 y.o. female.    Chief Complaint: Follow-up (2 month f/u)    HPI:  54 y.o. female presents to [facility] here for follow-up visit    Has Nuclear stress test 12/18/2024 with finding of fixed defect on imaging. Will see Cardiology 1/15/2024 for follow-up and further recommendations.    Last tetanus vaccine?: Has been some time, will prepare for shot next visit    Declines flu vaccine    Review of Systems   Constitutional:  Negative for chills, diaphoresis, fatigue and fever.   HENT:  Negative for congestion, sinus pressure, sneezing and sore throat.    Respiratory:  Negative for cough and shortness of breath.    Cardiovascular:  Positive for chest pain (Still feels that she has pain with certain dietary foods. Phazyme provides relief.). Negative for palpitations.   Gastrointestinal:  Negative for abdominal pain, diarrhea, nausea and vomiting.   Skin:  Negative for rash and wound.   Neurological:  Negative for dizziness, light-headedness and headaches.       Objective:      Vitals:    01/08/25 1047   BP: 118/74   BP Location: Right arm   Patient Position: Sitting   Pulse: 76   Resp: 18   SpO2: 100%   Weight: 100.4 kg (221 lb 3.7 oz)   Height: 5' 3" (1.6 m)     Physical Exam  Vitals reviewed.   Constitutional:       General: She is not in acute distress.     Appearance: Normal appearance. She is not ill-appearing.   HENT:      Head: Normocephalic and atraumatic.   Eyes:      General:         Right eye: No discharge.         Left eye: No discharge.      Conjunctiva/sclera: Conjunctivae normal.   Cardiovascular:      Rate and Rhythm: Normal rate.   Pulmonary:      Effort: Pulmonary effort is normal.   Musculoskeletal:         General: No deformity.   Skin:     Coloration: Skin is not jaundiced or pale.   Neurological:      General: No focal deficit present.      Mental Status: She is alert and oriented to person, place, and time.   Psychiatric:         Mood and " Affect: Mood normal.         Behavior: Behavior normal.             Lab Results   Component Value Date     11/12/2024    K 3.7 11/12/2024     11/12/2024    CO2 24 11/12/2024    BUN 9 11/12/2024    CREATININE 0.7 11/12/2024    ANIONGAP 10 11/12/2024     Lab Results   Component Value Date    HGBA1C 5.4 05/11/2022     Lab Results   Component Value Date    BNP 14 10/28/2024    BNP <10 01/14/2024       Lab Results   Component Value Date    WBC 6.82 11/12/2024    HGB 13.4 11/12/2024    HCT 40.2 11/12/2024     11/12/2024    GRAN 3.4 11/12/2024    GRAN 50.4 11/12/2024     Lab Results   Component Value Date    CHOL 251 (H) 10/29/2024    HDL 57 10/29/2024    LDLCALC 167.6 (H) 10/29/2024    TRIG 132 10/29/2024          Current Outpatient Medications:     amLODIPine (NORVASC) 5 MG tablet, Take 1 tablet (5 mg total) by mouth once daily., Disp: 90 tablet, Rfl: 3    aspirin (ECOTRIN) 81 MG EC tablet, Take 1 tablet (81 mg total) by mouth once daily., Disp: 90 tablet, Rfl: 3    atorvastatin (LIPITOR) 40 MG tablet, Take 1 tablet (40 mg total) by mouth every evening., Disp: 90 tablet, Rfl: 3    ibuprofen (ADVIL,MOTRIN) 600 MG tablet, Take 1 tablet (600 mg total) by mouth every 6 (six) hours as needed for Pain., Disp: 20 tablet, Rfl: 0    omeprazole (PRILOSEC) 40 MG capsule, Take 1 capsule (40 mg total) by mouth once daily., Disp: 90 capsule, Rfl: 3    valACYclovir (VALTREX) 1000 MG tablet, Take 1 tablet (1,000 mg total) by mouth every 12 (twelve) hours. Begin taking at first sign of outbreak, Disp: 14 tablet, Rfl: 5    valsartan (DIOVAN) 80 MG tablet, Take 1 tablet (80 mg total) by mouth once daily., Disp: 90 tablet, Rfl: 3        Assessment:       1. Chest pain, unspecified type    2. Follow-up exam           Plan:       Chest pain, unspecified type  Follow-up exam  - Abnormal Nuclear stress imaging 12/18/2024, keep follow-up with Cardiology  - If change in chest pain quality, duration, or feeling nausea/sweats  instructed to re-present to ED     RTC in 6 months for annual exam

## 2025-01-15 ENCOUNTER — OFFICE VISIT (OUTPATIENT)
Dept: CARDIOLOGY | Facility: CLINIC | Age: 55
End: 2025-01-15
Payer: COMMERCIAL

## 2025-01-15 VITALS
OXYGEN SATURATION: 99 % | HEART RATE: 77 BPM | BODY MASS INDEX: 39.16 KG/M2 | HEIGHT: 63 IN | DIASTOLIC BLOOD PRESSURE: 72 MMHG | SYSTOLIC BLOOD PRESSURE: 120 MMHG | WEIGHT: 221 LBS

## 2025-01-15 DIAGNOSIS — I10 PRIMARY HYPERTENSION: Primary | ICD-10-CM

## 2025-01-15 DIAGNOSIS — R94.39 ABNORMAL CARDIOVASCULAR STRESS TEST: Primary | ICD-10-CM

## 2025-01-15 DIAGNOSIS — R79.89 ELEVATED TROPONIN: ICD-10-CM

## 2025-01-15 DIAGNOSIS — R07.89 OTHER CHEST PAIN: ICD-10-CM

## 2025-01-15 DIAGNOSIS — R94.39 ABNORMAL CARDIOVASCULAR STRESS TEST: ICD-10-CM

## 2025-01-15 PROCEDURE — 99204 OFFICE O/P NEW MOD 45 MIN: CPT | Mod: S$GLB,,, | Performed by: INTERNAL MEDICINE

## 2025-01-15 PROCEDURE — 99999 PR PBB SHADOW E&M-EST. PATIENT-LVL II: CPT | Mod: PBBFAC,,, | Performed by: INTERNAL MEDICINE

## 2025-01-15 NOTE — PATIENT INSTRUCTIONS
Procedure: Coronary Angiogram  Procedure date: January 22, 2025  Procedure time: 8 am    Arrive at the Outpatient Surgery Unit  at [facility] at 6:30 am.   Nothing to eat or drink after midnight.  Take all morning medication with a sip of water.  4.   Please make arrangements for a family member or friend to bring you home after the procedure. You will not be able to drive home.        If you have any questions, please call our office at [phone].

## 2025-01-15 NOTE — PROGRESS NOTES
Cardiology    1/15/2025  1:45 PM    Problem list  Patient Active Problem List   Diagnosis    Hypertension    Genital herpes in women    GERD (gastroesophageal reflux disease)    Vitamin D deficiency    Other chest pain    Elevated troponin    Abnormal cardiovascular stress test       CC:  CP and abnormal stress test    HPI:  54-year-old woman with history of hypertension, hyperlipidemia, gastroesophageal reflux disease who was referred by Ilana Pugh NP for cardiology for angiogram.  She had episode of chest pain with troponin 0.036 and was evaluated with a stress test which was abnormal.  She denies any prior cardiac problems.  Mother had myocardial infarction but at an unknown age.  Patient reports having chest discomfort palpitation with physical activity.    Medications  Current Outpatient Medications   Medication Sig Dispense Refill    amLODIPine (NORVASC) 5 MG tablet Take 1 tablet (5 mg total) by mouth once daily. 90 tablet 3    aspirin (ECOTRIN) 81 MG EC tablet Take 1 tablet (81 mg total) by mouth once daily. 90 tablet 3    atorvastatin (LIPITOR) 40 MG tablet Take 1 tablet (40 mg total) by mouth every evening. 90 tablet 3    ibuprofen (ADVIL,MOTRIN) 600 MG tablet Take 1 tablet (600 mg total) by mouth every 6 (six) hours as needed for Pain. 20 tablet 0    omeprazole (PRILOSEC) 40 MG capsule Take 1 capsule (40 mg total) by mouth once daily. 90 capsule 3    valACYclovir (VALTREX) 1000 MG tablet Take 1 tablet (1,000 mg total) by mouth every 12 (twelve) hours. Begin taking at first sign of outbreak 14 tablet 5    valsartan (DIOVAN) 80 MG tablet Take 1 tablet (80 mg total) by mouth once daily. 90 tablet 3     No current facility-administered medications for this visit.      Prior to Admission medications    Medication Sig Start Date End Date Taking? Authorizing Provider   amLODIPine (NORVASC) 5 MG tablet Take 1 tablet (5 mg total) by mouth once daily. 12/20/23   Jero Weinberg MD   aspirin (ECOTRIN)  81 MG EC tablet Take 1 tablet (81 mg total) by mouth once daily. 10/29/24 10/29/25  Aliyah Uriostegui MD   atorvastatin (LIPITOR) 40 MG tablet Take 1 tablet (40 mg total) by mouth every evening. 10/29/24 10/29/25  Aliyah Uriostegui MD   ibuprofen (ADVIL,MOTRIN) 600 MG tablet Take 1 tablet (600 mg total) by mouth every 6 (six) hours as needed for Pain. 24   Curtis Marx MD   omeprazole (PRILOSEC) 40 MG capsule Take 1 capsule (40 mg total) by mouth once daily. 24  Jero Weinberg MD   valACYclovir (VALTREX) 1000 MG tablet Take 1 tablet (1,000 mg total) by mouth every 12 (twelve) hours. Begin taking at first sign of outbreak 7/10/24   Jero Weinberg MD   valsartan (DIOVAN) 80 MG tablet Take 1 tablet (80 mg total) by mouth once daily. 24  Sandra Genao, [facility]         History  Past Medical History:   Diagnosis Date    GERD (gastroesophageal reflux disease)     Herpes simplex without mention of complication     Hyperlipidemia     Hypertension     Ulcer     Vitamin D deficiency      Past Surgical History:   Procedure Laterality Date     SECTION  1998    x 1    COLONOSCOPY N/A 2021    Procedure: COLONOSCOPY;  Surgeon: Jesu Canas MD;  Location: [facility] ENDO;  Service: Endoscopy;  Laterality: N/A;  covid / stbernard -ml    ESOPHAGOGASTRODUODENOSCOPY  2022    ESOPHAGOGASTRODUODENOSCOPY N/A 2022    Procedure: EGD (ESOPHAGOGASTRODUODENOSCOPY);  Surgeon: Bhupendra Delacruz MD;  Location: [facility] ENDO;  Service: Endoscopy;  Laterality: N/A;    TUBAL LIGATION       Social History     Socioeconomic History    Marital status:    Tobacco Use    Smoking status: Never    Smokeless tobacco: Never   Substance and Sexual Activity    Alcohol use: Not Currently     Alcohol/week: 1.0 standard drink of alcohol     Types: 1 Glasses of wine per week     Comment: This is Sometimes with guest or family every two months    Drug use: No    Sexual activity: Yes  "    Partners: Male     Birth control/protection: None     Social Drivers of Health     Financial Resource Strain: High Risk (1/15/2025)    Overall Financial Resource Strain (CARDIA)     Difficulty of Paying Living Expenses: Hard   Food Insecurity: Food Insecurity Present (1/15/2025)    Hunger Vital Sign     Worried About Running Out of Food in the Last Year: Sometimes true     Ran Out of Food in the Last Year: Often true   Transportation Needs: Patient Declined (10/28/2024)    TRANSPORTATION NEEDS     Transportation : Patient declined   Physical Activity: Insufficiently Active (1/15/2025)    Exercise Vital Sign     Days of Exercise per Week: 7 days     Minutes of Exercise per Session: 10 min   Stress: Stress Concern Present (1/15/2025)    [employer] - Occupational Stress Questionnaire     Feeling of Stress : To some extent   Housing Stability: Patient Declined (1/15/2025)    Housing Stability Vital Sign     Unable to Pay for Housing in the Last Year: Patient declined     Homeless in the Last Year: Patient declined         Allergies  Review of patient's allergies indicates:   Allergen Reactions    Nexium [esomeprazole magnesium] Hives     Patient says that began having "break-outs" on her abdomen    Lisinopril Other (See Comments)     Cough          Review of Systems   Review of Systems   Constitutional: Negative for chills, decreased appetite, diaphoresis, fever, malaise/fatigue, weight gain and weight loss.   Eyes:  Negative for blurred vision.   Cardiovascular:  Positive for dyspnea on exertion. Negative for chest pain, claudication, irregular heartbeat, leg swelling, near-syncope, orthopnea, palpitations, paroxysmal nocturnal dyspnea and syncope.        Complains of epigastric/mid chest discomfort   Respiratory:  Negative for cough, shortness of breath, snoring, sputum production and wheezing.    Endocrine: Negative for cold intolerance, heat intolerance, polydipsia, polyphagia and " polyuria.   Skin:  Negative for color change, dry skin, itching, nail changes and poor wound healing.   Musculoskeletal:  Negative for back pain, gout, joint pain and joint swelling.   Gastrointestinal:  Negative for bloating, abdominal pain, constipation, diarrhea, hematemesis, hematochezia, melena, nausea and vomiting.   Genitourinary:  Negative for dysuria and hematuria.   Neurological:  Negative for dizziness, headaches, light-headedness, numbness, paresthesias and weakness.   Psychiatric/Behavioral:  Negative for altered mental status, depression and memory loss.          Physical Exam  Wt Readings from Last 1 Encounters:   01/15/25 100.2 kg (221 lb)     BP Readings from Last 3 Encounters:   01/15/25 120/72   01/08/25 118/74   11/26/24 132/84     Pulse Readings from Last 1 Encounters:   01/15/25 77     There is no height or weight on file to calculate BMI.    Physical Exam  Vitals reviewed.   Constitutional:       Appearance: She is well-developed.   HENT:      Head: Atraumatic.   Eyes:      General: No scleral icterus.  Neck:      Vascular: Normal carotid pulses. No carotid bruit, hepatojugular reflux or JVD.   Cardiovascular:      Rate and Rhythm: Normal rate and regular rhythm.      Chest Wall: PMI is not displaced.      Pulses: Intact distal pulses.           Carotid pulses are 2+ on the right side and 2+ on the left side.       Radial pulses are 2+ on the right side and 2+ on the left side.        Dorsalis pedis pulses are 2+ on the right side and 2+ on the left side.      Heart sounds: Normal heart sounds, S1 normal and S2 normal. No murmur heard.     No friction rub.   Pulmonary:      Effort: Pulmonary effort is normal. No respiratory distress.      Breath sounds: Normal breath sounds. No stridor. No wheezing or rales.   Chest:      Chest wall: No tenderness.   Abdominal:      General: Bowel sounds are normal.      Palpations: Abdomen is soft.   Musculoskeletal:      Cervical back: Neck supple. No edema.    Skin:     General: Skin is warm and dry.      Nails: There is no clubbing.   Neurological:      Mental Status: She is alert and oriented to person, place, and time.   Psychiatric:         Behavior: Behavior normal.         Thought Content: Thought content normal.             Assessment  1. Primary hypertension (Primary)  Controlled, continue meds and monitor    2. Elevated troponin  Being evaluated    3. Other chest pain  Being evaluated    4. Abnormal cardiovascular stress test  Being evaluated        Plan and Discussion  Discussed her episode of chest pain with abnormal troponin and abnormal nuclear stress test.  Discussed proceeding with coronary angiogram.  We specifically discussed the risks benefits indication alternatives to coronary angiogram.  The risks, benefits, indications and alternatives of the planned procedure were discussed in details.  Discussed with patient the risks and benefits of the procedure including, but not limited to, the following; 1:1000 risk of heart attack, stroke and death with 3-5% risk of bleeding, vessel damage (in the arms, legs or in the heart), and the need for emergent surgery, including open heart surgery.  All questions were answered.  The patient agreed to proceed via right radial artery access.  Requesting [facility] [place].      Follow Up  1 month      Mauro Delaney MD, F.A.C.C, F.S.C.A.I.      Total professional time spent for the encounter: 30 minutes  Time was spent preparing to see the patient, reviewing results of prior testing, obtaining and/or reviewing separately obtained history, performing a medically appropriate examination and interview, counseling and educating the patient/family, ordering medications/tests/procedures, referring and communicating with other health care professionals, documenting clinical information in the electronic health record, and independently interpreting results.    Disclaimer: This document was created using voice recognition software  (M*Modal Fluency Direct). Although it may be edited, this document may contain errors related to incorrect recognition of the spoken word. Please call the physician if clarification is needed.

## 2025-02-05 ENCOUNTER — TELEPHONE (OUTPATIENT)
Dept: CARDIOTHORACIC SURGERY | Facility: CLINIC | Age: 55
End: 2025-02-05
Payer: COMMERCIAL

## 2025-02-05 DIAGNOSIS — I20.0 UNSTABLE ANGINA PECTORIS: Primary | ICD-10-CM

## 2025-02-05 NOTE — TELEPHONE ENCOUNTER
Called pt to confirm if the appt we scheduled for her to have a consult with Dr. Nugent works for her. She confirmed and verbalized understanding of appt date, time, and location. Pt denies any questions at this time.

## 2025-02-10 ENCOUNTER — TELEPHONE (OUTPATIENT)
Dept: CARDIOTHORACIC SURGERY | Facility: CLINIC | Age: 55
End: 2025-02-10
Payer: COMMERCIAL

## 2025-02-10 NOTE — TELEPHONE ENCOUNTER
Spoke with pt and confirmed 2/0 appt with Dr. Nugent. Pt verbalized understanding of appt time and location. Pt denies any questions at this time.

## 2025-02-11 ENCOUNTER — TELEPHONE (OUTPATIENT)
Dept: CARDIOTHORACIC SURGERY | Facility: CLINIC | Age: 55
End: 2025-02-11

## 2025-02-11 ENCOUNTER — OFFICE VISIT (OUTPATIENT)
Dept: CARDIOTHORACIC SURGERY | Facility: CLINIC | Age: 55
End: 2025-02-11
Payer: COMMERCIAL

## 2025-02-11 VITALS
BODY MASS INDEX: 38.88 KG/M2 | HEART RATE: 65 BPM | OXYGEN SATURATION: 99 % | SYSTOLIC BLOOD PRESSURE: 137 MMHG | HEIGHT: 63 IN | DIASTOLIC BLOOD PRESSURE: 63 MMHG | WEIGHT: 219.44 LBS

## 2025-02-11 DIAGNOSIS — I20.0 UNSTABLE ANGINA PECTORIS: ICD-10-CM

## 2025-02-11 DIAGNOSIS — I25.119 CORONARY ARTERY DISEASE INVOLVING NATIVE CORONARY ARTERY OF NATIVE HEART WITH ANGINA PECTORIS: Primary | ICD-10-CM

## 2025-02-11 DIAGNOSIS — Z01.818 PRE-OP TESTING: ICD-10-CM

## 2025-02-11 PROBLEM — I25.10 CORONARY ARTERY DISEASE INVOLVING NATIVE CORONARY ARTERY OF NATIVE HEART: Status: ACTIVE | Noted: 2025-02-11

## 2025-02-11 PROCEDURE — 99999 PR PBB SHADOW E&M-EST. PATIENT-LVL IV: CPT | Mod: PBBFAC,,, | Performed by: THORACIC SURGERY (CARDIOTHORACIC VASCULAR SURGERY)

## 2025-02-11 NOTE — TELEPHONE ENCOUNTER
Called pt to review pre-op appointments which have been scheduled for February 17 and February 20. Also reviewed pt's medications. Pt will need to take last dose of amlodipine and valsartan on Thursday, February 20 in preparation for surgery. Pt verbalized understanding of all information.

## 2025-02-11 NOTE — PROGRESS NOTES
"Subjective:      Patient ID: Pat Yung is a 54 y.o. female.    Chief Complaint: No chief complaint on file.      HPI:  Pat Yung is a 54 y.o. female who presents as an initial consult for multivessel coronary artery disease. She has a medical history significant for hypertension, hyperlipidemia, gastroesophageal reflux disease.  She is referred by Dr. Delaney.  She had chest pain that prompted a cardiac evaluation.  During this evaluation, her troponin was elevated to 0.036 and her stress test was abnormal which prompted further investigation.  A LHC was done which demonstrated Ost LAD to Prox LAD lesion was 99% stenosed. She referred to discuss surgical revascularization.    Family and social history reviewed    Review of patient's allergies indicates:   Allergen Reactions    Nexium [esomeprazole magnesium] Hives     Patient says that began having "break-outs" on her abdomen    Lisinopril Other (See Comments)     Cough      Past Medical History:   Diagnosis Date    GERD (gastroesophageal reflux disease)     Herpes simplex without mention of complication     Hyperlipidemia     Hypertension     Ulcer     Vitamin D deficiency      Past Surgical History:   Procedure Laterality Date    ANGIOGRAM, CORONARY ARTERY - Right Right 2025     SECTION  1998    x 1    COLONOSCOPY N/A 2021    Procedure: COLONOSCOPY;  Surgeon: Jesu Canas MD;  Location: [facility];  Service: Endoscopy;  Laterality: N/A;  covid  stbernard -ml    CORONARY ANGIOGRAPHY Right 2025    Procedure: ANGIOGRAM, CORONARY ARTERY;  Surgeon: Mauro Delaney MD;  Location: [facility] CATH LAB;  Service: Cardiology;  Laterality: Right;    ESOPHAGOGASTRODUODENOSCOPY  2022    ESOPHAGOGASTRODUODENOSCOPY N/A 2022    Procedure: EGD (ESOPHAGOGASTRODUODENOSCOPY);  Surgeon: Bhupendra Delacruz MD;  Location: [facility];  Service: Endoscopy;  Laterality: N/A;    TUBAL LIGATION       Family History       Problem " Relation (Age of Onset)    COPD Maternal Grandfather    Diabetes Maternal Grandmother    Heart disease Maternal Grandmother, Maternal Grandfather    Hyperlipidemia Maternal Grandfather    Hypertension Maternal Grandfather    Kidney disease Maternal Grandmother    Sarcoidosis Mother    Stroke Maternal Grandmother, Maternal Grandfather    Vision loss Maternal Grandmother, Paternal Grandmother    Vitiligo Mother          Social History     Socioeconomic History    Marital status:    Tobacco Use    Smoking status: Never    Smokeless tobacco: Never   Substance and Sexual Activity    Alcohol use: Not Currently     Alcohol/week: 1.0 standard drink of alcohol     Types: 1 Glasses of wine per week     Comment: This is Sometimes with guest or family every two months    Drug use: No    Sexual activity: Yes     Partners: Male     Birth control/protection: None     Social Drivers of Health     Financial Resource Strain: High Risk (1/15/2025)    Overall Financial Resource Strain (CARDIA)     Difficulty of Paying Living Expenses: Hard   Food Insecurity: Food Insecurity Present (1/15/2025)    Hunger Vital Sign     Worried About Running Out of Food in the Last Year: Sometimes true     Ran Out of Food in the Last Year: Often true   Transportation Needs: Patient Declined (10/28/2024)    TRANSPORTATION NEEDS     Transportation : Patient declined   Physical Activity: Insufficiently Active (1/15/2025)    Exercise Vital Sign     Days of Exercise per Week: 7 days     Minutes of Exercise per Session: 10 min   Stress: Stress Concern Present (1/15/2025)    [employer] [place] [employer] - Occupational Stress Questionnaire     Feeling of Stress : To some extent   Housing Stability: Patient Declined (1/15/2025)    Housing Stability Vital Sign     Unable to Pay for Housing in the Last Year: Patient declined     Homeless in the Last Year: Patient declined       Current Outpatient Medications:     amLODIPine (NORVASC) 5 MG  tablet, Take 1 tablet (5 mg total) by mouth once daily., Disp: 90 tablet, Rfl: 3    aspirin (ECOTRIN) 81 MG EC tablet, Take 1 tablet (81 mg total) by mouth once daily., Disp: 90 tablet, Rfl: 3    atorvastatin (LIPITOR) 40 MG tablet, Take 1 tablet (40 mg total) by mouth every evening., Disp: 90 tablet, Rfl: 3    ibuprofen (ADVIL,MOTRIN) 600 MG tablet, Take 1 tablet (600 mg total) by mouth every 6 (six) hours as needed for Pain., Disp: 20 tablet, Rfl: 0    omeprazole (PRILOSEC) 40 MG capsule, Take 1 capsule (40 mg total) by mouth once daily., Disp: 90 capsule, Rfl: 3    valACYclovir (VALTREX) 1000 MG tablet, Take 1 tablet (1,000 mg total) by mouth every 12 (twelve) hours. Begin taking at first sign of outbreak, Disp: 14 tablet, Rfl: 5    valsartan (DIOVAN) 80 MG tablet, Take 1 tablet (80 mg total) by mouth once daily., Disp: 90 tablet, Rfl: 3  Current medications Reviewed    Review of Systems   Constitutional:  Negative for activity change, appetite change, fatigue and fever.   HENT:  Negative for nosebleeds.    Respiratory:  Negative for cough and shortness of breath.    Cardiovascular:  Negative for chest pain, palpitations and leg swelling.   Gastrointestinal:  Negative for abdominal distention, abdominal pain and nausea.   Genitourinary:  Negative for frequency.   Musculoskeletal:  Negative for arthralgias and myalgias.   Skin:  Negative for rash.   Neurological:  Negative for dizziness and numbness.   Hematological:  Does not bruise/bleed easily.     Objective:   Physical Exam  HENT:      Head: Normocephalic and atraumatic.   Eyes:      Extraocular Movements: Extraocular movements intact.   Cardiovascular:      Rate and Rhythm: Normal rate and regular rhythm.   Pulmonary:      Effort: Pulmonary effort is normal.   Abdominal:      General: Abdomen is flat.      Palpations: Abdomen is soft.   Musculoskeletal:         General: Normal range of motion.      Cervical back: Normal range of motion.   Skin:     General:  Skin is warm and dry.      Capillary Refill: Capillary refill takes less than 2 seconds.   Neurological:      General: No focal deficit present.         Diagnostic Results: Reviewed  [facility]: 2/5/2025    The Ost LAD to Prox LAD lesion was 99% stenosed.    Refer for CABG evaluation.    Nuc Med: 12/18/2024  Impression:  1. Medium in size moderate fixed defect in the apex and anteroseptal wall with recent elevated troponins from 10/24 likely represent fixed coronary artery disease.  Attention to follow.  No scintigraphic evidence of exercise or pharmacological stress induced myocardial ischemic changes  2. The global left ventricular systolic function is normal with an LV ejection fraction of 51 % and no evidence of LV dilatation. Wall motion is normal.  This report was flagged in Epic as abnormal.    ECHO: 10/29/2024    Left Ventricle: The left ventricle is normal in size. Ventricular mass is normal. Normal wall thickness. There is concentric remodeling. There is normal systolic function. Ejection fraction is approximately 60%. There is normal diastolic function. Elevated left ventricular filling pressure. Tissue Doppler velocity is reduced.    Right Ventricle: Normal right ventricular cavity size. Wall thickness is normal. Systolic function is normal.    Tricuspid Valve: There is mild regurgitation with a centrally directed jet.    Pulmonary Artery: There is borderline elevated pulmonary hypertension. The estimated pulmonary artery systolic pressure is 34 mmHg.    IVC/SVC: Normal venous pressure at 3 mmHg.  Assessment:   Multivessel Coronary Artery Disease  Plan:     CTS Attending Note:    I have personally taken the history and examined this patient and agree with the JOSHUA's note as stated above.  54-year-old woman who initially presented with chest pain.  She had a mild troponin elevation.  She had a stress test which was positive, and follow-up coronary angiography demonstrated severe proximal LAD disease.  I  recommended single-vessel bypass, left internal mammary artery to left anterior descending, and she agreed with this.  We discussed the risks and benefits of the proposed procedure, including but not limited to death, stroke, respiratory complications, renal complications, arrythmia, and infection. We discussed the STS predicted risk. Her questions have been answered, and she wishes to proceed.

## 2025-02-18 ENCOUNTER — TELEPHONE (OUTPATIENT)
Dept: CARDIOTHORACIC SURGERY | Facility: CLINIC | Age: 55
End: 2025-02-18
Payer: COMMERCIAL

## 2025-02-18 NOTE — TELEPHONE ENCOUNTER
Returned call to pt. Pt asking to confirm which medications she needs to stop prior to surgery. Advised pt to take last doses of amlodipine and valsartan on Thursday, February 20.   Pt also asking about having short term disability paperwork filled out. Provided pt with fax and email for Disability Department and advised there is a physical office on the first floor near the clinic elevators if she would like to go in person on Thursday.   Pt verbalized understanding of all information.

## 2025-02-19 ENCOUNTER — TELEPHONE (OUTPATIENT)
Dept: CARDIOTHORACIC SURGERY | Facility: CLINIC | Age: 55
End: 2025-02-19
Payer: COMMERCIAL

## 2025-02-19 NOTE — TELEPHONE ENCOUNTER
Called pt to confirm pre-op appointments which have been scheduled for tomorrow. Pt verbalized understanding of appointments and denies any questions or concerns at this time.

## 2025-02-20 ENCOUNTER — HOSPITAL ENCOUNTER (OUTPATIENT)
Dept: VASCULAR SURGERY | Facility: CLINIC | Age: 55
Discharge: HOME | End: 2025-02-20
Attending: THORACIC SURGERY (CARDIOTHORACIC VASCULAR SURGERY)
Payer: COMMERCIAL

## 2025-02-20 ENCOUNTER — HOSPITAL ENCOUNTER (OUTPATIENT)
Dept: PULMONOLOGY | Facility: CLINIC | Age: 55
Discharge: HOME | End: 2025-02-20
Payer: COMMERCIAL

## 2025-02-20 ENCOUNTER — HOSPITAL ENCOUNTER (OUTPATIENT)
Dept: RADIOLOGY | Facility: HOSPITAL | Age: 55
Discharge: HOME | End: 2025-02-20
Attending: THORACIC SURGERY (CARDIOTHORACIC VASCULAR SURGERY)
Payer: COMMERCIAL

## 2025-02-20 ENCOUNTER — OFFICE VISIT (OUTPATIENT)
Dept: CARDIOTHORACIC SURGERY | Facility: CLINIC | Age: 55
End: 2025-02-20
Payer: COMMERCIAL

## 2025-02-20 ENCOUNTER — HOSPITAL ENCOUNTER (OUTPATIENT)
Dept: CARDIOLOGY | Facility: CLINIC | Age: 55
Discharge: HOME | End: 2025-02-20
Payer: COMMERCIAL

## 2025-02-20 VITALS
BODY MASS INDEX: 39.08 KG/M2 | OXYGEN SATURATION: 100 % | DIASTOLIC BLOOD PRESSURE: 72 MMHG | WEIGHT: 220.56 LBS | HEART RATE: 62 BPM | HEIGHT: 63 IN | SYSTOLIC BLOOD PRESSURE: 150 MMHG

## 2025-02-20 DIAGNOSIS — I25.119 CORONARY ARTERY DISEASE INVOLVING NATIVE CORONARY ARTERY OF NATIVE HEART WITH ANGINA PECTORIS: ICD-10-CM

## 2025-02-20 DIAGNOSIS — Z01.818 PRE-OP TESTING: ICD-10-CM

## 2025-02-20 DIAGNOSIS — I25.119 CORONARY ARTERY DISEASE INVOLVING NATIVE CORONARY ARTERY OF NATIVE HEART WITH ANGINA PECTORIS: Primary | ICD-10-CM

## 2025-02-20 DIAGNOSIS — I25.10 CAD (CORONARY ARTERY DISEASE): ICD-10-CM

## 2025-02-20 LAB
DLCO ADJ PRE: 15.1 ML/(MIN*MMHG) (ref 17.27–28.74)
DLCO SINGLE BREATH LLN: 17.27
DLCO SINGLE BREATH PRE REF: 64.8 %
DLCO SINGLE BREATH REF: 23
DLCOC SBVA LLN: 3.26
DLCOC SBVA PRE REF: 87.5 %
DLCOC SBVA REF: 4.82
DLCOC SINGLE BREATH LLN: 17.27
DLCOC SINGLE BREATH PRE REF: 65.6 %
DLCOC SINGLE BREATH REF: 23
DLCOCSBVAULN: 6.38
DLCOCSINGLEBREATHULN: 28.74
DLCOCSINGLEBREATHZSCORE: -2.27
DLCOSINGLEBREATHULN: 28.74
DLCOSINGLEBREATHZSCORE: -2.32
DLCOVA LLN: 3.26
DLCOVA PRE REF: 86.4 %
DLCOVA PRE: 4.16 ML/(MIN*MMHG*L) (ref 3.26–6.38)
DLCOVA REF: 4.82
DLCOVAULN: 6.38
DLVAADJ PRE: 4.22 ML/(MIN*MMHG*L) (ref 3.26–6.38)
FEF 25 75 LLN: 1.69
FEF 25 75 PRE REF: 96.3 %
FEF 25 75 REF: 3.08
FEV05 LLN: 1.02
FEV05 REF: 1.88
FEV1 FVC LLN: 69
FEV1 FVC PRE REF: 101.8 %
FEV1 FVC REF: 81
FEV1 LLN: 1.65
FEV1 PRE REF: 103.3 %
FEV1 REF: 2.21
FEV1FVCZSCORE: 0.24
FEV1ZSCORE: 0.22
FVC LLN: 2.07
FVC PRE REF: 101.1 %
FVC REF: 2.75
FVCZSCORE: 0.07
IVC PRE: 2.56 L (ref 2.07–3.46)
IVC SINGLE BREATH LLN: 2.07
IVC SINGLE BREATH PRE REF: 93.1 %
IVC SINGLE BREATH REF: 2.75
IVCSINGLEBREATHULN: 3.46
MVV LLN: 81
MVV PRE REF: 71.9 %
MVV REF: 95
OHS QRS DURATION: 84 MS
OHS QTC CALCULATION: 457 MS
PEF LLN: 3.87
PEF PRE REF: 104 %
PEF REF: 5.81
PHYSICIAN COMMENT: ABNORMAL
PRE DLCO: 14.91 ML/(MIN*MMHG) (ref 17.27–28.74)
PRE FEF 25 75: 2.97 L/S (ref 1.69–4.48)
PRE FET 100: 6.62 SEC
PRE FEV05 REF: 103.4 %
PRE FEV1 FVC: 82.02 % (ref 69.4–90.06)
PRE FEV1: 2.28 L (ref 1.65–2.75)
PRE FEV5: 1.94 L (ref 1.02–2.73)
PRE FVC: 2.78 L (ref 2.07–3.46)
PRE MVV: 68.17 L/MIN (ref 80.61–109.06)
PRE PEF: 6.05 L/S (ref 3.87–7.76)
VA PRE: 3.58 L (ref 4.62–4.62)
VA SINGLE BREATH LLN: 4.62
VA SINGLE BREATH PRE REF: 77.5 %
VA SINGLE BREATH REF: 4.62
VASINGLEBREATHULN: 4.62

## 2025-02-20 PROCEDURE — 71046 X-RAY EXAM CHEST 2 VIEWS: CPT | Mod: TC

## 2025-02-20 RX ORDER — DEXTROSE MONOHYDRATE, SODIUM CHLORIDE, AND POTASSIUM CHLORIDE 50; 1.49; 4.5 G/1000ML; G/1000ML; G/1000ML
INJECTION, SOLUTION INTRAVENOUS CONTINUOUS
OUTPATIENT
Start: 2025-02-20

## 2025-02-20 RX ORDER — OXYCODONE HYDROCHLORIDE 5 MG/1
5 TABLET ORAL EVERY 4 HOURS PRN
Refills: 0 | OUTPATIENT
Start: 2025-02-20

## 2025-02-20 RX ORDER — IPRATROPIUM BROMIDE AND ALBUTEROL SULFATE 2.5; .5 MG/3ML; MG/3ML
3 SOLUTION RESPIRATORY (INHALATION) EVERY 4 HOURS
OUTPATIENT
Start: 2025-02-20 | End: 2025-02-21

## 2025-02-20 RX ORDER — IPRATROPIUM BROMIDE AND ALBUTEROL SULFATE 2.5; .5 MG/3ML; MG/3ML
3 SOLUTION RESPIRATORY (INHALATION) EVERY 4 HOURS PRN
OUTPATIENT
Start: 2025-02-20 | End: 2025-02-21

## 2025-02-20 RX ORDER — MAGNESIUM SULFATE HEPTAHYDRATE 40 MG/ML
2 INJECTION, SOLUTION INTRAVENOUS
OUTPATIENT
Start: 2025-02-20

## 2025-02-20 RX ORDER — ONDANSETRON HYDROCHLORIDE 2 MG/ML
4 INJECTION, SOLUTION INTRAVENOUS EVERY 12 HOURS PRN
OUTPATIENT
Start: 2025-02-20

## 2025-02-20 RX ORDER — POLYETHYLENE GLYCOL 3350 17 G/17G
17 POWDER, FOR SOLUTION ORAL DAILY
OUTPATIENT
Start: 2025-02-21

## 2025-02-20 RX ORDER — SODIUM CHLORIDE 9 MG/ML
INJECTION, SOLUTION INTRAVENOUS CONTINUOUS
OUTPATIENT
Start: 2025-02-20

## 2025-02-20 RX ORDER — ACETAMINOPHEN 325 MG/1
650 TABLET ORAL EVERY 4 HOURS PRN
OUTPATIENT
Start: 2025-02-20

## 2025-02-20 RX ORDER — LIDOCAINE HYDROCHLORIDE 10 MG/ML
1 INJECTION, SOLUTION EPIDURAL; INFILTRATION; INTRACAUDAL; PERINEURAL
OUTPATIENT
Start: 2025-02-20

## 2025-02-20 RX ORDER — POTASSIUM CHLORIDE 29.8 MG/ML
40 INJECTION INTRAVENOUS
OUTPATIENT
Start: 2025-02-20

## 2025-02-20 RX ORDER — ASPIRIN 300 MG/1
300 SUPPOSITORY RECTAL ONCE AS NEEDED
OUTPATIENT
Start: 2025-02-20 | End: 2036-07-19

## 2025-02-20 RX ORDER — FAMOTIDINE 20 MG/1
20 TABLET, FILM COATED ORAL 2 TIMES DAILY
OUTPATIENT
Start: 2025-02-20

## 2025-02-20 RX ORDER — ASPIRIN 325 MG
325 TABLET, DELAYED RELEASE (ENTERIC COATED) ORAL DAILY
OUTPATIENT
Start: 2025-02-21

## 2025-02-20 RX ORDER — POTASSIUM CHLORIDE 14.9 MG/ML
20 INJECTION INTRAVENOUS
OUTPATIENT
Start: 2025-02-20

## 2025-02-20 RX ORDER — NAPROXEN SODIUM 220 MG/1
81 TABLET, FILM COATED ORAL ONCE
OUTPATIENT
Start: 2025-02-20 | End: 2025-02-20

## 2025-02-20 RX ORDER — METOPROLOL TARTRATE 25 MG/1
25 TABLET, FILM COATED ORAL
OUTPATIENT
Start: 2025-02-20

## 2025-02-20 RX ORDER — BISACODYL 10 MG/1
10 SUPPOSITORY RECTAL DAILY PRN
OUTPATIENT
Start: 2025-02-20

## 2025-02-20 RX ORDER — POTASSIUM CHLORIDE 14.9 MG/ML
60 INJECTION INTRAVENOUS
OUTPATIENT
Start: 2025-02-20

## 2025-02-20 RX ORDER — FENTANYL CITRATE 50 UG/ML
25 INJECTION, SOLUTION INTRAMUSCULAR; INTRAVENOUS
Refills: 0 | OUTPATIENT
Start: 2025-02-20 | End: 2025-02-25

## 2025-02-20 RX ORDER — METOCLOPRAMIDE HYDROCHLORIDE 5 MG/ML
5 INJECTION INTRAMUSCULAR; INTRAVENOUS EVERY 6 HOURS PRN
OUTPATIENT
Start: 2025-02-20

## 2025-02-20 RX ORDER — ALBUMIN HUMAN 50 G/1000ML
25 SOLUTION INTRAVENOUS ONCE AS NEEDED
OUTPATIENT
Start: 2025-02-20 | End: 2036-07-19

## 2025-02-20 RX ORDER — FAMOTIDINE 10 MG/ML
20 INJECTION INTRAVENOUS 2 TIMES DAILY
OUTPATIENT
Start: 2025-02-20

## 2025-02-20 RX ORDER — PROPOFOL 10 MG/ML
0-50 INJECTION, EMULSION INTRAVENOUS CONTINUOUS
OUTPATIENT
Start: 2025-02-20

## 2025-02-20 RX ORDER — MUPIROCIN 20 MG/G
1 OINTMENT TOPICAL
OUTPATIENT
Start: 2025-02-20

## 2025-02-20 RX ORDER — SODIUM CHLORIDE 0.9 % (FLUSH) 0.9 %
10 SYRINGE (ML) INJECTION
OUTPATIENT
Start: 2025-02-20

## 2025-02-20 RX ORDER — NAPROXEN SODIUM 220 MG/1
81 TABLET, FILM COATED ORAL DAILY
OUTPATIENT
Start: 2025-02-21

## 2025-02-20 RX ORDER — MUPIROCIN 20 MG/G
1 OINTMENT TOPICAL 2 TIMES DAILY
OUTPATIENT
Start: 2025-02-20 | End: 2025-02-25

## 2025-02-20 RX ORDER — ATORVASTATIN CALCIUM 40 MG/1
40 TABLET, FILM COATED ORAL NIGHTLY
OUTPATIENT
Start: 2025-02-20

## 2025-02-20 RX ORDER — DOCUSATE SODIUM 100 MG/1
100 CAPSULE, LIQUID FILLED ORAL 2 TIMES DAILY
OUTPATIENT
Start: 2025-02-20

## 2025-02-20 RX ORDER — FENTANYL CITRATE 50 UG/ML
25 INJECTION, SOLUTION INTRAMUSCULAR; INTRAVENOUS
Refills: 0 | OUTPATIENT
Start: 2025-02-20

## 2025-02-20 RX ORDER — FENTANYL CITRATE 50 UG/ML
50 INJECTION, SOLUTION INTRAMUSCULAR; INTRAVENOUS
Refills: 0 | OUTPATIENT
Start: 2025-02-26

## 2025-02-20 RX ORDER — POTASSIUM CHLORIDE 20 MEQ/1
20 TABLET, EXTENDED RELEASE ORAL EVERY 12 HOURS
OUTPATIENT
Start: 2025-02-20

## 2025-02-20 RX ORDER — OXYCODONE HYDROCHLORIDE 10 MG/1
10 TABLET ORAL EVERY 4 HOURS PRN
Refills: 0 | OUTPATIENT
Start: 2025-02-20

## 2025-02-20 NOTE — PROGRESS NOTES
"Subjective:      Patient ID: Pat Yung is a 54 y.o. female.    Chief Complaint: Pre-op Exam      HPI:  Pat Yung is a 54 y.o. female who presents for pre-op single vessel CABG. She has a medical history significant for hypertension, hyperlipidemia, gastroesophageal reflux disease.  She is referred by Dr. Delaney.  She had chest pain that prompted a cardiac evaluation.  During this evaluation, her troponin was elevated to 0.036 and her stress test was abnormal which prompted further investigation.  A LHC was done which demonstrated Ost LAD to Prox LAD lesion was 99% stenosed. She referred to discuss surgical revascularization.       Family and social history reviewed    Review of patient's allergies indicates:   Allergen Reactions    Nexium [esomeprazole magnesium] Hives     Patient says that began having "break-outs" on her abdomen    Lisinopril Other (See Comments)     Cough      Past Medical History:   Diagnosis Date    GERD (gastroesophageal reflux disease)     Herpes simplex without mention of complication     Hyperlipidemia     Hypertension     Ulcer     Vitamin D deficiency      Past Surgical History:   Procedure Laterality Date    ANGIOGRAM, CORONARY ARTERY - Right Right 2025     SECTION  1998    x 1    COLONOSCOPY N/A 2021    Procedure: COLONOSCOPY;  Surgeon: Jesu Canas MD;  Location: [facility] ENDO;  Service: Endoscopy;  Laterality: N/A;  covid  stbernard -ml    CORONARY ANGIOGRAPHY Right 2025    Procedure: ANGIOGRAM, CORONARY ARTERY;  Surgeon: Mauro Delaney MD;  Location: [facility] CATH LAB;  Service: Cardiology;  Laterality: Right;    ESOPHAGOGASTRODUODENOSCOPY  2022    ESOPHAGOGASTRODUODENOSCOPY N/A 2022    Procedure: EGD (ESOPHAGOGASTRODUODENOSCOPY);  Surgeon: Bhupendra Delacruz MD;  Location: [facility] ENDO;  Service: Endoscopy;  Laterality: N/A;    TUBAL LIGATION       Family History       Problem Relation (Age of Onset)    COPD Maternal " Grandfather    Diabetes Maternal Grandmother    Heart disease Maternal Grandmother, Maternal Grandfather    Hyperlipidemia Maternal Grandfather    Hypertension Maternal Grandfather    Kidney disease Maternal Grandmother    Sarcoidosis Mother    Stroke Maternal Grandmother, Maternal Grandfather    Vision loss Maternal Grandmother, Paternal Grandmother    Vitiligo Mother          Social History[1]    Current medications Reviewed    Review of Systems   Constitutional:  Negative for activity change.   HENT:  Negative for nosebleeds.    Eyes:  Negative for visual disturbance.   Respiratory:  Negative for shortness of breath.    Cardiovascular:  Negative for chest pain.   Gastrointestinal:  Negative for nausea.   Musculoskeletal:  Negative for gait problem.   Skin:  Negative for color change.   Neurological:  Negative for dizziness and seizures.   Hematological:  Does not bruise/bleed easily.   Psychiatric/Behavioral:  Negative for sleep disturbance.      Objective:   Physical Exam  Constitutional:       General: She is not in acute distress.  HENT:      Head: Normocephalic and atraumatic.   Eyes:      Conjunctiva/sclera: Conjunctivae normal.   Cardiovascular:      Rate and Rhythm: Normal rate.   Pulmonary:      Effort: Pulmonary effort is normal. No respiratory distress.   Musculoskeletal:         General: Normal range of motion.      Cervical back: Normal range of motion.   Skin:     Coloration: Skin is not pale.   Neurological:      General: No focal deficit present.      Mental Status: She is alert.   Psychiatric:         Mood and Affect: Mood normal.         Behavior: Behavior normal.         Diagnostic Results: reviewed   Carotid US 2/20/25  RIGHT SIDE:   Normal - No evidence of plaque in the right internal carotid artery.   Antegrade flow in the right vertebral artery.     LEFT SIDE:   Normal - No evidence of plaque in the left internal carotid artery.   Antegrade flow in the left vertebral artery.     LHC:  2/5/2025    The Ost LAD to Prox LAD lesion was 99% stenosed.    Refer for CABG evaluation.     Nuc Med: 12/18/2024  Impression:  1. Medium in size moderate fixed defect in the apex and anteroseptal wall with recent elevated troponins from 10/24 likely represent fixed coronary artery disease.  Attention to follow.  No scintigraphic evidence of exercise or pharmacological stress induced myocardial ischemic changes  2. The global left ventricular systolic function is normal with an LV ejection fraction of 51 % and no evidence of LV dilatation. Wall motion is normal.  This report was flagged in Epic as abnormal.     ECHO: 10/29/2024    Left Ventricle: The left ventricle is normal in size. Ventricular mass is normal. Normal wall thickness. There is concentric remodeling. There is normal systolic function. Ejection fraction is approximately 60%. There is normal diastolic function. Elevated left ventricular filling pressure. Tissue Doppler velocity is reduced.    Right Ventricle: Normal right ventricular cavity size. Wall thickness is normal. Systolic function is normal.    Tricuspid Valve: There is mild regurgitation with a centrally directed jet.    Pulmonary Artery: There is borderline elevated pulmonary hypertension. The estimated pulmonary artery systolic pressure is 34 mmHg.    IVC/SVC: Normal venous pressure at 3 mmHg.  Assessment:   CAD  Plan:   Pre-op orders placed and consents obtained   CTS Attending Note:    I have personally taken the history and examined this patient and agree with the JOSHUA's note as stated above.  54-year-old woman with single-vessel coronary artery disease.  We plan single-vessel bypass, with left internal mammary artery to left anterior descending.  Her questions have been answered, and she wishes to proceed.       [1]   Social History  Socioeconomic History    Marital status:    Tobacco Use    Smoking status: Never    Smokeless tobacco: Never   Substance and Sexual Activity     Alcohol use: Not Currently     Alcohol/week: 1.0 standard drink of alcohol     Types: 1 Glasses of wine per week     Comment: This is Sometimes with guest or family every two months    Drug use: No    Sexual activity: Yes     Partners: Male     Birth control/protection: None     Social Drivers of Health     Financial Resource Strain: High Risk (1/15/2025)    Overall Financial Resource Strain (CARDIA)     Difficulty of Paying Living Expenses: Hard   Food Insecurity: Food Insecurity Present (1/15/2025)    Hunger Vital Sign     Worried About Running Out of Food in the Last Year: Sometimes true     Ran Out of Food in the Last Year: Often true   Transportation Needs: Patient Declined (10/28/2024)    TRANSPORTATION NEEDS     Transportation : Patient declined   Physical Activity: Insufficiently Active (1/15/2025)    Exercise Vital Sign     Days of Exercise per Week: 7 days     Minutes of Exercise per Session: 10 min   Stress: Stress Concern Present (1/15/2025)    [employer] - Occupational Stress Questionnaire     Feeling of Stress : To some extent   Housing Stability: Patient Declined (1/15/2025)    Housing Stability Vital Sign     Unable to Pay for Housing in the Last Year: Patient declined     Homeless in the Last Year: Patient declined

## 2025-02-20 NOTE — H&P (VIEW-ONLY)
"Subjective:      Patient ID: Pat Yung is a 54 y.o. female.    Chief Complaint: Pre-op Exam      HPI:  Pat Yung is a 54 y.o. female who presents for pre-op single vessel CABG. She has a medical history significant for hypertension, hyperlipidemia, gastroesophageal reflux disease.  She is referred by Dr. Delaney.  She had chest pain that prompted a cardiac evaluation.  During this evaluation, her troponin was elevated to 0.036 and her stress test was abnormal which prompted further investigation.  A LHC was done which demonstrated Ost LAD to Prox LAD lesion was 99% stenosed. She referred to discuss surgical revascularization.       Family and social history reviewed    Review of patient's allergies indicates:   Allergen Reactions    Nexium [esomeprazole magnesium] Hives     Patient says that began having "break-outs" on her abdomen    Lisinopril Other (See Comments)     Cough      Past Medical History:   Diagnosis Date    GERD (gastroesophageal reflux disease)     Herpes simplex without mention of complication     Hyperlipidemia     Hypertension     Ulcer     Vitamin D deficiency      Past Surgical History:   Procedure Laterality Date    ANGIOGRAM, CORONARY ARTERY - Right Right 2025     SECTION  1998    x 1    COLONOSCOPY N/A 2021    Procedure: COLONOSCOPY;  Surgeon: Jesu Canas MD;  Location: [facility] ENDO;  Service: Endoscopy;  Laterality: N/A;  covid  stbernard -ml    CORONARY ANGIOGRAPHY Right 2025    Procedure: ANGIOGRAM, CORONARY ARTERY;  Surgeon: Mauro Delaney MD;  Location: [facility] CATH LAB;  Service: Cardiology;  Laterality: Right;    ESOPHAGOGASTRODUODENOSCOPY  2022    ESOPHAGOGASTRODUODENOSCOPY N/A 2022    Procedure: EGD (ESOPHAGOGASTRODUODENOSCOPY);  Surgeon: Bhupendra Delacruz MD;  Location: [facility] ENDO;  Service: Endoscopy;  Laterality: N/A;    TUBAL LIGATION       Family History       Problem Relation (Age of Onset)    COPD Maternal " Grandfather    Diabetes Maternal Grandmother    Heart disease Maternal Grandmother, Maternal Grandfather    Hyperlipidemia Maternal Grandfather    Hypertension Maternal Grandfather    Kidney disease Maternal Grandmother    Sarcoidosis Mother    Stroke Maternal Grandmother, Maternal Grandfather    Vision loss Maternal Grandmother, Paternal Grandmother    Vitiligo Mother          Social History[1]    Current medications Reviewed    Review of Systems   Constitutional:  Negative for activity change.   HENT:  Negative for nosebleeds.    Eyes:  Negative for visual disturbance.   Respiratory:  Negative for shortness of breath.    Cardiovascular:  Negative for chest pain.   Gastrointestinal:  Negative for nausea.   Musculoskeletal:  Negative for gait problem.   Skin:  Negative for color change.   Neurological:  Negative for dizziness and seizures.   Hematological:  Does not bruise/bleed easily.   Psychiatric/Behavioral:  Negative for sleep disturbance.      Objective:   Physical Exam  Constitutional:       General: She is not in acute distress.  HENT:      Head: Normocephalic and atraumatic.   Eyes:      Conjunctiva/sclera: Conjunctivae normal.   Cardiovascular:      Rate and Rhythm: Normal rate.   Pulmonary:      Effort: Pulmonary effort is normal. No respiratory distress.   Musculoskeletal:         General: Normal range of motion.      Cervical back: Normal range of motion.   Skin:     Coloration: Skin is not pale.   Neurological:      General: No focal deficit present.      Mental Status: She is alert.   Psychiatric:         Mood and Affect: Mood normal.         Behavior: Behavior normal.         Diagnostic Results: reviewed   Carotid US 2/20/25  RIGHT SIDE:   Normal - No evidence of plaque in the right internal carotid artery.   Antegrade flow in the right vertebral artery.     LEFT SIDE:   Normal - No evidence of plaque in the left internal carotid artery.   Antegrade flow in the left vertebral artery.     LHC:  2/5/2025    The Ost LAD to Prox LAD lesion was 99% stenosed.    Refer for CABG evaluation.     Nuc Med: 12/18/2024  Impression:  1. Medium in size moderate fixed defect in the apex and anteroseptal wall with recent elevated troponins from 10/24 likely represent fixed coronary artery disease.  Attention to follow.  No scintigraphic evidence of exercise or pharmacological stress induced myocardial ischemic changes  2. The global left ventricular systolic function is normal with an LV ejection fraction of 51 % and no evidence of LV dilatation. Wall motion is normal.  This report was flagged in Epic as abnormal.     ECHO: 10/29/2024    Left Ventricle: The left ventricle is normal in size. Ventricular mass is normal. Normal wall thickness. There is concentric remodeling. There is normal systolic function. Ejection fraction is approximately 60%. There is normal diastolic function. Elevated left ventricular filling pressure. Tissue Doppler velocity is reduced.    Right Ventricle: Normal right ventricular cavity size. Wall thickness is normal. Systolic function is normal.    Tricuspid Valve: There is mild regurgitation with a centrally directed jet.    Pulmonary Artery: There is borderline elevated pulmonary hypertension. The estimated pulmonary artery systolic pressure is 34 mmHg.    IVC/SVC: Normal venous pressure at 3 mmHg.  Assessment:   CAD  Plan:   Pre-op orders placed and consents obtained   CTS Attending Note:    I have personally taken the history and examined this patient and agree with the JOSHUA's note as stated above.  54-year-old woman with single-vessel coronary artery disease.  We plan single-vessel bypass, with left internal mammary artery to left anterior descending.  Her questions have been answered, and she wishes to proceed.       [1]   Social History  Socioeconomic History    Marital status:    Tobacco Use    Smoking status: Never    Smokeless tobacco: Never   Substance and Sexual Activity     Alcohol use: Not Currently     Alcohol/week: 1.0 standard drink of alcohol     Types: 1 Glasses of wine per week     Comment: This is Sometimes with guest or family every two months    Drug use: No    Sexual activity: Yes     Partners: Male     Birth control/protection: None     Social Drivers of Health     Financial Resource Strain: High Risk (1/15/2025)    Overall Financial Resource Strain (CARDIA)     Difficulty of Paying Living Expenses: Hard   Food Insecurity: Food Insecurity Present (1/15/2025)    Hunger Vital Sign     Worried About Running Out of Food in the Last Year: Sometimes true     Ran Out of Food in the Last Year: Often true   Transportation Needs: Patient Declined (10/28/2024)    TRANSPORTATION NEEDS     Transportation : Patient declined   Physical Activity: Insufficiently Active (1/15/2025)    Exercise Vital Sign     Days of Exercise per Week: 7 days     Minutes of Exercise per Session: 10 min   Stress: Stress Concern Present (1/15/2025)    [employer] - Occupational Stress Questionnaire     Feeling of Stress : To some extent   Housing Stability: Patient Declined (1/15/2025)    Housing Stability Vital Sign     Unable to Pay for Housing in the Last Year: Patient declined     Homeless in the Last Year: Patient declined

## 2025-02-20 NOTE — PATIENT INSTRUCTIONS
"Pt verbally instructed and written instructions given for surgery.      PREPARING FOR SURGERY    Your surgery has been scheduled for:    Day: Monday   Date:  February 24    Arrival Time: 1000    Start Time: 1200    You should report to the second floor surgery center, located on the [facility] side of the second floor of the [facility]. The phone number is [phone].         PLEASE NOTE    If you are allergic to any medications, please inform your doctor or the nurse responsible for your care.  Tell the doctor if you take aspirin, products containing aspirin, herbal medications or blood thinners, such as Coumadin, Pradaxa, or Plavix.  Notify your doctor if you are diabetic and provide information about the medications you take.  Arrange for someone to drive you home following surgery. You will not be allowed to leave the surgical facility alone or drive yourself home following sedation and anesthesia.  If you have not already done so, please bring a list of your medications with you the day of your surgery.          THE NIGHT BEFORE SURGERY    Eat a light supper on the night before your surgery, no greasy or fatty foods.    DO NOT EAT OR DRINK ANYTHING AFTER MIDNIGHT, INCLUDING GUM, HARD CANDY, MINTS, OR CHEWING TOBACCO    Take a complete shower. Wash your body from the neck down with Hibiclens (chlorhexidine gluconate) soap. Hibiclens soap may be purchased over the counter at the pharmacy. Keep the soap away from your eyes, ears, and mouth. After washing with Hibiclens, rinse thoroughly. You may also use any soap labeled "antibacterial". Shampoo your hair with your regular shampoo.         THE DAY OF SURGERY    Take another shower with Hibiclens or any antibacterial soap, to reduce the change of infection.    Medications to take the morning of surgery: none.     Diabetic medication instructions: n/a    You may brush your teeth and rinse your mouth, but do not swallow any water.    Do not apply " perfume, powder, body lotions or deodorant on the day of surgery.    Do not wear any makeup, including mascara and false eyelashes.    Nail polish should be removed.    Wear comfortable clothes, such as button front shirt and loose-fitting pants.    Leave all jewelry, including body piercings and valuables at home.    Hairpins and clasps must be removed before you enter the operating room.    You may wear glasses, dentures and hearing aids before and after surgery. They may need to be removed before going into the operating room. Contact lenses worn before surgery must be removed before entering the operating room. Please bring a case for your hearing aids, glasses and/or contacts.    Bring any devices you will need after surgery such as crutches or canes.    If you have sleep apnea, please bring your CPAP machine.    If you have an implantable device, such as a pacemaker or AICD, please bring the device information card, if you have one.       If you have any questions or concerns, please don't hesitate to call.

## 2025-02-21 ENCOUNTER — TELEPHONE (OUTPATIENT)
Dept: CARDIOTHORACIC SURGERY | Facility: CLINIC | Age: 55
End: 2025-02-21
Payer: COMMERCIAL

## 2025-02-21 ENCOUNTER — ANESTHESIA EVENT (OUTPATIENT)
Dept: SURGERY | Facility: HOSPITAL | Age: 55
End: 2025-02-21
Payer: COMMERCIAL

## 2025-02-21 NOTE — TELEPHONE ENCOUNTER
Called pt and informed her of arrival time for surgery.  Pt instructed to report to [facility] at 9:00 Monday morning per Dr. Nugent.  Pt reminded to perform Hibiclens shower Sunday night and Monday morning, and to become NPO at midnight before surgery.

## 2025-02-24 ENCOUNTER — HOSPITAL ENCOUNTER (INPATIENT)
Facility: HOSPITAL | Age: 55
LOS: 4 days | Discharge: HOME | DRG: 235 | End: 2025-02-28
Attending: THORACIC SURGERY (CARDIOTHORACIC VASCULAR SURGERY) | Admitting: THORACIC SURGERY (CARDIOTHORACIC VASCULAR SURGERY)
Payer: COMMERCIAL

## 2025-02-24 ENCOUNTER — ANESTHESIA (OUTPATIENT)
Dept: SURGERY | Facility: HOSPITAL | Age: 55
End: 2025-02-24
Payer: COMMERCIAL

## 2025-02-24 DIAGNOSIS — Z98.890 HISTORY OF HEART SURGERY: ICD-10-CM

## 2025-02-24 DIAGNOSIS — I49.9 ARRHYTHMIA: ICD-10-CM

## 2025-02-24 DIAGNOSIS — Z95.1 S/P CABG (CORONARY ARTERY BYPASS GRAFT): Primary | ICD-10-CM

## 2025-02-24 DIAGNOSIS — I10 PRIMARY HYPERTENSION: ICD-10-CM

## 2025-02-24 DIAGNOSIS — I25.10 CAD (CORONARY ARTERY DISEASE): ICD-10-CM

## 2025-02-24 LAB
ABO + RH BLD: NORMAL
ALBUMIN SERPL BCP-MCNC: 2.6 G/DL (ref 3.5–5.2)
ALLENS TEST: ABNORMAL
ALP SERPL-CCNC: 69 U/L (ref 40–150)
ALT SERPL W/O P-5'-P-CCNC: 15 U/L (ref 10–44)
ANION GAP SERPL CALC-SCNC: 9 MMOL/L (ref 8–16)
APTT PPP: 25.6 SEC (ref 21–32)
AST SERPL-CCNC: 31 U/L (ref 10–40)
BILIRUB SERPL-MCNC: 0.4 MG/DL (ref 0.1–1)
BLD GP AB SCN CELLS X3 SERPL QL: NORMAL
BUN SERPL-MCNC: 10 MG/DL (ref 6–20)
CALCIUM SERPL-MCNC: 7.9 MG/DL (ref 8.7–10.5)
CHLORIDE SERPL-SCNC: 112 MMOL/L (ref 95–110)
CO2 SERPL-SCNC: 19 MMOL/L (ref 23–29)
CREAT SERPL-MCNC: 0.6 MG/DL (ref 0.5–1.4)
DELSYS: ABNORMAL
ERYTHROCYTE [SEDIMENTATION RATE] IN BLOOD BY WESTERGREN METHOD: 16 MM/H
EST. GFR  (NO RACE VARIABLE): >60 ML/MIN/1.73 M^2
FIO2: 100
GLUCOSE SERPL-MCNC: 116 MG/DL (ref 70–110)
GLUCOSE SERPL-MCNC: 157 MG/DL (ref 70–110)
GLUCOSE SERPL-MCNC: 160 MG/DL (ref 70–110)
GLUCOSE SERPL-MCNC: 163 MG/DL (ref 70–110)
GLUCOSE SERPL-MCNC: 178 MG/DL (ref 70–110)
GLUCOSE SERPL-MCNC: 187 MG/DL (ref 70–110)
HCO3 UR-SCNC: 21.2 MMOL/L (ref 24–28)
HCO3 UR-SCNC: 23 MMOL/L (ref 24–28)
HCO3 UR-SCNC: 23.1 MMOL/L (ref 24–28)
HCO3 UR-SCNC: 23.4 MMOL/L (ref 24–28)
HCO3 UR-SCNC: 23.5 MMOL/L (ref 24–28)
HCO3 UR-SCNC: 25 MMOL/L (ref 24–28)
HCT VFR BLD CALC: 23 %PCV (ref 36–54)
HCT VFR BLD CALC: 25 %PCV (ref 36–54)
HCT VFR BLD CALC: 27 %PCV (ref 36–54)
HCT VFR BLD CALC: 30 %PCV (ref 36–54)
HCT VFR BLD CALC: 31 %PCV (ref 36–54)
HCT VFR BLD CALC: 33 %PCV (ref 36–54)
INR PPP: 1.1 (ref 0.8–1.2)
LDH SERPL L TO P-CCNC: 0.48 MMOL/L (ref 0.36–1.25)
LDH SERPL L TO P-CCNC: 1.72 MMOL/L (ref 0.36–1.25)
MAGNESIUM SERPL-MCNC: 2.3 MG/DL (ref 1.6–2.6)
MODE: ABNORMAL
PCO2 BLDA: 34.4 MMHG (ref 35–45)
PCO2 BLDA: 36.3 MMHG (ref 35–45)
PCO2 BLDA: 36.6 MMHG (ref 35–45)
PCO2 BLDA: 38.2 MMHG (ref 35–45)
PCO2 BLDA: 41.8 MMHG (ref 35–45)
PCO2 BLDA: 42.8 MMHG (ref 35–45)
PEEP: 5
PH SMN: 7.34 [PH] (ref 7.35–7.45)
PH SMN: 7.37 [PH] (ref 7.35–7.45)
PH SMN: 7.38 [PH] (ref 7.35–7.45)
PH SMN: 7.39 [PH] (ref 7.35–7.45)
PH SMN: 7.42 [PH] (ref 7.35–7.45)
PH SMN: 7.44 [PH] (ref 7.35–7.45)
PHOSPHATE SERPL-MCNC: 1.9 MG/DL (ref 2.7–4.5)
PO2 BLDA: 206 MMHG (ref 80–100)
PO2 BLDA: 289 MMHG (ref 80–100)
PO2 BLDA: 338 MMHG (ref 80–100)
PO2 BLDA: 392 MMHG (ref 80–100)
PO2 BLDA: 402 MMHG (ref 80–100)
PO2 BLDA: 406 MMHG (ref 80–100)
POC BE: -1 MMOL/L
POC BE: -1 MMOL/L
POC BE: -2 MMOL/L
POC BE: -3 MMOL/L
POC BE: -4 MMOL/L
POC BE: 0 MMOL/L
POC IONIZED CALCIUM: 1.02 MMOL/L (ref 1.06–1.42)
POC IONIZED CALCIUM: 1.02 MMOL/L (ref 1.06–1.42)
POC IONIZED CALCIUM: 1.08 MMOL/L (ref 1.06–1.42)
POC IONIZED CALCIUM: 1.11 MMOL/L (ref 1.06–1.42)
POC IONIZED CALCIUM: 1.19 MMOL/L (ref 1.06–1.42)
POC IONIZED CALCIUM: 1.28 MMOL/L (ref 1.06–1.42)
POC SATURATED O2: 100 % (ref 95–100)
POC TCO2: 22 MMOL/L (ref 23–27)
POC TCO2: 24 MMOL/L (ref 23–27)
POC TCO2: 25 MMOL/L (ref 23–27)
POC TCO2: 26 MMOL/L (ref 23–27)
POCT GLUCOSE: 135 MG/DL (ref 70–110)
POCT GLUCOSE: 163 MG/DL (ref 70–110)
POCT GLUCOSE: 184 MG/DL (ref 70–110)
POTASSIUM BLD-SCNC: 3.2 MMOL/L (ref 3.5–5.1)
POTASSIUM BLD-SCNC: 3.7 MMOL/L (ref 3.5–5.1)
POTASSIUM BLD-SCNC: 3.9 MMOL/L (ref 3.5–5.1)
POTASSIUM BLD-SCNC: 4 MMOL/L (ref 3.5–5.1)
POTASSIUM BLD-SCNC: 4.3 MMOL/L (ref 3.5–5.1)
POTASSIUM BLD-SCNC: 4.4 MMOL/L (ref 3.5–5.1)
POTASSIUM SERPL-SCNC: 3.3 MMOL/L (ref 3.5–5.1)
PROT SERPL-MCNC: 5.8 G/DL (ref 6–8.4)
PROTHROMBIN TIME: 12 SEC (ref 9–12.5)
SAMPLE: ABNORMAL
SAMPLE: NORMAL
SITE: ABNORMAL
SODIUM BLD-SCNC: 139 MMOL/L (ref 136–145)
SODIUM BLD-SCNC: 139 MMOL/L (ref 136–145)
SODIUM BLD-SCNC: 140 MMOL/L (ref 136–145)
SODIUM BLD-SCNC: 140 MMOL/L (ref 136–145)
SODIUM BLD-SCNC: 141 MMOL/L (ref 136–145)
SODIUM BLD-SCNC: 141 MMOL/L (ref 136–145)
SODIUM SERPL-SCNC: 140 MMOL/L (ref 136–145)
SP02: 100
SPECIMEN OUTDATE: NORMAL
VT: 470

## 2025-02-24 PROCEDURE — 63600175 PHARM REV CODE 636 W HCPCS: Performed by: STUDENT IN AN ORGANIZED HEALTH CARE EDUCATION/TRAINING PROGRAM

## 2025-02-24 PROCEDURE — 20000000 HC ICU ROOM

## 2025-02-24 PROCEDURE — 93312 ECHO TRANSESOPHAGEAL: CPT | Mod: 26,59,, | Performed by: ANESTHESIOLOGY

## 2025-02-24 PROCEDURE — 84295 ASSAY OF SERUM SODIUM: CPT

## 2025-02-24 PROCEDURE — 25000003 PHARM REV CODE 250: Performed by: PHYSICIAN ASSISTANT

## 2025-02-24 PROCEDURE — 27100088 HC CELL SAVER

## 2025-02-24 PROCEDURE — 37000008 HC ANESTHESIA 1ST 15 MINUTES: Performed by: THORACIC SURGERY (CARDIOTHORACIC VASCULAR SURGERY)

## 2025-02-24 PROCEDURE — 85520 HEPARIN ASSAY: CPT

## 2025-02-24 PROCEDURE — 93010 ELECTROCARDIOGRAM REPORT: CPT | Mod: ,,, | Performed by: INTERNAL MEDICINE

## 2025-02-24 PROCEDURE — 82803 BLOOD GASES ANY COMBINATION: CPT

## 2025-02-24 PROCEDURE — 63600175 PHARM REV CODE 636 W HCPCS: Performed by: THORACIC SURGERY (CARDIOTHORACIC VASCULAR SURGERY)

## 2025-02-24 PROCEDURE — 27201037 HC PRESSURE MONITORING SET UP

## 2025-02-24 PROCEDURE — 86901 BLOOD TYPING SEROLOGIC RH(D): CPT | Performed by: ANESTHESIOLOGY

## 2025-02-24 PROCEDURE — 84100 ASSAY OF PHOSPHORUS: CPT | Performed by: PHYSICIAN ASSISTANT

## 2025-02-24 PROCEDURE — 25000003 PHARM REV CODE 250: Performed by: STUDENT IN AN ORGANIZED HEALTH CARE EDUCATION/TRAINING PROGRAM

## 2025-02-24 PROCEDURE — 37799 UNLISTED PX VASCULAR SURGERY: CPT

## 2025-02-24 PROCEDURE — 25000003 PHARM REV CODE 250: Performed by: ANESTHESIOLOGY

## 2025-02-24 PROCEDURE — 94761 N-INVAS EAR/PLS OXIMETRY MLT: CPT | Mod: XB

## 2025-02-24 PROCEDURE — 5A1221Z PERFORMANCE OF CARDIAC OUTPUT, CONTINUOUS: ICD-10-PCS | Performed by: THORACIC SURGERY (CARDIOTHORACIC VASCULAR SURGERY)

## 2025-02-24 PROCEDURE — 93325 DOPPLER ECHO COLOR FLOW MAPG: CPT | Mod: 26,,, | Performed by: ANESTHESIOLOGY

## 2025-02-24 PROCEDURE — 37000009 HC ANESTHESIA EA ADD 15 MINS: Performed by: THORACIC SURGERY (CARDIOTHORACIC VASCULAR SURGERY)

## 2025-02-24 PROCEDURE — 85730 THROMBOPLASTIN TIME PARTIAL: CPT | Performed by: PHYSICIAN ASSISTANT

## 2025-02-24 PROCEDURE — 84132 ASSAY OF SERUM POTASSIUM: CPT

## 2025-02-24 PROCEDURE — 5A2204Z RESTORATION OF CARDIAC RHYTHM, SINGLE: ICD-10-PCS | Performed by: THORACIC SURGERY (CARDIOTHORACIC VASCULAR SURGERY)

## 2025-02-24 PROCEDURE — 27201423 OPTIME MED/SURG SUP & DEVICES STERILE SUPPLY: Performed by: THORACIC SURGERY (CARDIOTHORACIC VASCULAR SURGERY)

## 2025-02-24 PROCEDURE — 63600175 PHARM REV CODE 636 W HCPCS: Performed by: ANESTHESIOLOGY

## 2025-02-24 PROCEDURE — 93320 DOPPLER ECHO COMPLETE: CPT | Mod: 26,,, | Performed by: ANESTHESIOLOGY

## 2025-02-24 PROCEDURE — 80053 COMPREHEN METABOLIC PANEL: CPT | Performed by: STUDENT IN AN ORGANIZED HEALTH CARE EDUCATION/TRAINING PROGRAM

## 2025-02-24 PROCEDURE — 27200953 HC CARDIOPLEGIA SYSTEM

## 2025-02-24 PROCEDURE — 27000445 HC TEMPORARY PACEMAKER LEADS

## 2025-02-24 PROCEDURE — 27100171 HC OXYGEN HIGH FLOW UP TO 24 HOURS

## 2025-02-24 PROCEDURE — C9248 INJ, CLEVIDIPINE BUTYRATE: HCPCS | Mod: JZ,TB

## 2025-02-24 PROCEDURE — 27202608 HC CANNULA, MISC

## 2025-02-24 PROCEDURE — 27100026 HC SHUNT SENSOR, TERUMO

## 2025-02-24 PROCEDURE — 94002 VENT MGMT INPAT INIT DAY: CPT

## 2025-02-24 PROCEDURE — 85610 PROTHROMBIN TIME: CPT | Performed by: PHYSICIAN ASSISTANT

## 2025-02-24 PROCEDURE — 83605 ASSAY OF LACTIC ACID: CPT

## 2025-02-24 PROCEDURE — 02HK0JZ INSERTION OF PACEMAKER LEAD INTO RIGHT VENTRICLE, OPEN APPROACH: ICD-10-PCS | Performed by: THORACIC SURGERY (CARDIOTHORACIC VASCULAR SURGERY)

## 2025-02-24 PROCEDURE — C1729 CATH, DRAINAGE: HCPCS | Performed by: THORACIC SURGERY (CARDIOTHORACIC VASCULAR SURGERY)

## 2025-02-24 PROCEDURE — 63600175 PHARM REV CODE 636 W HCPCS: Performed by: PHYSICIAN ASSISTANT

## 2025-02-24 PROCEDURE — 5A1223Z PERFORMANCE OF CARDIAC PACING, CONTINUOUS: ICD-10-PCS | Performed by: THORACIC SURGERY (CARDIOTHORACIC VASCULAR SURGERY)

## 2025-02-24 PROCEDURE — 93005 ELECTROCARDIOGRAM TRACING: CPT

## 2025-02-24 PROCEDURE — 85014 HEMATOCRIT: CPT

## 2025-02-24 PROCEDURE — 82330 ASSAY OF CALCIUM: CPT

## 2025-02-24 PROCEDURE — 25000003 PHARM REV CODE 250: Performed by: THORACIC SURGERY (CARDIOTHORACIC VASCULAR SURGERY)

## 2025-02-24 PROCEDURE — 27000191 HC C-V MONITORING

## 2025-02-24 PROCEDURE — 02100Z9 BYPASS CORONARY ARTERY, ONE ARTERY FROM LEFT INTERNAL MAMMARY, OPEN APPROACH: ICD-10-PCS | Performed by: THORACIC SURGERY (CARDIOTHORACIC VASCULAR SURGERY)

## 2025-02-24 PROCEDURE — D9220A PRA ANESTHESIA: Mod: ,,, | Performed by: ANESTHESIOLOGY

## 2025-02-24 PROCEDURE — 85025 COMPLETE CBC W/AUTO DIFF WBC: CPT | Performed by: PHYSICIAN ASSISTANT

## 2025-02-24 PROCEDURE — 99900035 HC TECH TIME PER 15 MIN (STAT)

## 2025-02-24 PROCEDURE — 63600175 PHARM REV CODE 636 W HCPCS: Mod: JZ,TB

## 2025-02-24 PROCEDURE — 36000712 HC OR TIME LEV V 1ST 15 MIN: Performed by: THORACIC SURGERY (CARDIOTHORACIC VASCULAR SURGERY)

## 2025-02-24 PROCEDURE — 36000713 HC OR TIME LEV V EA ADD 15 MIN: Performed by: THORACIC SURGERY (CARDIOTHORACIC VASCULAR SURGERY)

## 2025-02-24 PROCEDURE — 83735 ASSAY OF MAGNESIUM: CPT | Performed by: PHYSICIAN ASSISTANT

## 2025-02-24 PROCEDURE — 27000175 HC ADULT BYPASS PUMP

## 2025-02-24 PROCEDURE — 86920 COMPATIBILITY TEST SPIN: CPT | Performed by: ANESTHESIOLOGY

## 2025-02-24 PROCEDURE — 82800 BLOOD PH: CPT

## 2025-02-24 PROCEDURE — 99900026 HC AIRWAY MAINTENANCE (STAT)

## 2025-02-24 RX ORDER — POTASSIUM CHLORIDE 29.8 MG/ML
40 INJECTION INTRAVENOUS
Status: DISCONTINUED | OUTPATIENT
Start: 2025-02-24 | End: 2025-02-25

## 2025-02-24 RX ORDER — TRANEXAMIC ACID 100 MG/ML
INJECTION, SOLUTION INTRAVENOUS
Status: DISCONTINUED | OUTPATIENT
Start: 2025-02-24 | End: 2025-02-24

## 2025-02-24 RX ORDER — NOREPINEPHRINE BITARTRATE 1 MG/ML
INJECTION, SOLUTION INTRAVENOUS
Status: DISCONTINUED | OUTPATIENT
Start: 2025-02-24 | End: 2025-02-24

## 2025-02-24 RX ORDER — ALBUMIN HUMAN 50 G/1000ML
25 SOLUTION INTRAVENOUS ONCE AS NEEDED
Status: DISCONTINUED | OUTPATIENT
Start: 2025-02-24 | End: 2025-02-28 | Stop reason: HOSPADM

## 2025-02-24 RX ORDER — PAPAVERINE HYDROCHLORIDE 30 MG/ML
INJECTION INTRAMUSCULAR; INTRAVENOUS
Status: DISCONTINUED | OUTPATIENT
Start: 2025-02-24 | End: 2025-02-24

## 2025-02-24 RX ORDER — ONDANSETRON HYDROCHLORIDE 2 MG/ML
4 INJECTION, SOLUTION INTRAVENOUS EVERY 12 HOURS PRN
Status: DISCONTINUED | OUTPATIENT
Start: 2025-02-24 | End: 2025-02-28 | Stop reason: HOSPADM

## 2025-02-24 RX ORDER — NAPROXEN SODIUM 220 MG/1
81 TABLET, FILM COATED ORAL ONCE
Status: COMPLETED | OUTPATIENT
Start: 2025-02-25 | End: 2025-02-24

## 2025-02-24 RX ORDER — POTASSIUM CHLORIDE 14.9 MG/ML
20 INJECTION INTRAVENOUS
Status: DISCONTINUED | OUTPATIENT
Start: 2025-02-24 | End: 2025-02-25

## 2025-02-24 RX ORDER — PROTAMINE SULFATE 10 MG/ML
INJECTION, SOLUTION INTRAVENOUS
Status: DISCONTINUED | OUTPATIENT
Start: 2025-02-24 | End: 2025-02-24

## 2025-02-24 RX ORDER — FENTANYL CITRATE 50 UG/ML
50 INJECTION, SOLUTION INTRAMUSCULAR; INTRAVENOUS
Refills: 0 | Status: DISCONTINUED | OUTPATIENT
Start: 2025-03-02 | End: 2025-02-25

## 2025-02-24 RX ORDER — LIDOCAINE HYDROCHLORIDE 10 MG/ML
1 INJECTION, SOLUTION EPIDURAL; INFILTRATION; INTRACAUDAL; PERINEURAL ONCE
Status: DISCONTINUED | OUTPATIENT
Start: 2025-02-24 | End: 2025-02-24

## 2025-02-24 RX ORDER — MUPIROCIN 20 MG/G
1 OINTMENT TOPICAL 2 TIMES DAILY
Status: DISCONTINUED | OUTPATIENT
Start: 2025-02-24 | End: 2025-02-28 | Stop reason: HOSPADM

## 2025-02-24 RX ORDER — TRANEXAMIC ACID 100 MG/ML
INJECTION, SOLUTION INTRAVENOUS CONTINUOUS PRN
Status: DISCONTINUED | OUTPATIENT
Start: 2025-02-24 | End: 2025-02-24

## 2025-02-24 RX ORDER — SODIUM CHLORIDE 0.9 % (FLUSH) 0.9 %
10 SYRINGE (ML) INJECTION
Status: DISCONTINUED | OUTPATIENT
Start: 2025-02-24 | End: 2025-02-28 | Stop reason: HOSPADM

## 2025-02-24 RX ORDER — CALCIUM CHLORIDE DIHYDRATE 100 MG/ML
INJECTION, SOLUTION INTRAVENOUS
Status: DISCONTINUED | OUTPATIENT
Start: 2025-02-24 | End: 2025-02-24

## 2025-02-24 RX ORDER — FAMOTIDINE 10 MG/ML
20 INJECTION INTRAVENOUS 2 TIMES DAILY
Status: DISCONTINUED | OUTPATIENT
Start: 2025-02-24 | End: 2025-02-26

## 2025-02-24 RX ORDER — SODIUM CHLORIDE 9 MG/ML
INJECTION, SOLUTION INTRAVENOUS CONTINUOUS
Status: DISCONTINUED | OUTPATIENT
Start: 2025-02-24 | End: 2025-02-24

## 2025-02-24 RX ORDER — BUPIVACAINE HYDROCHLORIDE 5 MG/ML
INJECTION, SOLUTION EPIDURAL; INTRACAUDAL
Status: COMPLETED | OUTPATIENT
Start: 2025-02-24 | End: 2025-02-24

## 2025-02-24 RX ORDER — HEPARIN SODIUM 1000 [USP'U]/ML
INJECTION, SOLUTION INTRAVENOUS; SUBCUTANEOUS
Status: DISCONTINUED | OUTPATIENT
Start: 2025-02-24 | End: 2025-02-24

## 2025-02-24 RX ORDER — DEXTROSE MONOHYDRATE, SODIUM CHLORIDE, AND POTASSIUM CHLORIDE 50; 1.49; 4.5 G/1000ML; G/1000ML; G/1000ML
INJECTION, SOLUTION INTRAVENOUS CONTINUOUS
Status: DISCONTINUED | OUTPATIENT
Start: 2025-02-24 | End: 2025-02-24

## 2025-02-24 RX ORDER — CEFAZOLIN SODIUM 1 G/3ML
INJECTION, POWDER, FOR SOLUTION INTRAMUSCULAR; INTRAVENOUS
Status: DISCONTINUED | OUTPATIENT
Start: 2025-02-24 | End: 2025-02-24

## 2025-02-24 RX ORDER — BUPIVACAINE HYDROCHLORIDE 2.5 MG/ML
INJECTION, SOLUTION EPIDURAL; INFILTRATION; INTRACAUDAL
Status: COMPLETED | OUTPATIENT
Start: 2025-02-24 | End: 2025-02-24

## 2025-02-24 RX ORDER — FENTANYL CITRATE 50 UG/ML
25 INJECTION, SOLUTION INTRAMUSCULAR; INTRAVENOUS
Refills: 0 | Status: DISCONTINUED | OUTPATIENT
Start: 2025-02-24 | End: 2025-02-25

## 2025-02-24 RX ORDER — MAGNESIUM SULFATE HEPTAHYDRATE 40 MG/ML
2 INJECTION, SOLUTION INTRAVENOUS
Status: DISCONTINUED | OUTPATIENT
Start: 2025-02-24 | End: 2025-02-27

## 2025-02-24 RX ORDER — IPRATROPIUM BROMIDE AND ALBUTEROL SULFATE 2.5; .5 MG/3ML; MG/3ML
3 SOLUTION RESPIRATORY (INHALATION) EVERY 4 HOURS PRN
Status: ACTIVE | OUTPATIENT
Start: 2025-02-24 | End: 2025-02-25

## 2025-02-24 RX ORDER — LIDOCAINE HYDROCHLORIDE 20 MG/ML
INJECTION, SOLUTION EPIDURAL; INFILTRATION; INTRACAUDAL; PERINEURAL
Status: DISCONTINUED | OUTPATIENT
Start: 2025-02-24 | End: 2025-02-24

## 2025-02-24 RX ORDER — FENTANYL CITRATE 50 UG/ML
INJECTION, SOLUTION INTRAMUSCULAR; INTRAVENOUS
Status: DISCONTINUED | OUTPATIENT
Start: 2025-02-24 | End: 2025-02-24

## 2025-02-24 RX ORDER — CEFAZOLIN 2 G/1
2 INJECTION, POWDER, FOR SOLUTION INTRAMUSCULAR; INTRAVENOUS
Status: COMPLETED | OUTPATIENT
Start: 2025-02-25 | End: 2025-02-26

## 2025-02-24 RX ORDER — IPRATROPIUM BROMIDE AND ALBUTEROL SULFATE 2.5; .5 MG/3ML; MG/3ML
3 SOLUTION RESPIRATORY (INHALATION) EVERY 4 HOURS
Status: COMPLETED | OUTPATIENT
Start: 2025-02-25 | End: 2025-02-25

## 2025-02-24 RX ORDER — LIDOCAINE HYDROCHLORIDE 10 MG/ML
1 INJECTION, SOLUTION EPIDURAL; INFILTRATION; INTRACAUDAL; PERINEURAL
Status: DISCONTINUED | OUTPATIENT
Start: 2025-02-24 | End: 2025-02-24

## 2025-02-24 RX ORDER — ACETAMINOPHEN 500 MG
1000 TABLET ORAL
Status: COMPLETED | OUTPATIENT
Start: 2025-02-24 | End: 2025-02-24

## 2025-02-24 RX ORDER — DOCUSATE SODIUM 50 MG/5ML
100 LIQUID ORAL ONCE
Status: COMPLETED | OUTPATIENT
Start: 2025-02-24 | End: 2025-02-24

## 2025-02-24 RX ORDER — NAPROXEN SODIUM 220 MG/1
81 TABLET, FILM COATED ORAL DAILY
Status: DISCONTINUED | OUTPATIENT
Start: 2025-02-25 | End: 2025-02-26

## 2025-02-24 RX ORDER — MIDAZOLAM HYDROCHLORIDE 1 MG/ML
INJECTION INTRAMUSCULAR; INTRAVENOUS
Status: DISCONTINUED | OUTPATIENT
Start: 2025-02-24 | End: 2025-02-24

## 2025-02-24 RX ORDER — BISACODYL 10 MG/1
10 SUPPOSITORY RECTAL DAILY PRN
Status: DISCONTINUED | OUTPATIENT
Start: 2025-02-24 | End: 2025-02-24

## 2025-02-24 RX ORDER — ESMOLOL HYDROCHLORIDE 10 MG/ML
INJECTION INTRAVENOUS
Status: DISCONTINUED | OUTPATIENT
Start: 2025-02-24 | End: 2025-02-24

## 2025-02-24 RX ORDER — SODIUM CHLORIDE 9 MG/ML
INJECTION, SOLUTION INTRAVENOUS CONTINUOUS PRN
Status: DISCONTINUED | OUTPATIENT
Start: 2025-02-24 | End: 2025-02-24

## 2025-02-24 RX ORDER — METOPROLOL TARTRATE 25 MG/1
25 TABLET, FILM COATED ORAL
Status: COMPLETED | OUTPATIENT
Start: 2025-02-24 | End: 2025-02-24

## 2025-02-24 RX ORDER — ROCURONIUM BROMIDE 10 MG/ML
INJECTION, SOLUTION INTRAVENOUS
Status: DISCONTINUED | OUTPATIENT
Start: 2025-02-24 | End: 2025-02-24

## 2025-02-24 RX ORDER — OXYCODONE HYDROCHLORIDE 5 MG/1
5 TABLET ORAL EVERY 4 HOURS PRN
Refills: 0 | Status: DISCONTINUED | OUTPATIENT
Start: 2025-02-24 | End: 2025-02-25

## 2025-02-24 RX ORDER — MUPIROCIN 20 MG/G
1 OINTMENT TOPICAL
Status: COMPLETED | OUTPATIENT
Start: 2025-02-24 | End: 2025-02-24

## 2025-02-24 RX ORDER — METOCLOPRAMIDE HYDROCHLORIDE 5 MG/ML
5 INJECTION INTRAMUSCULAR; INTRAVENOUS EVERY 6 HOURS PRN
Status: DISCONTINUED | OUTPATIENT
Start: 2025-02-24 | End: 2025-02-28 | Stop reason: HOSPADM

## 2025-02-24 RX ORDER — NAPROXEN SODIUM 220 MG/1
81 TABLET, FILM COATED ORAL ONCE
Status: DISCONTINUED | OUTPATIENT
Start: 2025-02-24 | End: 2025-02-24

## 2025-02-24 RX ORDER — KETAMINE HCL IN 0.9 % NACL 50 MG/5 ML
SYRINGE (ML) INTRAVENOUS
Status: DISCONTINUED | OUTPATIENT
Start: 2025-02-24 | End: 2025-02-24

## 2025-02-24 RX ORDER — NITROGLYCERIN 5 MG/ML
INJECTION, SOLUTION INTRAVENOUS
Status: DISCONTINUED | OUTPATIENT
Start: 2025-02-24 | End: 2025-02-24

## 2025-02-24 RX ORDER — ASPIRIN 300 MG/1
300 SUPPOSITORY RECTAL ONCE AS NEEDED
Status: DISCONTINUED | OUTPATIENT
Start: 2025-02-24 | End: 2025-02-24

## 2025-02-24 RX ORDER — OXYCODONE HYDROCHLORIDE 10 MG/1
10 TABLET ORAL EVERY 4 HOURS PRN
Refills: 0 | Status: DISCONTINUED | OUTPATIENT
Start: 2025-02-24 | End: 2025-02-25

## 2025-02-24 RX ORDER — PROPOFOL 10 MG/ML
0-50 INJECTION, EMULSION INTRAVENOUS CONTINUOUS
Status: DISCONTINUED | OUTPATIENT
Start: 2025-02-24 | End: 2025-02-25

## 2025-02-24 RX ORDER — PROPOFOL 10 MG/ML
VIAL (ML) INTRAVENOUS
Status: DISCONTINUED | OUTPATIENT
Start: 2025-02-24 | End: 2025-02-24

## 2025-02-24 RX ORDER — CEFAZOLIN 2 G/1
2 INJECTION, POWDER, FOR SOLUTION INTRAMUSCULAR; INTRAVENOUS
Status: DISCONTINUED | OUTPATIENT
Start: 2025-02-24 | End: 2025-02-24

## 2025-02-24 RX ORDER — ACETAMINOPHEN 325 MG/1
650 TABLET ORAL EVERY 4 HOURS PRN
Status: DISCONTINUED | OUTPATIENT
Start: 2025-02-24 | End: 2025-02-25

## 2025-02-24 RX ORDER — POLYETHYLENE GLYCOL 3350 17 G/17G
17 POWDER, FOR SOLUTION ORAL DAILY
Status: DISCONTINUED | OUTPATIENT
Start: 2025-02-25 | End: 2025-02-28 | Stop reason: HOSPADM

## 2025-02-24 RX ORDER — DOCUSATE SODIUM 100 MG/1
100 CAPSULE, LIQUID FILLED ORAL 2 TIMES DAILY
Status: DISCONTINUED | OUTPATIENT
Start: 2025-02-24 | End: 2025-02-28 | Stop reason: HOSPADM

## 2025-02-24 RX ORDER — POTASSIUM CHLORIDE 14.9 MG/ML
60 INJECTION INTRAVENOUS
Status: DISCONTINUED | OUTPATIENT
Start: 2025-02-24 | End: 2025-02-25

## 2025-02-24 RX ADMIN — CLEVIPIDINE 2 MG/HR: 0.5 EMULSION INTRAVENOUS at 11:02

## 2025-02-24 RX ADMIN — TRANEXAMIC ACID 1 MG/KG/HR: 100 INJECTION INTRAVENOUS at 03:02

## 2025-02-24 RX ADMIN — ROCURONIUM BROMIDE 50 MG: 10 INJECTION INTRAVENOUS at 03:02

## 2025-02-24 RX ADMIN — SODIUM CHLORIDE, SODIUM GLUCONATE, SODIUM ACETATE, POTASSIUM CHLORIDE, MAGNESIUM CHLORIDE, SODIUM PHOSPHATE, DIBASIC, AND POTASSIUM PHOSPHATE: .53; .5; .37; .037; .03; .012; .00082 INJECTION, SOLUTION INTRAVENOUS at 03:02

## 2025-02-24 RX ADMIN — ROCURONIUM BROMIDE 50 MG: 10 INJECTION INTRAVENOUS at 04:02

## 2025-02-24 RX ADMIN — NITROGLYCERIN 100 MCG: 5 INJECTION, SOLUTION INTRAVENOUS at 08:02

## 2025-02-24 RX ADMIN — TRANEXAMIC ACID 1000 MG: 100 INJECTION, SOLUTION INTRAVENOUS at 04:02

## 2025-02-24 RX ADMIN — SODIUM CHLORIDE: 9 INJECTION, SOLUTION INTRAVENOUS at 11:02

## 2025-02-24 RX ADMIN — MUPIROCIN 1 G: 20 OINTMENT TOPICAL at 10:02

## 2025-02-24 RX ADMIN — PROTAMINE SULFATE 5 MG: 10 INJECTION, SOLUTION INTRAVENOUS at 08:02

## 2025-02-24 RX ADMIN — CALCIUM CHLORIDE 1 G: 100 INJECTION, SOLUTION INTRAVENOUS at 08:02

## 2025-02-24 RX ADMIN — INSULIN HUMAN 2 UNITS/KG/HR: 1 INJECTION, SOLUTION INTRAVENOUS at 07:02

## 2025-02-24 RX ADMIN — BUPIVACAINE HYDROCHLORIDE 30 ML: 2.5 INJECTION, SOLUTION EPIDURAL; INFILTRATION; INTRACAUDAL; PERINEURAL at 04:02

## 2025-02-24 RX ADMIN — NOREPINEPHRINE BITARTRATE 8 MCG: 1 INJECTION, SOLUTION, CONCENTRATE INTRAVENOUS at 04:02

## 2025-02-24 RX ADMIN — CEFAZOLIN 2 G: 330 INJECTION, POWDER, FOR SOLUTION INTRAMUSCULAR; INTRAVENOUS at 04:02

## 2025-02-24 RX ADMIN — SODIUM CHLORIDE: 0.9 INJECTION, SOLUTION INTRAVENOUS at 03:02

## 2025-02-24 RX ADMIN — PROPOFOL 50 MCG/KG/MIN: 10 INJECTION, EMULSION INTRAVENOUS at 09:02

## 2025-02-24 RX ADMIN — METOPROLOL TARTRATE 25 MG: 25 TABLET, FILM COATED ORAL at 11:02

## 2025-02-24 RX ADMIN — MIDAZOLAM 2 MG: 1 INJECTION INTRAMUSCULAR; INTRAVENOUS at 03:02

## 2025-02-24 RX ADMIN — FENTANYL CITRATE 50 MCG: 50 INJECTION INTRAMUSCULAR; INTRAVENOUS at 03:02

## 2025-02-24 RX ADMIN — NOREPINEPHRINE BITARTRATE 0.04 MCG/KG/MIN: 1 INJECTION, SOLUTION, CONCENTRATE INTRAVENOUS at 03:02

## 2025-02-24 RX ADMIN — NOREPINEPHRINE BITARTRATE 8 MCG: 1 INJECTION, SOLUTION, CONCENTRATE INTRAVENOUS at 07:02

## 2025-02-24 RX ADMIN — DEXMEDETOMIDINE 0.25 MCG/KG/HR: 200 INJECTION, SOLUTION INTRAVENOUS at 03:02

## 2025-02-24 RX ADMIN — ROCURONIUM BROMIDE 20 MG: 10 INJECTION INTRAVENOUS at 05:02

## 2025-02-24 RX ADMIN — ACETAMINOPHEN 1000 MG: 500 TABLET ORAL at 11:02

## 2025-02-24 RX ADMIN — FENTANYL CITRATE 100 MCG: 50 INJECTION INTRAMUSCULAR; INTRAVENOUS at 08:02

## 2025-02-24 RX ADMIN — PROPOFOL 50 MG: 10 INJECTION, EMULSION INTRAVENOUS at 07:02

## 2025-02-24 RX ADMIN — DOCUSATE SODIUM LIQUID 100 MG: 100 LIQUID ORAL at 10:02

## 2025-02-24 RX ADMIN — INSULIN HUMAN 1 UNITS/HR: 1 INJECTION, SOLUTION INTRAVENOUS at 10:02

## 2025-02-24 RX ADMIN — BUPIVACAINE HYDROCHLORIDE 30 ML: 5 INJECTION, SOLUTION EPIDURAL; INTRACAUDAL; PERINEURAL at 03:02

## 2025-02-24 RX ADMIN — ROCURONIUM BROMIDE 20 MG: 10 INJECTION INTRAVENOUS at 07:02

## 2025-02-24 RX ADMIN — MUPIROCIN 1 G: 20 OINTMENT TOPICAL at 11:02

## 2025-02-24 RX ADMIN — PROPOFOL 150 MG: 10 INJECTION, EMULSION INTRAVENOUS at 03:02

## 2025-02-24 RX ADMIN — LIDOCAINE HYDROCHLORIDE 100 MG: 20 INJECTION, SOLUTION EPIDURAL; INFILTRATION; INTRACAUDAL at 03:02

## 2025-02-24 RX ADMIN — ROCURONIUM BROMIDE 30 MG: 10 INJECTION INTRAVENOUS at 07:02

## 2025-02-24 RX ADMIN — HEPARIN SODIUM 45000 UNITS: 1000 INJECTION, SOLUTION INTRAVENOUS; SUBCUTANEOUS at 07:02

## 2025-02-24 RX ADMIN — HEPARIN SODIUM 10000 UNITS: 1000 INJECTION, SOLUTION INTRAVENOUS; SUBCUTANEOUS at 07:02

## 2025-02-24 RX ADMIN — PROTAMINE SULFATE 390 MG: 10 INJECTION, SOLUTION INTRAVENOUS at 08:02

## 2025-02-24 RX ADMIN — ROCURONIUM BROMIDE 20 MG: 10 INJECTION INTRAVENOUS at 08:02

## 2025-02-24 RX ADMIN — Medication 30 MG: at 03:02

## 2025-02-24 RX ADMIN — FAMOTIDINE 20 MG: 10 INJECTION, SOLUTION INTRAVENOUS at 10:02

## 2025-02-24 RX ADMIN — ESMOLOL HYDROCHLORIDE 60 MG: 100 INJECTION, SOLUTION INTRAVENOUS at 03:02

## 2025-02-24 RX ADMIN — FENTANYL CITRATE 25 MCG: 50 INJECTION INTRAMUSCULAR; INTRAVENOUS at 10:02

## 2025-02-24 RX ADMIN — ASPIRIN 81 MG: 81 TABLET, CHEWABLE ORAL at 11:02

## 2025-02-24 RX ADMIN — Medication 20 MG: at 04:02

## 2025-02-24 RX ADMIN — LIDOCAINE HYDROCHLORIDE 100 MG: 20 INJECTION, SOLUTION EPIDURAL; INFILTRATION; INTRACAUDAL at 08:02

## 2025-02-24 RX ADMIN — EPINEPHRINE 0.02 MCG/KG/MIN: 1 INJECTION INTRAMUSCULAR; INTRAVENOUS; SUBCUTANEOUS at 08:02

## 2025-02-24 RX ADMIN — ROCURONIUM BROMIDE 30 MG: 10 INJECTION INTRAVENOUS at 05:02

## 2025-02-24 RX ADMIN — PROPOFOL 50 MG: 10 INJECTION, EMULSION INTRAVENOUS at 08:02

## 2025-02-24 RX ADMIN — CEFAZOLIN 2 G: 330 INJECTION, POWDER, FOR SOLUTION INTRAMUSCULAR; INTRAVENOUS at 08:02

## 2025-02-24 RX ADMIN — PROPOFOL 40 MCG/KG/MIN: 10 INJECTION, EMULSION INTRAVENOUS at 11:02

## 2025-02-24 NOTE — HPI
Pat Yung is a 54F with pmhx gerd, htn, hld and CAD. She had chest pain that prompted a cardiac evaluation. A LHC was done which demonstrated Ost LAD to Prox LAD lesion was 99% stenosed.     The patient presents to the SICU s/p CABG x1 (LIMA- LAD) with Dr. Nugent on 02/24/2025. On admission, they are intubated, sedated with propofol, and in stable condition. Inotropic and vasopressor requirements upon admission are 0.02 mcg/kg/min of epinephrine, maintain blood pressure at a MAP 60-80 and SBP < 140. Central access includes a RIJ CVC, arterial access includes a left radial arterial line. They also have L pleural and 2 mediastinal chest tubes with appropriate output.    Intraoperatively, they received 2L of crystalloid, 350 of cell saver, 0 PRBCs, 0 FFP, 0 platelets, and 0 cryoprecipitate. Urine output intraoperatively was 1.5Lcc. The pre-operative echocardiogram was notable for LVEF 40-50%, normal RV function, no significant valvular abnromalities. Post-operative echo was LVEF >55%, mild TR.    Immediate post-operative plans include hemodynamic stabilization and weaning of cardiac and respiratory support. Plan to wean vasopressors and inotropes as tolerated, wean ventilator support with goal of early extubation, and closely monitor fluid status with strict Is/Os and continued fluid resuscitation as needed.

## 2025-02-24 NOTE — ANESTHESIA PROCEDURE NOTES
Left Radial Arterial Line    Diagnosis: coronary artery disease  Doctor requesting consult: Dr. Nugent    Patient location during procedure: done in OR  Timeout: 2/24/2025 3:30 PM  Procedure end time: 2/24/2025 3:34 PM    Staffing  Authorizing Provider: Scottie Hines MD  Performing Provider: Olivier Park MD    Staffing  Performed by: Olivier Park MD  Authorized by: Scottie Hines MD    Anesthesiologist was present at the time of the procedure.    Preanesthetic Checklist  Completed: patient identified, IV checked, risks and benefits discussed, surgical consent, monitors and equipment checked, pre-op evaluation, timeout performed and anesthesia consent givenLeft Radial Arterial Line  Skin Prep: chlorhexidine gluconate and isopropyl alcohol  Local Infiltration: lidocaine  Orientation: left  Location: radial    Catheter Size: 20 G  Catheter placement by Ultrasound guidance. Heme positive aspiration all ports.   Vessel Caliber: medium, patent  Needle advanced into vessel with real time Ultrasound guidance.Insertion Attempts: 1  Assessment  Dressing: secured with tape and tegaderm  Patient: Tolerated well

## 2025-02-24 NOTE — ASSESSMENT & PLAN NOTE
S/p CABG x1 (RUANO-LAD) w/ Dr. Nugent on 2/24. Required Defib x2 for Vfib coming off pump. No issues since. Briefly on low dose levo + epi, admitted to SICU on 0.02 epi, stable.    Neuro/Psych:   - Sedation: propofol  - Pain:     - Scheduled Tylenol 1g q8h    - Fentanyl PRN while intubated, Oxy PRN                Cardiac:   - BP Goal: MAP 60-80  - Pressors/Inotropes: epi 0.02  - Rhythm: NSR  - Anti-HTNs: Resume as appropriate   - Beta blocker: Resume as appropriate    - Statin: Atorvastatin 40 mg QD    Pulmonary:   - Goal SpO2 >92%  - Will wean ventilator support as tolerated to extubate  - ABGs PRN  - Chest Tubes x 3 (2 Meds & 1 Pleural)      Renal:    - Maintain [place], record strict Is/Os  Recent Labs   Lab 02/20/25  1258   BUN 13   CREATININE 0.6       FEN / GI:     - Daily CMP, Mag/Phos     - Replace electrolytes as needed    - Nutrition: NPO pending extubation    - Bowel Regimen: Miralax, docusate    - Famotidine BID     ID:   - Afebrile  - Abx: Complete perioperative cefazolin 2g Q8H x 5 doses  Recent Labs   Lab 02/20/25  1258   WBC 10.29       Heme/Onc:   - Hgb 13.2 pre-operatively  - CBC, PTT/INR daily  - DVT ppx: SCDs  - ASA 325mg daily  Recent Labs   Lab 02/20/25  1258   HGB 13.2      APTT 27.1   INR 1.0       Endocrine:   - CTS Goal -140  - HgbA1c: 5.6%  - Endocrinology consulted for insulin management  - Insulin gtt     PPx:   Feeding: NPO pending extubation  Analgesia/Sedation: MMPC  Thromboembolic Prevention: SCDs  HOB >30: Yes  Stress Ulcer: famotidine BID  Glucose Control: Yes, insulin management per Endocrinology     Lines/Drains/Airway:  ETT  Left radial arterial line  RIJ CVC  [place]  Chest Tubes: 3 (2 Mediastinal, 1 Pleural)   Pacing Wires: Temporary ventricular pacing wires     Dispo/Code Status/Palliative:    - SICU   - Full Code

## 2025-02-24 NOTE — ANESTHESIA PROCEDURE NOTES
KANDIS    Diagnosis: Coronary artery disease involving native coronary artery of native heart with angina pectoris [I25.119]  Patient location during procedure: OR  Exam type: Baseline    Staffing  Performed: anesthesiologist     Anesthesiologist: Scottie Hines MD        Anesthesiologist Present  Yes      Setup & Induction  Patient preparation: bite block inserted  Probe Insertion: easy  Exam: completeDoppler Echo: 2D, continuous wave Doppler, pulse wave Doppler and color flow mapping.  Exam         LVAD:no  Estimated Ejection Fraction: 45-54% mild  Regional Wall Abnormalities: RWMA present            Right Heart  Right Ventricle: normal  Right Ventricle Function: normal    Intra Atrial Septum  PFO: no shunt by color flow doppler  no IAS aneurysm  no lipomatous hypertrophy  no Atrial Septal Defect (Asd)    Right Ventricle  Size: normal    Aortic Valve:  Stenosis: none.  Morphology: trileaflet    Regurgitation: no aortic valve regurgitation      Mitral Valve:   Morphology:normal  Jet Description: mild    Tricuspid Valve:  Morphology: normal  Regurgitation: mild    Pulmonic Valve:  Regurgitation(color flow): mild    Great Vessels  IABP: no  Aorta    Descending aorta IABP: no    Effusions none    SummaryFindings discussed with surgeon.    Other Findings   Diagnostic intraoperative KANDIS performed for CABG at the request of Dr. Nugent    Pre-bypass exam:  - Mildly decreased LV systolic function with estimated LVEF 40-50%  - Hypokinesis of the anterior and anterolateral walls  - Normal RV systolic function  - Trileaflet AV with no stenosis or regurgitation  - Mild MR, mild TR  - No effusions  - No dissection    Post-bypass exam:  - Epi at 0.02mcg/kg/min  - Improved LV systolic function with estimated LVEF >55%  - Normal RV systolic function  - Mild MR, no AI, no AS  - Moderate TR  - No effusions  - No dissection

## 2025-02-24 NOTE — ANESTHESIA PROCEDURE NOTES
Right IJ CVC Introducer    Diagnosis: Coronary Artery Disease  Doctor requesting consult: Dr. Nugent  Patient location during procedure: done in OR  Procedure Urgency: Routine  Procedure start time: 2/24/2025 3:41 PM  Timeout: 2/24/2025 3:40 PM  Procedure end time: 2/24/2025 3:50 PM      Staffing  Authorizing Provider: Scottie Hines MD  Performing Provider: Olivier Park MD    Staffing  Performed by: Olivier Park MD  Authorized by: Scottie Hines MD    Anesthesiologist was present at the time of the procedure.  Preanesthetic Checklist  Completed: patient identified, IV checked, risks and benefits discussed, surgical consent, monitors and equipment checked, pre-op evaluation, timeout performed and anesthesia consent given  Indication   Indication: hemodynamic monitoring, vascular access, med administration     Anesthesia   general anesthesia    Central Line   Skin Prep: skin prepped with ChloraPrep, skin prep agent completely dried prior to procedure  Sterile Barriers Followed: Yes    All five maximal barriers used- gloves, gown, cap, mask, and large sterile sheet    hand hygiene performed prior to central venous catheter insertion  Location: right internal jugular.   Catheter type: introducer  Catheter Size: 14 Fr  Inserted Catheter Length: 11.5 cm  Ultrasound: vascular probe with ultrasound   Vessel Caliber: large, patent, compressibility normal  Needle advanced into vessel with real time Ultrasound guidance.  Guidewire confirmed in vessel.  Image recorded and saved.  sterile gel and probe cover used in ultrasound-guided central venous catheter insertion   Manometry: Venous cannualation confirmed by visual estimation of blood vessel pressure using manometry.  Insertion Attempts: 1   Securement:line sutured, chlorhexidine patch, sterile dressing applied and blood return through all ports    Post-Procedure        Guidewire  Guidewire removed intact, verified with nurse.

## 2025-02-24 NOTE — ANESTHESIA PROCEDURE NOTES
Intubation    Date/Time: 2/24/2025 3:38 PM    Performed by: Olivier Park MD  Authorized by: Scottie Hines MD    Intubation:     Induction:  Intravenous    Intubated:  Postinduction    Mask Ventilation:  Easy with oral airway    Attempts:  1    Attempted By:  Resident anesthesiologist    Method of Intubation:  Video laryngoscopy    Blade:  [place] 3    Laryngeal View Grade: Grade I - full view of cords      Difficult Airway Encountered?: No      Complications:  None    Airway Device:  Oral endotracheal tube    Airway Device Size:  7.5    Style/Cuff Inflation:  Cuffed (inflated to minimal occlusive pressure)    Tube secured:  21    Secured at:  The lips    Placement Verified By:  Capnometry    Complicating Factors:  None    Findings Post-Intubation:  BS equal bilateral and atraumatic/condition of teeth unchanged

## 2025-02-24 NOTE — ANESTHESIA PROCEDURE NOTES
Bilateral External oblique blocks    Patient location during procedure: OR   Block not for primary anesthetic.  Reason for block: at surgeon's request and post-op pain management   Post-op Pain Location: Bilateral Chest Pain   Start time: 2/24/2025 4:01 PM  Timeout: 2/24/2025 4:00 PM   End time: 2/24/2025 4:05 PM    Staffing  Authorizing Provider: Scottie Hines MD  Performing Provider: Olivier Park MD    Staffing  Performed by: Olivier Park MD  Authorized by: Scottie Hines MD    Preanesthetic Checklist  Completed: patient identified, IV checked, site marked, risks and benefits discussed, surgical consent, monitors and equipment checked, pre-op evaluation and timeout performed  Peripheral Block  Patient position: supine  Prep: ChloraPrep  Patient monitoring: heart rate, cardiac monitor, continuous pulse ox, continuous capnometry and frequent blood pressure checks  Block type: Other (External Oblique)  Laterality: bilateral  Injection technique: single shot  Needle  Needle type: Stimuplex   Needle gauge: 22 G  Needle length: 2 in  Needle localization: ultrasound guidance and anatomical landmarks  Needle insertion depth: 1 cm  Catheter type: non-stimulating   -ultrasound image captured on disc.  Assessment  Injection assessment: negative aspiration, negative parasthesia and local visualized surrounding nerve  Paresthesia pain: none  Heart rate change: no  Slow fractionated injection: yes  Pain Tolerance: comfortable throughout block  Medications:    Medications: bupivacaine (pf) (MARCAINE) injection 0.25% - Perineural   30 mL - 2/24/2025 4:05:00 PM

## 2025-02-24 NOTE — ANESTHESIA PREPROCEDURE EVALUATION
[facility]  Anesthesia Pre-Operative Evaluation    Patient Name: Pat Yung  YOB: 1970  MRN: [id number]    SUBJECTIVE:     Pre-operative evaluation for Procedure(s) (LRB):  CORONARY ARTERY BYPASS GRAFT (CABG) (N/A)    02/23/2025    Pat Yung is a 54 y.o. female with HTN, HLD, GERD, and obstructive CAD now presenting for the above procedure(s).      Revised Cardiac Risk Index   1. History of ischemic heart disease   2. History of congestive heart failure   3. History of cerebrovascular disease (stroke or transient ischemic attack)   4. History of diabetes requiring preoperative insulin use   5. Chronic kidney disease (creatinine > 2 mg/dL)   6. Undergoing suprainguinal vascular, intraperitoneal, or intrathoracic surgery     Risk for cardiac death, nonfatal myocardial infarction, and nonfatal cardiac arrest     0 predictors = 3.9%, 1 predictor = 6.0%, 2 predictors = 10.1%, >=3 predictors = >15%    RCRI Calculator Class and Risk percentage:     1(6.0%)                          2D ECHO:  TTE:  Results for orders placed during the hospital encounter of 02/17/25    Echo    Interpretation Summary    Left Ventricle: The left ventricle is normal in size. Normal wall thickness. There is normal systolic function with a visually estimated ejection fraction of 60 - 65%. There is indeterminate diastolic function. Average E/e' ratio is 13.    Right Ventricle: Normal right ventricular cavity size. Wall thickness is normal. Systolic function is normal.    Aortic Valve: The aortic valve is a trileaflet valve. There is moderate aortic valve sclerosis.    Pulmonary Artery: The estimated pulmonary artery systolic pressure is 37 mmHg.    IVC/SVC: Normal venous pressure at 3 mmHg.      KANDIS:  No results found for this or any previous visit.    LDA: None documented.       Prev airway: None documented.    Drips: None documented.      Problem List[1]    Review of patient's allergies indicates:  "  Allergen Reactions    Nexium [esomeprazole magnesium] Hives     Patient says that began having "break-outs" on her abdomen    Lisinopril Other (See Comments)     Cough        Current Inpatient Medications:      Antibiotics (From admission, onward)      None            VTE Risk Mitigation (From admission, onward)      None            Medications Ordered Prior to Encounter[2]    Past Surgical History:   Procedure Laterality Date    ANGIOGRAM, CORONARY ARTERY - Right Right 2025     SECTION  1998    x 1    COLONOSCOPY N/A 2021    Procedure: COLONOSCOPY;  Surgeon: Jesu Canas MD;  Location: [facility] ENDO;  Service: Endoscopy;  Laterality: N/A;  covid  stbernard -ml    CORONARY ANGIOGRAPHY Right 2025    Procedure: ANGIOGRAM, CORONARY ARTERY;  Surgeon: Mauro Delaney MD;  Location: [facility] CATH LAB;  Service: Cardiology;  Laterality: Right;    ESOPHAGOGASTRODUODENOSCOPY  2022    ESOPHAGOGASTRODUODENOSCOPY N/A 2022    Procedure: EGD (ESOPHAGOGASTRODUODENOSCOPY);  Surgeon: Bhupendra Delacruz MD;  Location: [facility] ENDO;  Service: Endoscopy;  Laterality: N/A;    TUBAL LIGATION         Social History[3]    OBJECTIVE:     Vital Signs Range (Last 24H):         Significant Labs:  Lab Results   Component Value Date    WBC 10.29 2025    HGB 13.2 2025    HCT 40.5 2025     2025    CHOL 251 (H) 10/29/2024    TRIG 132 10/29/2024    HDL 57 10/29/2024    ALT 26 2025    AST 27 2025     2025    K 3.7 2025     2025    CREATININE 0.6 2025    BUN 13 2025    CO2 27 2025    TSH 0.684 2023    INR 1.0 2025    GLUF 78 2008    HGBA1C 5.6 2025       Diagnostic Studies: No relevant studies.    EKG:   Results for orders placed or performed during the hospital encounter of 25   EKG 12-lead    Collection Time: 25  3:09 PM   Result Value Ref Range    QRS Duration 84 ms    [facility] QTC " Calculation 457 ms    Narrative    Test Reason : I25.119,Z01.818,    Vent. Rate :  68 BPM     Atrial Rate :  68 BPM     P-R Int : 164 ms          QRS Dur :  84 ms      QT Int : 430 ms       P-R-T Axes :  38  10  34 degrees    QTcB Int : 457 ms    Normal sinus rhythm  Minimal voltage criteria for LVH, may be normal variant ( R in aVL )  Abnormal ECG  When compared with ECG of 12-Nov-2024 05:58,  No significant change was found  Confirmed by Shantell Mcnally ([id number]) on 2/20/2025 4:32:20 PM    Referred By: BOBBY ROSALES           Confirmed By: Shantell Mcnally         ASSESSMENT/PLAN:           Pre-op Assessment    I have reviewed the Patient Summary Reports.     I have reviewed the Nursing Notes. I have reviewed the NPO Status.   I have reviewed the Medications.     Review of Systems  Anesthesia Hx:  No problems with previous Anesthesia             Denies Family Hx of Anesthesia complications.    Denies Personal Hx of Anesthesia complications.                    Cardiovascular:     Hypertension   CAD                                          Pulmonary:  Pulmonary Normal                       Renal/:  Renal/ Normal                 Hepatic/GI:     GERD                Neurological:  Neurology Normal                                      Endocrine:  Endocrine Normal                Physical Exam  General: Well nourished    Airway:  Mallampati: II   Mouth Opening: Normal  TM Distance: > 6 cm  Tongue: Normal  Neck ROM: Normal ROM    Dental:  Intact, Edentulous    Chest/Lungs:  Normal Respiratory Rate    Heart:  Rate: Normal        Anesthesia Plan  Type of Anesthesia, risks & benefits discussed:    Anesthesia Type: Gen ETT  Intra-op Monitoring Plan: Standard ASA Monitors, Art Line, Central Line and KANDIS  Post Op Pain Control Plan: multimodal analgesia, IV/PO Opioids PRN and peripheral nerve block  Induction:  IV and rapid sequence  Airway Plan: Video, Post-Induction  Informed Consent: Informed consent signed with the Patient and  all parties understand the risks and agree with anesthesia plan.  All questions answered. Patient consented to blood products? Yes  ASA Score: 4  Day of Surgery Review of History & Physical: H&P Update referred to the surgeon/provider.    Ready For Surgery From Anesthesia Perspective.     .           [1]   Patient Active Problem List  Diagnosis    Hypertension    Genital herpes in women    GERD (gastroesophageal reflux disease)    Vitamin D deficiency    Other chest pain    Elevated troponin    Abnormal cardiovascular stress test    Coronary artery disease involving native coronary artery of native heart    Pre-op testing   [2]   No current facility-administered medications on file prior to encounter.     Current Outpatient Medications on File Prior to Encounter   Medication Sig Dispense Refill    amLODIPine (NORVASC) 5 MG tablet Take 1 tablet (5 mg total) by mouth once daily. 90 tablet 3    aspirin (ECOTRIN) 81 MG EC tablet Take 1 tablet (81 mg total) by mouth once daily. 90 tablet 3    atorvastatin (LIPITOR) 40 MG tablet Take 1 tablet (40 mg total) by mouth every evening. 90 tablet 3    ibuprofen (ADVIL,MOTRIN) 600 MG tablet Take 1 tablet (600 mg total) by mouth every 6 (six) hours as needed for Pain. 20 tablet 0    omeprazole (PRILOSEC) 40 MG capsule Take 1 capsule (40 mg total) by mouth once daily. 90 capsule 3    valACYclovir (VALTREX) 1000 MG tablet Take 1 tablet (1,000 mg total) by mouth every 12 (twelve) hours. Begin taking at first sign of outbreak 14 tablet 5    valsartan (DIOVAN) 80 MG tablet Take 1 tablet (80 mg total) by mouth once daily. 90 tablet 3   [3]   Social History  Socioeconomic History    Marital status:    Tobacco Use    Smoking status: Never    Smokeless tobacco: Never   Substance and Sexual Activity    Alcohol use: Not Currently     Alcohol/week: 1.0 standard drink of alcohol     Types: 1 Glasses of wine per week     Comment: This is Sometimes with guest or family every two months     Drug use: No    Sexual activity: Yes     Partners: Male     Birth control/protection: None     Social Drivers of Health     Financial Resource Strain: High Risk (1/15/2025)    Overall Financial Resource Strain (CARDIA)     Difficulty of Paying Living Expenses: Hard   Food Insecurity: Food Insecurity Present (1/15/2025)    Hunger Vital Sign     Worried About Running Out of Food in the Last Year: Sometimes true     Ran Out of Food in the Last Year: Often true   Transportation Needs: Patient Declined (10/28/2024)    TRANSPORTATION NEEDS     Transportation : Patient declined   Physical Activity: Insufficiently Active (1/15/2025)    Exercise Vital Sign     Days of Exercise per Week: 7 days     Minutes of Exercise per Session: 10 min   Stress: Stress Concern Present (1/15/2025)    [employer] - Occupational Stress Questionnaire     Feeling of Stress : To some extent   Housing Stability: Patient Declined (1/15/2025)    Housing Stability Vital Sign     Unable to Pay for Housing in the Last Year: Patient declined     Homeless in the Last Year: Patient declined

## 2025-02-24 NOTE — PROGRESS NOTES
1400-Notified Dr. Hines that I can not view anesthesia consent due to a  issue. He verbalized understanding.

## 2025-02-24 NOTE — ANESTHESIA PROCEDURE NOTES
Bilateral TTP Blocks    Patient location during procedure: OR   Block not for primary anesthetic.  Reason for block: at surgeon's request and post-op pain management   Post-op Pain Location: Sternal pain   Start time: 2/24/2025 3:46 PM  Timeout: 2/24/2025 3:45 PM   End time: 2/24/2025 3:55 PM    Staffing  Authorizing Provider: Scottie Hines MD  Performing Provider: Olivier Park MD    Staffing  Performed by: Olivier Park MD  Authorized by: Scottie Hines MD    Preanesthetic Checklist  Completed: patient identified, IV checked, site marked, risks and benefits discussed, surgical consent, monitors and equipment checked, pre-op evaluation and timeout performed  Peripheral Block  Patient position: supine  Prep: ChloraPrep  Patient monitoring: heart rate, cardiac monitor, continuous pulse ox, continuous capnometry and frequent blood pressure checks  Block type: Transversus thoracis  Laterality: bilateral  Injection technique: single shot  Needle  Needle type: Stimuplex   Needle gauge: 20 G  Needle length: 4 in  Needle localization: ultrasound guidance and anatomical landmarks  Needle insertion depth: 1 cm  Catheter type: non-stimulating   -ultrasound image captured on disc.  Assessment  Injection assessment: negative aspiration, negative parasthesia and local visualized surrounding nerve  Paresthesia pain: none  Heart rate change: no  Slow fractionated injection: yes  Pain Tolerance: comfortable throughout block  Medications:    Medications: bupivacaine (pf) (MARCAINE) injection 0.5% - Perineural   30 mL - 2/24/2025 3:50:00 PM

## 2025-02-25 PROBLEM — R73.9 TRANSIENT HYPERGLYCEMIA POST PROCEDURE: Status: ACTIVE | Noted: 2025-02-25

## 2025-02-25 PROBLEM — Z95.1 S/P CABG (CORONARY ARTERY BYPASS GRAFT): Status: ACTIVE | Noted: 2025-02-25

## 2025-02-25 LAB
ALBUMIN SERPL BCP-MCNC: 3.3 G/DL (ref 3.5–5.2)
ALLENS TEST: ABNORMAL
ALP SERPL-CCNC: 59 U/L (ref 40–150)
ALT SERPL W/O P-5'-P-CCNC: 14 U/L (ref 10–44)
ANION GAP SERPL CALC-SCNC: 11 MMOL/L (ref 8–16)
APTT PPP: 26 SEC (ref 21–32)
AST SERPL-CCNC: 35 U/L (ref 10–40)
BASOPHILS # BLD AUTO: 0.01 K/UL (ref 0–0.2)
BASOPHILS # BLD AUTO: 0.06 K/UL (ref 0–0.2)
BASOPHILS NFR BLD: 0.1 % (ref 0–1.9)
BASOPHILS NFR BLD: 0.3 % (ref 0–1.9)
BILIRUB SERPL-MCNC: 0.5 MG/DL (ref 0.1–1)
BUN SERPL-MCNC: 10 MG/DL (ref 6–20)
BURR CELLS BLD QL SMEAR: ABNORMAL
CALCIUM SERPL-MCNC: 8.1 MG/DL (ref 8.7–10.5)
CHLORIDE SERPL-SCNC: 111 MMOL/L (ref 95–110)
CO2 SERPL-SCNC: 16 MMOL/L (ref 23–29)
CREAT SERPL-MCNC: 0.6 MG/DL (ref 0.5–1.4)
DELSYS: ABNORMAL
DIFFERENTIAL METHOD BLD: ABNORMAL
DIFFERENTIAL METHOD BLD: ABNORMAL
EOSINOPHIL # BLD AUTO: 0 K/UL (ref 0–0.5)
EOSINOPHIL # BLD AUTO: 0.1 K/UL (ref 0–0.5)
EOSINOPHIL NFR BLD: 0 % (ref 0–8)
EOSINOPHIL NFR BLD: 0.4 % (ref 0–8)
ERYTHROCYTE [DISTWIDTH] IN BLOOD BY AUTOMATED COUNT: 13.6 % (ref 11.5–14.5)
ERYTHROCYTE [DISTWIDTH] IN BLOOD BY AUTOMATED COUNT: 13.7 % (ref 11.5–14.5)
ERYTHROCYTE [SEDIMENTATION RATE] IN BLOOD BY WESTERGREN METHOD: 16 MM/H
EST. GFR  (NO RACE VARIABLE): >60 ML/MIN/1.73 M^2
FIO2: 40
GLUCOSE SERPL-MCNC: 181 MG/DL (ref 70–110)
HCO3 UR-SCNC: 19.2 MMOL/L (ref 24–28)
HCT VFR BLD AUTO: 30.1 % (ref 37–48.5)
HCT VFR BLD AUTO: 31.7 % (ref 37–48.5)
HGB BLD-MCNC: 10.7 G/DL (ref 12–16)
HGB BLD-MCNC: 9.8 G/DL (ref 12–16)
IMM GRANULOCYTES # BLD AUTO: 0.07 K/UL (ref 0–0.04)
IMM GRANULOCYTES # BLD AUTO: 0.14 K/UL (ref 0–0.04)
IMM GRANULOCYTES NFR BLD AUTO: 0.6 % (ref 0–0.5)
IMM GRANULOCYTES NFR BLD AUTO: 0.7 % (ref 0–0.5)
INR PPP: 1.1 (ref 0.8–1.2)
LYMPHOCYTES # BLD AUTO: 1.1 K/UL (ref 1–4.8)
LYMPHOCYTES # BLD AUTO: 2.7 K/UL (ref 1–4.8)
LYMPHOCYTES NFR BLD: 12.7 % (ref 18–48)
LYMPHOCYTES NFR BLD: 8.9 % (ref 18–48)
MAGNESIUM SERPL-MCNC: 2 MG/DL (ref 1.6–2.6)
MCH RBC QN AUTO: 29.3 PG (ref 27–31)
MCH RBC QN AUTO: 31 PG (ref 27–31)
MCHC RBC AUTO-ENTMCNC: 32.6 G/DL (ref 32–36)
MCHC RBC AUTO-ENTMCNC: 33.8 G/DL (ref 32–36)
MCV RBC AUTO: 90 FL (ref 82–98)
MCV RBC AUTO: 92 FL (ref 82–98)
MODE: ABNORMAL
MONOCYTES # BLD AUTO: 0.7 K/UL (ref 0.3–1)
MONOCYTES # BLD AUTO: 0.7 K/UL (ref 0.3–1)
MONOCYTES NFR BLD: 3 % (ref 4–15)
MONOCYTES NFR BLD: 6 % (ref 4–15)
NEUTROPHILS # BLD AUTO: 10.5 K/UL (ref 1.8–7.7)
NEUTROPHILS # BLD AUTO: 17.8 K/UL (ref 1.8–7.7)
NEUTROPHILS NFR BLD: 82.9 % (ref 38–73)
NEUTROPHILS NFR BLD: 84.4 % (ref 38–73)
NRBC BLD-RTO: 0 /100 WBC
NRBC BLD-RTO: 0 /100 WBC
OHS QRS DURATION: 82 MS
OHS QTC CALCULATION: 524 MS
PCO2 BLDA: 29.8 MMHG (ref 35–45)
PEEP: 5
PH SMN: 7.42 [PH] (ref 7.35–7.45)
PHOSPHATE SERPL-MCNC: 1.8 MG/DL (ref 2.7–4.5)
PLATELET # BLD AUTO: 204 K/UL (ref 150–450)
PLATELET # BLD AUTO: 225 K/UL (ref 150–450)
PLATELET BLD QL SMEAR: ABNORMAL
PMV BLD AUTO: 9.6 FL (ref 9.2–12.9)
PMV BLD AUTO: 9.8 FL (ref 9.2–12.9)
PO2 BLDA: 125 MMHG (ref 80–100)
POC BE: -5 MMOL/L
POC SATURATED O2: 99 % (ref 95–100)
POC TCO2: 20 MMOL/L (ref 23–27)
POCT GLUCOSE: 104 MG/DL (ref 70–110)
POCT GLUCOSE: 117 MG/DL (ref 70–110)
POCT GLUCOSE: 120 MG/DL (ref 70–110)
POCT GLUCOSE: 125 MG/DL (ref 70–110)
POCT GLUCOSE: 158 MG/DL (ref 70–110)
POCT GLUCOSE: 160 MG/DL (ref 70–110)
POCT GLUCOSE: 165 MG/DL (ref 70–110)
POCT GLUCOSE: 168 MG/DL (ref 70–110)
POCT GLUCOSE: 170 MG/DL (ref 70–110)
POCT GLUCOSE: 170 MG/DL (ref 70–110)
POCT GLUCOSE: 185 MG/DL (ref 70–110)
POCT GLUCOSE: 191 MG/DL (ref 70–110)
POCT GLUCOSE: 191 MG/DL (ref 70–110)
POCT GLUCOSE: 198 MG/DL (ref 70–110)
POIKILOCYTOSIS BLD QL SMEAR: SLIGHT
POTASSIUM SERPL-SCNC: 3.5 MMOL/L (ref 3.5–5.1)
POTASSIUM SERPL-SCNC: 3.8 MMOL/L (ref 3.5–5.1)
POTASSIUM SERPL-SCNC: 3.9 MMOL/L (ref 3.5–5.1)
PROT SERPL-MCNC: 6 G/DL (ref 6–8.4)
PROTHROMBIN TIME: 11.9 SEC (ref 9–12.5)
RBC # BLD AUTO: 3.35 M/UL (ref 4–5.4)
RBC # BLD AUTO: 3.45 M/UL (ref 4–5.4)
SAMPLE: ABNORMAL
SITE: ABNORMAL
SMUDGE CELLS BLD QL SMEAR: PRESENT
SODIUM SERPL-SCNC: 138 MMOL/L (ref 136–145)
SP02: 100
TOXIC GRANULES BLD QL SMEAR: PRESENT
VT: 470
WBC # BLD AUTO: 12.4 K/UL (ref 3.9–12.7)
WBC # BLD AUTO: 21.4 K/UL (ref 3.9–12.7)

## 2025-02-25 PROCEDURE — 97535 SELF CARE MNGMENT TRAINING: CPT

## 2025-02-25 PROCEDURE — 37799 UNLISTED PX VASCULAR SURGERY: CPT

## 2025-02-25 PROCEDURE — 94010 BREATHING CAPACITY TEST: CPT

## 2025-02-25 PROCEDURE — 83735 ASSAY OF MAGNESIUM: CPT | Performed by: PHYSICIAN ASSISTANT

## 2025-02-25 PROCEDURE — 25000003 PHARM REV CODE 250: Performed by: STUDENT IN AN ORGANIZED HEALTH CARE EDUCATION/TRAINING PROGRAM

## 2025-02-25 PROCEDURE — 94640 AIRWAY INHALATION TREATMENT: CPT

## 2025-02-25 PROCEDURE — C9248 INJ, CLEVIDIPINE BUTYRATE: HCPCS | Mod: JZ,TB | Performed by: STUDENT IN AN ORGANIZED HEALTH CARE EDUCATION/TRAINING PROGRAM

## 2025-02-25 PROCEDURE — P9045 ALBUMIN (HUMAN), 5%, 250 ML: HCPCS | Mod: JZ,TB | Performed by: STUDENT IN AN ORGANIZED HEALTH CARE EDUCATION/TRAINING PROGRAM

## 2025-02-25 PROCEDURE — 85610 PROTHROMBIN TIME: CPT | Performed by: PHYSICIAN ASSISTANT

## 2025-02-25 PROCEDURE — 97530 THERAPEUTIC ACTIVITIES: CPT

## 2025-02-25 PROCEDURE — 80053 COMPREHEN METABOLIC PANEL: CPT | Performed by: STUDENT IN AN ORGANIZED HEALTH CARE EDUCATION/TRAINING PROGRAM

## 2025-02-25 PROCEDURE — 82803 BLOOD GASES ANY COMBINATION: CPT

## 2025-02-25 PROCEDURE — 25000003 PHARM REV CODE 250

## 2025-02-25 PROCEDURE — 25000003 PHARM REV CODE 250: Performed by: PHYSICIAN ASSISTANT

## 2025-02-25 PROCEDURE — 63600175 PHARM REV CODE 636 W HCPCS: Performed by: PHYSICIAN ASSISTANT

## 2025-02-25 PROCEDURE — 97162 PT EVAL MOD COMPLEX 30 MIN: CPT

## 2025-02-25 PROCEDURE — 99900017 HC EXTUBATION W/PARAMETERS (STAT)

## 2025-02-25 PROCEDURE — 84132 ASSAY OF SERUM POTASSIUM: CPT | Mod: 91 | Performed by: PHYSICIAN ASSISTANT

## 2025-02-25 PROCEDURE — 94761 N-INVAS EAR/PLS OXIMETRY MLT: CPT

## 2025-02-25 PROCEDURE — 94003 VENT MGMT INPAT SUBQ DAY: CPT

## 2025-02-25 PROCEDURE — 25000003 PHARM REV CODE 250: Performed by: THORACIC SURGERY (CARDIOTHORACIC VASCULAR SURGERY)

## 2025-02-25 PROCEDURE — 85025 COMPLETE CBC W/AUTO DIFF WBC: CPT | Performed by: PHYSICIAN ASSISTANT

## 2025-02-25 PROCEDURE — 27100171 HC OXYGEN HIGH FLOW UP TO 24 HOURS

## 2025-02-25 PROCEDURE — 99900035 HC TECH TIME PER 15 MIN (STAT)

## 2025-02-25 PROCEDURE — 94150 VITAL CAPACITY TEST: CPT

## 2025-02-25 PROCEDURE — 99900026 HC AIRWAY MAINTENANCE (STAT)

## 2025-02-25 PROCEDURE — 63600175 PHARM REV CODE 636 W HCPCS

## 2025-02-25 PROCEDURE — 20000000 HC ICU ROOM

## 2025-02-25 PROCEDURE — 97165 OT EVAL LOW COMPLEX 30 MIN: CPT

## 2025-02-25 PROCEDURE — 25000242 PHARM REV CODE 250 ALT 637 W/ HCPCS: Performed by: PHYSICIAN ASSISTANT

## 2025-02-25 PROCEDURE — 97116 GAIT TRAINING THERAPY: CPT

## 2025-02-25 PROCEDURE — 84100 ASSAY OF PHOSPHORUS: CPT | Performed by: PHYSICIAN ASSISTANT

## 2025-02-25 PROCEDURE — 99223 1ST HOSP IP/OBS HIGH 75: CPT | Mod: ,,,

## 2025-02-25 PROCEDURE — 63600175 PHARM REV CODE 636 W HCPCS: Mod: JZ,TB | Performed by: STUDENT IN AN ORGANIZED HEALTH CARE EDUCATION/TRAINING PROGRAM

## 2025-02-25 PROCEDURE — 85730 THROMBOPLASTIN TIME PARTIAL: CPT | Performed by: PHYSICIAN ASSISTANT

## 2025-02-25 RX ORDER — SODIUM,POTASSIUM PHOSPHATES 280-250MG
2 POWDER IN PACKET (EA) ORAL
Status: DISCONTINUED | OUTPATIENT
Start: 2025-02-25 | End: 2025-02-27

## 2025-02-25 RX ORDER — IBUPROFEN 200 MG
24 TABLET ORAL
Status: DISCONTINUED | OUTPATIENT
Start: 2025-02-25 | End: 2025-02-27

## 2025-02-25 RX ORDER — ATORVASTATIN CALCIUM 40 MG/1
40 TABLET, FILM COATED ORAL NIGHTLY
Status: DISCONTINUED | OUTPATIENT
Start: 2025-02-25 | End: 2025-02-28 | Stop reason: HOSPADM

## 2025-02-25 RX ORDER — INSULIN ASPART 100 [IU]/ML
0-10 INJECTION, SOLUTION INTRAVENOUS; SUBCUTANEOUS EVERY 4 HOURS PRN
Status: DISCONTINUED | OUTPATIENT
Start: 2025-02-25 | End: 2025-02-26

## 2025-02-25 RX ORDER — DEXMEDETOMIDINE HYDROCHLORIDE 4 UG/ML
0-1.4 INJECTION, SOLUTION INTRAVENOUS CONTINUOUS
Status: DISCONTINUED | OUTPATIENT
Start: 2025-02-25 | End: 2025-02-25

## 2025-02-25 RX ORDER — ACETAMINOPHEN 500 MG
1000 TABLET ORAL EVERY 6 HOURS
Status: DISCONTINUED | OUTPATIENT
Start: 2025-02-25 | End: 2025-02-28 | Stop reason: HOSPADM

## 2025-02-25 RX ORDER — IBUPROFEN 200 MG
16 TABLET ORAL
Status: DISCONTINUED | OUTPATIENT
Start: 2025-02-25 | End: 2025-02-27

## 2025-02-25 RX ORDER — OXYCODONE HYDROCHLORIDE 5 MG/1
5 TABLET ORAL
Refills: 0 | Status: DISCONTINUED | OUTPATIENT
Start: 2025-02-25 | End: 2025-02-28 | Stop reason: HOSPADM

## 2025-02-25 RX ORDER — GLUCAGON 1 MG
1 KIT INJECTION
Status: DISCONTINUED | OUTPATIENT
Start: 2025-02-25 | End: 2025-02-27

## 2025-02-25 RX ORDER — ENOXAPARIN SODIUM 100 MG/ML
40 INJECTION SUBCUTANEOUS EVERY 24 HOURS
Status: DISCONTINUED | OUTPATIENT
Start: 2025-02-25 | End: 2025-02-28 | Stop reason: HOSPADM

## 2025-02-25 RX ORDER — AMLODIPINE BESYLATE 5 MG/1
5 TABLET ORAL DAILY
Status: DISCONTINUED | OUTPATIENT
Start: 2025-02-25 | End: 2025-02-28 | Stop reason: HOSPADM

## 2025-02-25 RX ORDER — ALBUMIN HUMAN 50 G/1000ML
25 SOLUTION INTRAVENOUS ONCE
Status: COMPLETED | OUTPATIENT
Start: 2025-02-25 | End: 2025-02-25

## 2025-02-25 RX ADMIN — FAMOTIDINE 20 MG: 10 INJECTION, SOLUTION INTRAVENOUS at 11:02

## 2025-02-25 RX ADMIN — IPRATROPIUM BROMIDE AND ALBUTEROL SULFATE 3 ML: 2.5; .5 SOLUTION RESPIRATORY (INHALATION) at 04:02

## 2025-02-25 RX ADMIN — AMLODIPINE BESYLATE 5 MG: 5 TABLET ORAL at 11:02

## 2025-02-25 RX ADMIN — MUPIROCIN 1 G: 20 OINTMENT TOPICAL at 08:02

## 2025-02-25 RX ADMIN — IPRATROPIUM BROMIDE AND ALBUTEROL SULFATE 3 ML: 2.5; .5 SOLUTION RESPIRATORY (INHALATION) at 12:02

## 2025-02-25 RX ADMIN — SODIUM PHOSPHATE, MONOBASIC, MONOHYDRATE AND SODIUM PHOSPHATE, DIBASIC, ANHYDROUS 20.1 MMOL: 142; 276 INJECTION, SOLUTION INTRAVENOUS at 06:02

## 2025-02-25 RX ADMIN — POLYETHYLENE GLYCOL 3350 17 G: 17 POWDER, FOR SOLUTION ORAL at 11:02

## 2025-02-25 RX ADMIN — DEXMEDETOMIDINE HYDROCHLORIDE 1 MCG/KG/HR: 4 INJECTION INTRAVENOUS at 07:02

## 2025-02-25 RX ADMIN — POTASSIUM CHLORIDE 20 MEQ: 14.9 INJECTION, SOLUTION INTRAVENOUS at 11:02

## 2025-02-25 RX ADMIN — PROPOFOL 50 MCG/KG/MIN: 10 INJECTION, EMULSION INTRAVENOUS at 02:02

## 2025-02-25 RX ADMIN — CEFAZOLIN 2 G: 2 INJECTION, POWDER, FOR SOLUTION INTRAMUSCULAR; INTRAVENOUS at 08:02

## 2025-02-25 RX ADMIN — ACETAMINOPHEN 1000 MG: 500 TABLET ORAL at 11:02

## 2025-02-25 RX ADMIN — DOCUSATE SODIUM 100 MG: 100 CAPSULE, LIQUID FILLED ORAL at 08:02

## 2025-02-25 RX ADMIN — DOCUSATE SODIUM 100 MG: 100 CAPSULE, LIQUID FILLED ORAL at 11:02

## 2025-02-25 RX ADMIN — DEXMEDETOMIDINE HYDROCHLORIDE 0.2 MCG/KG/HR: 4 INJECTION INTRAVENOUS at 04:02

## 2025-02-25 RX ADMIN — ASPIRIN 81 MG CHEWABLE TABLET 81 MG: 81 TABLET CHEWABLE at 11:02

## 2025-02-25 RX ADMIN — POTASSIUM CHLORIDE 40 MEQ: 29.8 INJECTION, SOLUTION INTRAVENOUS at 12:02

## 2025-02-25 RX ADMIN — IPRATROPIUM BROMIDE AND ALBUTEROL SULFATE 3 ML: 2.5; .5 SOLUTION RESPIRATORY (INHALATION) at 03:02

## 2025-02-25 RX ADMIN — IPRATROPIUM BROMIDE AND ALBUTEROL SULFATE 3 ML: 2.5; .5 SOLUTION RESPIRATORY (INHALATION) at 09:02

## 2025-02-25 RX ADMIN — CEFAZOLIN 2 G: 2 INJECTION, POWDER, FOR SOLUTION INTRAMUSCULAR; INTRAVENOUS at 04:02

## 2025-02-25 RX ADMIN — MUPIROCIN 1 G: 20 OINTMENT TOPICAL at 11:02

## 2025-02-25 RX ADMIN — ENOXAPARIN SODIUM 40 MG: 40 INJECTION SUBCUTANEOUS at 04:02

## 2025-02-25 RX ADMIN — CEFAZOLIN 2 G: 2 INJECTION, POWDER, FOR SOLUTION INTRAMUSCULAR; INTRAVENOUS at 12:02

## 2025-02-25 RX ADMIN — SODIUM PHOSPHATE, MONOBASIC, MONOHYDRATE AND SODIUM PHOSPHATE, DIBASIC, ANHYDROUS 20.1 MMOL: 142; 276 INJECTION, SOLUTION INTRAVENOUS at 01:02

## 2025-02-25 RX ADMIN — CLEVIPIDINE 10 MG/HR: 0.5 EMULSION INTRAVENOUS at 09:02

## 2025-02-25 RX ADMIN — POTASSIUM BICARBONATE 50 MEQ: 978 TABLET, EFFERVESCENT ORAL at 10:02

## 2025-02-25 RX ADMIN — FENTANYL CITRATE 25 MCG: 50 INJECTION INTRAMUSCULAR; INTRAVENOUS at 02:02

## 2025-02-25 RX ADMIN — FAMOTIDINE 20 MG: 10 INJECTION, SOLUTION INTRAVENOUS at 08:02

## 2025-02-25 RX ADMIN — ALBUMIN (HUMAN) 25 G: 12.5 SOLUTION INTRAVENOUS at 01:02

## 2025-02-25 RX ADMIN — ATORVASTATIN CALCIUM 40 MG: 40 TABLET, FILM COATED ORAL at 08:02

## 2025-02-25 RX ADMIN — ACETAMINOPHEN 1000 MG: 500 TABLET ORAL at 05:02

## 2025-02-25 RX ADMIN — CLEVIPIDINE 14 MG/HR: 0.5 EMULSION INTRAVENOUS at 04:02

## 2025-02-25 RX ADMIN — IPRATROPIUM BROMIDE AND ALBUTEROL SULFATE 3 ML: 2.5; .5 SOLUTION RESPIRATORY (INHALATION) at 08:02

## 2025-02-25 NOTE — TRANSFER OF CARE
"Anesthesia Transfer of Care Note    Patient: Pat Yung    Procedure(s) Performed: Procedure(s) (LRB):  CORONARY ARTERY BYPASS GRAFT x1 (CABG) (N/A)    Patient location: ICU    Anesthesia Type: general    Transport from OR: Transported from OR intubated on 100% O2  with adequate ventilation controlled by transport ventilator    Post pain: adequate analgesia    Post assessment: no apparent anesthetic complications    Post vital signs: stable    Level of consciousness: sedated    Nausea/Vomiting: no nausea/vomiting    Complications: none    Transfer of care protocol was followed      Last vitals: Visit Vitals  BP (!) 157/77   Pulse 67   Temp 36.4 °C (97.5 °F) (Oral)   Resp (!) 21   Ht 5' 3" (1.6 m)   Wt 99.8 kg (220 lb)   LMP 09/29/2023 (Exact Date)   SpO2 100%   Breastfeeding No   BMI 38.97 kg/m²     "

## 2025-02-25 NOTE — PT/OT/SLP EVAL
Physical Therapy Evaluation    Patient Name:  Pat Yung   MRN:  [id number]  Admit Date: 2/24/2025  Admitting Diagnosis:  Coronary artery disease involving native coronary artery of native heart  Length of Stay: 1 days  Recent Surgery: Procedure(s) (LRB):  CORONARY ARTERY BYPASS GRAFT x1 (CABG) (N/A) 1 Day Post-Op    Recommendations:     Discharge Recommendations:  No Therapy Indicated   Discharge Equipment Recommendations: none   Barriers to discharge: none    Assessment:     Pat Yung is a 54 y.o. female admitted with a medical diagnosis of Coronary artery disease involving native coronary artery of native heart. Pt was able to perform a step transfer to the chair today with VSS. Will continue to progress mobility per patient's tolerance. Pt would benefit from continued acute PT to maximize functional mobility after surgical intervention.       Problem List: impaired endurance, impaired functional mobility, decreased upper extremity function, impaired cardiopulmonary response to activity, edema  Rehab Prognosis: Good; patient would benefit from acute skilled PT services to address these deficits and reach maximum level of function.      Plan:     During this hospitalization, patient to be seen 5 x/week to address the identified rehab impairments via gait training, therapeutic activities, therapeutic exercises, neuromuscular re-education and progress towards the established goals.    Plan of Care Expires:  03/24/25    Subjective   Communicated with RN prior to session.  Patient found HOB elevated upon PT entry to room, agreeable to evaluation. Pat Yung's daughter and significant other present during session.    Chief Complaint: No chief complaint on file.    Patient/Family Comments/goals: to get better  Pain/Comfort:  Pain Rating 1: 0/10  Pain Rating Post-Intervention 1: 0/10    Living Environment:  Pt lives significant other in a [facility] with no CELIO. Pt was ind with ambulation and ADLs.  Patient  "uses DME as follows: none.    Patient reports they will have assistance from significant other upon discharge.    Objective:   Patient found with: telemetry, pulse ox (continuous), blood pressure cuff, central line, peripheral IV, chest tube, riggins catheter, arterial line, external pacer     General Precautions: Standard, Cardiac fall, sternal   Orthopedic Precautions:N/A   Braces: N/A   Oxygen Device: Nasal Cannula   Vitals: /66   Pulse 75   Temp 96.7 °F (35.9 °C) (Oral)   Resp (!) 25   Ht 5' 3" (1.6 m)   Wt 99.8 kg (220 lb)   LMP 09/29/2023 (Exact Date)   SpO2 100%   Breastfeeding No   BMI 38.97 kg/m²     Exams:  Cognition:   Alert and Cooperative  Ox4  Command following: Follows multistep  commands  Fluency: clear/fluent    RLE ROM: WFL  RLE Strength: WFL  LLE ROM: WFL  LLE Strength: WFL      Outcome Measures:  AM-PAC 6 CLICK MOBILITY  Turning over in bed (including adjusting bedclothes, sheets and blankets)?: 2  Sitting down on and standing up from a chair with arms (e.g., wheelchair, bedside commode, etc.): 3  Moving from lying on back to sitting on the side of the bed?: 2  Moving to and from a bed to a chair (including a wheelchair)?: 3  Need to walk in hospital room?: 3  Climbing 3-5 steps with a railing?: 2  Basic Mobility Total Score: 15     Functional Mobility:  Additional staff present: OT  Bed Mobility:  Supine to Sit: moderate assistance; HOB elevated  Scooting anteriorly to EOB to have both feet planted on floor: minimum assistance    Sitting Balance at Edge of Bed:  Assistance Level Required: Stand-by Assistance   Comments:   Worked on activity tolerance sitting EOB and worked on tolerance to positional change   Worked on ADLs with OT    Transfers:   Sit <> Stand Transfer: contact guard assistance with no assistive device from EOB      Gait:   Patient ambulated: 2ft to the chair    Patient required: contact guard  Patient used: no assistive device  Gait Pattern observed: reciprocal " gait  Gait Deviation(s): decreased step length and decreased talha  Impairments due to:  post surgical instability   Comments:   No LOB  No SOB  Verbal cueing for pacing and upright posture      Therapeutic Activities, Exercises, & Education:   Educated pt on PT role/POC  Educated pt on importance of OOB activity and daily ambulation   Educated pt on sternal precautions   Pt verbalized understanding       T/f to chair to increase tolerance to OOB activity and to create optimal positioning for lung expansion     Patient left up in chair with all lines intact, call button in reach, and RN notified.    GOALS:   Multidisciplinary Problems       Physical Therapy Goals          Problem: Physical Therapy    Goal Priority Disciplines Outcome Interventions   Physical Therapy Goal     PT, PT/OT Progressing    Description: Goals to be met by: 3/15/2025    Patient will increase functional independence with mobility by performin. Supine to sit with Supervision  2. Sit to stand transfer with Supervision  3. Gait  x 400 feet with Supervision.                          History:     Past Medical History:   Diagnosis Date    GERD (gastroesophageal reflux disease)     Herpes simplex without mention of complication     Hyperlipidemia     Hypertension     Ulcer     Vitamin D deficiency        Past Surgical History:   Procedure Laterality Date    ANGIOGRAM, CORONARY ARTERY - Right Right 2025     SECTION  1998    x 1    COLONOSCOPY N/A 2021    Procedure: COLONOSCOPY;  Surgeon: Jesu Canas MD;  Location: [facility] ENDO;  Service: Endoscopy;  Laterality: N/A;  covid  stbernard -ml    CORONARY ANGIOGRAPHY Right 2025    Procedure: ANGIOGRAM, CORONARY ARTERY;  Surgeon: Mauro Delaney MD;  Location: [facility] CATH LAB;  Service: Cardiology;  Laterality: Right;    CORONARY ARTERY BYPASS GRAFT (CABG) N/A 2025    Procedure: CORONARY ARTERY BYPASS GRAFT x1 (CABG);  Surgeon: Josef Nugent MD;  Location:  [facility] OR 2ND FLR;  Service: Cardiothoracic;  Laterality: N/A;  LIMA to LAD    ESOPHAGOGASTRODUODENOSCOPY  12/28/2022    ESOPHAGOGASTRODUODENOSCOPY N/A 12/28/2022    Procedure: EGD (ESOPHAGOGASTRODUODENOSCOPY);  Surgeon: Bhupendra Delacruz MD;  Location: [facility];  Service: Endoscopy;  Laterality: N/A;    TUBAL LIGATION  1998       Time Tracking:     PT Received On: 02/25/25  PT Start Time: 1350     PT Stop Time: 1418  PT Total Time (min): 28 min     Billable Minutes: Evaluation 5, Gait Training 8, and Therapeutic Activity 15

## 2025-02-25 NOTE — PROGRESS NOTES
Autotransfusion/Rapid Infusion Record:      02/24/2025  Autotransfusionist:  Mariann Mitchell    Surgeons and Role:     * Josef Nugent MD - Primary     * Suyapa Travis MD - Fellow  Anesthesiologist:  Scottie Hines MD    Past Medical History:   Diagnosis Date    GERD (gastroesophageal reflux disease)     Herpes simplex without mention of complication     Hyperlipidemia     Hypertension     Ulcer     Vitamin D deficiency        Procedure(s) (LRB):  CORONARY ARTERY BYPASS GRAFT x1 (CABG) (N/A)     9:09 PM    Equipment:    Cell Saver     R.I.S.  : [employer] Model: CATSmart or CATSplus : [place]   Model: [id number]     Serial number: [id number]   Serial number: 0   Disposable lot #: [id number]   Disposable lot #: 0     Were extra cardiotomies used for cell saver?  N   if yes, #:  0    Solutions:  Anticoagulant: ACD-A   Expiration date: 11/26 Volume used: 500   Wash solution: 0.9% NaCl   Expiration date: 02/26 Volume used: 1127     Cell saver checklist  Time completed:           [x]   Circuit assembled correctly     [x]   Cell saver powered and operational     [x]   Vacuum connected, functional, adjust to max -150mmHg     [x]   Anticoagulant drip rate adjusted     [x]   Transfer bag properly labeled with patient name, expiration time, volume,       anticoagulant, OR number, and initials     [x]   Cell saver disinfected after use (completed at end of case)       Cell Saver volumes:    Total volume processed:     2145 mL     Total volume pRBCs recovered     355 mL     Volume pRBCs infused     355 mL         RIS checklist   Time completed:  []   RIS circuit assembled correctly     []   RIS power and operational     []   RIS disinfected after use (completed at end of case)       RIS volumes:    Total volume infused:    (see anesthesia record for blood       product information)   0 mL       Additional comments:

## 2025-02-25 NOTE — CONSULTS
[facility]  Endocrinology  Diabetes Consult Note    Consult Requested by: Josef Nugent MD   Reason for admit: Coronary artery disease involving native coronary artery of native heart    HISTORY OF PRESENT ILLNESS:  Reason for Consult: Management of Hyperglycemia     Surgical Procedure and Date: CABG 2/25/25    Diabetes diagnosis year: No hx of DM per chart review     HPI:   Patient is a 54 y.o. female with  pmhx gerd, htn, hld and CAD. She had chest pain that prompted a cardiac evaluation. A LHC was done which demonstrated Ost LAD to Prox LAD lesion was 99% stenosed. The patient presents to the SICU s/p CABG x1 (LIMA- LAD) with Dr. Nugent on 02/24/2025. Endocrine consulted for BG management.     Interval HPI:   No acute events overnight. Patient in room 72430/56037 A. Blood glucose stable. BG at and above goal on current insulin regimen (IIP). Steroid use- None.   1 Day Post-Op  Renal function-   Lab Results   Component Value Date    CREATININE 0.6 02/25/2025        Vasopressors-  None     Diet NPO Except for: Sips with Medication     Eating:   NPO  Nausea: No  Hypoglycemia and intervention: No  Fever: No  TPN and/or TF: No    PMH, PSH, FH, SH updated and reviewed     ROS:  Review of Systems   Gastrointestinal:  Negative for constipation, diarrhea, nausea and vomiting.       Current Medications and/or Treatments Impacting Glycemic Control  Immunotherapy:    Immunosuppressants       None          Steroids:   Hormones (From admission, onward)      None          Pressors:    Autonomic Drugs (From admission, onward)      None          Hyperglycemia/Diabetes Medications:   Antihyperglycemics (From admission, onward)      Start     Stop Route Frequency Ordered    02/24/25 2200  insulin regular in 0.9 % NaCl 100 unit/100 mL (1 unit/mL) infusion        Question Answer Comment   Insulin rate changes (DO NOT MODIFY ANSWER) \\[email or web address]\epic\Images\Pharmacy\InsulinInfusions\CTS INSULIN  "[id number].pdf    Enter initial dose (Units/hr): 1        -- IV Continuous 02/24/25 2156             PHYSICAL EXAMINATION:  Vitals:    02/25/25 1000   BP:    Pulse: 66   Resp: (!) 31   Temp:      Body mass index is 38.97 kg/m².     Physical Exam  Vitals reviewed.   Constitutional:       Appearance: Normal appearance.   HENT:      Head: Normocephalic.   Neurological:      General: No focal deficit present.      Mental Status: She is alert.            Labs Reviewed and Include   Recent Labs   Lab 02/25/25  0344 02/25/25  0946   *  --    CALCIUM 8.1*  --    ALBUMIN 3.3*  --    PROT 6.0  --      --    K 3.9 3.5   CO2 16*  --    *  --    BUN 10  --    CREATININE 0.6  --    ALKPHOS 59  --    ALT 14  --    AST 35  --    BILITOT 0.5  --      Lab Results   Component Value Date    WBC 12.40 02/25/2025    HGB 9.8 (L) 02/25/2025    HCT 30.1 (L) 02/25/2025    MCV 90 02/25/2025     02/25/2025     No results for input(s): "TSH", "FREET4" in the last 168 hours.  Lab Results   Component Value Date    HGBA1C 5.6 02/20/2025       Nutritional status:   Body mass index is 38.97 kg/m².  Lab Results   Component Value Date    ALBUMIN 3.3 (L) 02/25/2025    ALBUMIN 2.6 (L) 02/24/2025    ALBUMIN 3.9 02/20/2025     No results found for: "PREALBUMIN"    Estimated Creatinine Clearance: 120.8 mL/min (based on SCr of 0.6 mg/dL).    Accu-Checks  Recent Labs     02/24/25  2304 02/24/25  2357 02/25/25  0056 02/25/25  0155 02/25/25  0255 02/25/25  0356 02/25/25  0506 02/25/25  0608 02/25/25  0712 02/25/25  0826   POCTGLUCOSE 163* 135* 170* 158* 170* 165* 168* 160* 191* 191*        ASSESSMENT and PLAN    Cardiac/Vascular  * Coronary artery disease involving native coronary artery of native heart  Managed per primary team        S/P CABG (coronary artery bypass graft)  Optimize BG control         Endocrine  Transient hyperglycemia post procedure  BG goal: 110-140    - d/c insulin infusion protocol given diet advanced  - Start " transition insulin drip at 0.8 u/hr (based on needs while on IIP)   - Start [facility] [place] SSI PRN   - POCT Glucose every 4 hours  - Hypoglycemia protocol in place      ** Please notify Endocrine for any change and/or advance in diet**  ** Please call Endocrine for any BG related issues **     Discharge Planning:   TBD. Please notify endocrine prior to discharge.             Plan discussed with patient, family bedside.     Kylie Dong PA-C  Endocrinology  [facility]

## 2025-02-25 NOTE — H&P
[facility]  Critical Care - Surgery  History & Physical    Patient Name: Pat Yung  MRN: [id number]  Admission Date: 2/24/2025  Code Status: Full Code  Attending Physician: Josef Nugent MD   Primary Care Provider: Jero Weinberg MD   Principal Problem: Coronary artery disease involving native coronary artery of native heart    Subjective:     HPI:  Pat Yung is a 54F with pmhx gerd, htn, hld and CAD. She had chest pain that prompted a cardiac evaluation. A LHC was done which demonstrated Ost LAD to Prox LAD lesion was 99% stenosed.     The patient presents to the SICU s/p CABG x1 (LIMA- LAD) with Dr. Nugent on 02/24/2025. On admission, they are intubated, sedated with propofol, and in stable condition. Inotropic and vasopressor requirements upon admission are 0.02 mcg/kg/min of epinephrine, maintain blood pressure at a MAP 60-80 and SBP < 140. Central access includes a RIJ CVC, arterial access includes a left radial arterial line. They also have L pleural and 2 mediastinal chest tubes with appropriate output.    Intraoperatively, they received 2L of crystalloid, 350 of cell saver, 0 PRBCs, 0 FFP, 0 platelets, and 0 cryoprecipitate. Urine output intraoperatively was 1.5Lcc. The pre-operative echocardiogram was notable for LVEF 40-50%, normal RV function, no significant valvular abnromalities. Post-operative echo was LVEF >55%, mild TR.    Immediate post-operative plans include hemodynamic stabilization and weaning of cardiac and respiratory support. Plan to wean vasopressors and inotropes as tolerated, wean ventilator support with goal of early extubation, and closely monitor fluid status with strict Is/Os and continued fluid resuscitation as needed.     Hospital/ICU Course:  No notes on file    Follow-up For: Procedure(s) (LRB):  CORONARY ARTERY BYPASS GRAFT x1 (CABG) (N/A)    Post-Operative Day: * Day of Surgery *     Past Medical History:   Diagnosis Date    GERD  "(gastroesophageal reflux disease)     Herpes simplex without mention of complication     Hyperlipidemia     Hypertension     Ulcer     Vitamin D deficiency        Past Surgical History:   Procedure Laterality Date    ANGIOGRAM, CORONARY ARTERY - Right Right 2025     SECTION  1998    x 1    COLONOSCOPY N/A 2021    Procedure: COLONOSCOPY;  Surgeon: Jesu Canas MD;  Location: [facility] ENDO;  Service: Endoscopy;  Laterality: N/A;  covid  stbernard -ml    CORONARY ANGIOGRAPHY Right 2025    Procedure: ANGIOGRAM, CORONARY ARTERY;  Surgeon: Mauro Delaney MD;  Location: [facility] CATH LAB;  Service: Cardiology;  Laterality: Right;    ESOPHAGOGASTRODUODENOSCOPY  2022    ESOPHAGOGASTRODUODENOSCOPY N/A 2022    Procedure: EGD (ESOPHAGOGASTRODUODENOSCOPY);  Surgeon: Bhupendra Delacruz MD;  Location: [facility] ENDO;  Service: Endoscopy;  Laterality: N/A;    TUBAL LIGATION         Review of patient's allergies indicates:   Allergen Reactions    Nexium [esomeprazole magnesium] Hives     Patient says that began having "break-outs" on her abdomen    Lisinopril Other (See Comments)     Cough        Family History       Problem Relation (Age of Onset)    COPD Maternal Grandfather    Diabetes Maternal Grandmother    Heart disease Maternal Grandmother, Maternal Grandfather    Hyperlipidemia Maternal Grandfather    Hypertension Maternal Grandfather    Kidney disease Maternal Grandmother    Sarcoidosis Mother    Stroke Maternal Grandmother, Maternal Grandfather    Vision loss Maternal Grandmother, Paternal Grandmother    Vitiligo Mother          Tobacco Use    Smoking status: Never    Smokeless tobacco: Never   Substance and Sexual Activity    Alcohol use: Not Currently     Alcohol/week: 1.0 standard drink of alcohol     Types: 1 Glasses of wine per week     Comment: This is Sometimes with guest or family every two months    Drug use: No    Sexual activity: Yes     Partners: Male     Birth " control/protection: None      Review of Systems   Unable to perform ROS: Intubated     Objective:     Vital Signs (Most Recent):  Temp: 97.5 °F (36.4 °C) (02/24/25 2215)  Pulse: 69 (02/24/25 2215)  Resp: 17 (02/24/25 2215)  BP: (!) 157/77 (02/24/25 1155)  SpO2: 100 % (02/24/25 2215) Vital Signs (24h Range):  Temp:  [97.5 °F (36.4 °C)-97.9 °F (36.6 °C)] 97.5 °F (36.4 °C)  Pulse:  [69-78] 69  Resp:  [17] 17  SpO2:  [100 %] 100 %  BP: (157)/(77) 157/77     Weight: 99.8 kg (220 lb)  Body mass index is 38.97 kg/m².      Intake/Output Summary (Last 24 hours) at 2/24/2025 2221  Last data filed at 2/24/2025 2133  Gross per 24 hour   Intake 2355 ml   Output 975 ml   Net 1380 ml          Physical Exam  Vitals reviewed.   Constitutional:       Comments: Intubated, sedated   HENT:      Mouth/Throat:      Comments: ETT  Neck:      Comments: RIJ CVC  Cardiovascular:      Rate and Rhythm: Normal rate and regular rhythm.      Comments: V wires, back up rate 50  Pulmonary:      Comments: intubated  Abdominal:      General: There is no distension.   Genitourinary:     Comments: [place] with clear urine  Musculoskeletal:         General: No swelling.   Skin:     General: Skin is warm and dry.   Neurological:      Comments: sedated            Vents:  Ventilator Initiated: Yes (02/24/25 2202)  Oxygen Concentration (%): 100 (02/24/25 2215)    Lines/Drains/Airways       Central Venous Catheter Line  Duration             Introducer 02/24/25 1659 <1 day              Drain  Duration                  Chest Tube 02/24/25 2044 Tube - 1 Left Pleural 19 Fr. <1 day         Urethral Catheter 02/24/25 1542 Straight-tip;Temperature probe 14 Fr. <1 day         Y Chest Tube 1 and 2 02/24/25 2044 1 Right Mediastinal 19 Fr. 2 Left Mediastinal 19 Fr. <1 day              Airway  Duration                  Airway - Non-Surgical 02/24/25 1538 <1 day              Arterial Line  Duration             Arterial Line 02/24/25 1531 Left Radial <1 day              Line  " Duration                  Pacer Wires 02/24/25 <1 day              Peripheral Intravenous Line  Duration                  Peripheral IV - Single Lumen 02/24/25 1130 20 G Left;Posterior Forearm <1 day         Peripheral IV - Single Lumen 02/24/25 1605 14 G  Left;Anterior Forearm <1 day                    Significant Labs:    CBC/Anemia Profile:  Recent Labs   Lab 02/24/25  2030 02/24/25  2058 02/24/25  2212   HCT 25* 23* 30*        Chemistries:  No results for input(s): "NA", "K", "CL", "CO2", "BUN", "CREATININE", "CALCIUM", "ALBUMIN", "PROT", "BILITOT", "ALKPHOS", "ALT", "AST", "GLUCOSE", "MG", "PHOS" in the last 48 hours.    All pertinent labs within the past 24 hours have been reviewed.    Significant Imaging: I have reviewed all pertinent imaging results/findings within the past 24 hours.  Assessment/Plan:     Cardiac/Vascular  * Coronary artery disease involving native coronary artery of native heart  S/p CABG x1 (LIMA-LAD) w/ Dr. Nugent on 2/24. Required Defib x2 for Vfib coming off pump. No arrhythmias since. Briefly on low dose levo + epi, admitted to SICU on 0.02 epi, stable.    Neuro/Psych:   - Sedation: propofol  - Pain:     - Scheduled Tylenol 1g q8h    - Fentanyl PRN while intubated, Oxy PRN                Cardiac:   - BP Goal: MAP 60-80  - Pressors/Inotropes: epi 0.02  - Rhythm: NSR  - Anti-HTNs: Resume as appropriate   - Beta blocker: Resume as appropriate    - Statin: Atorvastatin 40 mg QD    Pulmonary:   - Goal SpO2 >92%  - Will wean ventilator support as tolerated to extubate  - ABGs PRN  - Chest Tubes x 3 (2 Meds & 1 Pleural)      Renal:    - Maintain [place], record strict Is/Os  Recent Labs   Lab 02/20/25  1258   BUN 13   CREATININE 0.6       FEN / GI:     - Daily CMP, Mag/Phos     - Replace electrolytes as needed    - Nutrition: NPO pending extubation    - Bowel Regimen: Miralax, docusate    - Famotidine BID     ID:   - Afebrile  - Abx: Complete perioperative cefazolin 2g Q8H x 5 doses  Recent " Labs   Lab 02/20/25  1258   WBC 10.29       Heme/Onc:   - Hgb 13.2 pre-operatively  - CBC, PTT/INR daily  - DVT ppx: SCDs  - ASA 325mg daily  Recent Labs   Lab 02/20/25  1258   HGB 13.2      APTT 27.1   INR 1.0       Endocrine:   - CTS Goal -140  - HgbA1c: 5.6%  - Endocrinology consulted for insulin management  - Insulin gtt     PPx:   Feeding: NPO pending extubation  Analgesia/Sedation: MMPC  Thromboembolic Prevention: SCDs  HOB >30: Yes  Stress Ulcer: famotidine BID  Glucose Control: Yes, insulin management per Endocrinology     Lines/Drains/Airway:  ETT  Left radial arterial line  RIJ CVC  [place]  Chest Tubes: 3 (2 Mediastinal, 1 Pleural)   Pacing Wires: Temporary ventricular pacing wires     Dispo/Code Status/Palliative:    - SICU   - Full Code           Critical care was time spent personally by me on the following activities: development of treatment plan with patient or surrogate and bedside caregivers, discussions with consultants, evaluation of patient's response to treatment, examination of patient, ordering and performing treatments and interventions, ordering and review of laboratory studies, ordering and review of radiographic studies, pulse oximetry, re-evaluation of patient's condition.  This critical care time did not overlap with that of any other provider or involve time for any procedures.     Henry Mclaughlin MD  Critical Care - Surgery  [facility]

## 2025-02-25 NOTE — SUBJECTIVE & OBJECTIVE
Interval HPI:   No acute events overnight. Patient in room 82773/22849 A. Blood glucose stable. BG at and above goal on current insulin regimen (IIP). Steroid use- None.   1 Day Post-Op  Renal function-   Lab Results   Component Value Date    CREATININE 0.6 02/25/2025        Vasopressors-  None     Diet NPO Except for: Sips with Medication     Eating:   NPO  Nausea: No  Hypoglycemia and intervention: No  Fever: No  TPN and/or TF: No    PMH, PSH, FH, SH updated and reviewed     ROS:  Review of Systems   Gastrointestinal:  Negative for constipation, diarrhea, nausea and vomiting.       Current Medications and/or Treatments Impacting Glycemic Control  Immunotherapy:    Immunosuppressants       None          Steroids:   Hormones (From admission, onward)      None          Pressors:    Autonomic Drugs (From admission, onward)      None          Hyperglycemia/Diabetes Medications:   Antihyperglycemics (From admission, onward)      Start     Stop Route Frequency Ordered    02/24/25 2200  insulin regular in 0.9 % NaCl 100 unit/100 mL (1 unit/mL) infusion        Question Answer Comment   Insulin rate changes (DO NOT MODIFY ANSWER) \\[email or web address]\epic\Images\Pharmacy\InsulinInfusions\CTS INSULIN [id number]    Enter initial dose (Units/hr): 1        -- IV Continuous 02/24/25 2156             PHYSICAL EXAMINATION:  Vitals:    02/25/25 1000   BP:    Pulse: 66   Resp: (!) 31   Temp:      Body mass index is 38.97 kg/m².     Physical Exam  Vitals reviewed.   Constitutional:       Appearance: Normal appearance.   HENT:      Head: Normocephalic.   Neurological:      General: No focal deficit present.      Mental Status: She is alert.

## 2025-02-25 NOTE — RESPIRATORY THERAPY
Patient extubated per MD order with parameters obtained by MD at bedside, patient placed on 3 LPM nasal cannula, change tolerated well, will continue to monitor patient status.

## 2025-02-25 NOTE — HPI
Reason for Consult: Management of Hyperglycemia     Surgical Procedure and Date: CABG 2/25/25    Diabetes diagnosis year: No hx of DM per chart review     HPI:   Patient is a 54 y.o. female with  pmhx gerd, htn, hld and CAD. She had chest pain that prompted a cardiac evaluation. A LHC was done which demonstrated Ost LAD to Prox LAD lesion was 99% stenosed. The patient presents to the SICU s/p CABG x1 (LIMA- LAD) with Dr. Nugent on 02/24/2025. Endocrine consulted for BG management.

## 2025-02-25 NOTE — PLAN OF CARE
[facility] - Surgical Intensive Care  Initial Discharge Assessment       Primary Care Provider: Jero Weinberg MD    Admission Diagnosis: Coronary artery disease involving native coronary artery of native heart with angina pectoris [I25.119]  CAD (coronary artery disease) [I25.10]    Admission Date: 2/24/2025  Expected Discharge Date: 2/28/2025    Transition of Care Barriers: None    Payor: BLUE CROSS BLUE SHIELD / Plan: [facility] / Product Type: Commercial /     Extended Emergency Contact Information  Primary Emergency Contact: Teresita Leigh  Address: [address] [place] [address] [place] [address] [place]  Mobile Phone: [phone]  Relation: Daughter  Secondary Emergency Contact: Liz Green  Mobile Phone: [phone]  Relation: Mother  Preferred language: English   needed? No    Discharge Plan A: Home with family  Discharge Plan B: Home      [id number] - [place] - [address] AT [facility] [address] [address]  Phone: [phone] Fax: [phone]      Initial Assessment (most recent)       Adult Discharge Assessment - 02/25/25 1449          Discharge Assessment    Assessment Type Discharge Planning Assessment     Confirmed/corrected address, phone number and insurance Yes     Confirmed Demographics Correct on Facesheet     Source of Information patient     Communicated ELIECER with patient/caregiver Yes     Reason For Admission CAD involving native coronary artery of native heart     People in Home significant other     Facility Arrived From: Home     Do you expect to return to your current living situation? Yes     Do you have help at home or someone to help you manage your care at home? Yes     Who are your caregiver(s) and their phone number(s)? Teresita Leigh (Daughter)  [phone]     Prior to hospitilization cognitive status: Alert/Oriented;No Deficits     Current cognitive status:  Alert/Oriented;No Deficits     Walking or Climbing Stairs Difficulty no     Dressing/Bathing Difficulty no     Home Accessibility not wheelchair accessible     Home Layout Able to live on 1st floor     Equipment Currently Used at Home none     Readmission within 30 days? No     Patient currently being followed by outpatient case management? No     Do you currently have service(s) that help you manage your care at home? No     Do you take prescription medications? Yes     Do you have prescription coverage? Yes     Coverage BCBS     Do you have any problems affording any of your prescribed medications? No     Is the patient taking medications as prescribed? yes     Who is going to help you get home at discharge? Teresita Leigh (Daughter)  [phone]     How do you get to doctors appointments? car, drives self     Are you on dialysis? No     Do you take coumadin? No     Discharge Plan A Home with family     Discharge Plan B Home     DME Needed Upon Discharge  none     Discharge Plan discussed with: Patient     Transition of Care Barriers None        Physical Activity    On average, how many days per week do you engage in moderate to strenuous exercise (like a brisk walk)? 7 days     On average, how many minutes do you engage in exercise at this level? 60 min        Financial Resource Strain    How hard is it for you to pay for the very basics like food, housing, medical care, and heating? Not hard at all        Housing Stability    In the last 12 months, was there a time when you were not able to pay the mortgage or rent on time? No     At any time in the past 12 months, were you homeless or living in a shelter (including now)? No        Transportation Needs    Has the lack of transportation kept you from medical appointments, meetings, work or from getting things needed for daily living? No        Food Insecurity    Within the past 12 months, you worried that your food would run out before you got the money to buy  more. Never true     Within the past 12 months, the food you bought just didn't last and you didn't have money to get more. Never true        Stress    Do you feel stress - tense, restless, nervous, or anxious, or unable to sleep at night because your mind is troubled all the time - these days? Not at all        Social Isolation    How often do you feel lonely or isolated from those around you?  Never        Alcohol Use    Q1: How often do you have a drink containing alcohol? Never     Q2: How many drinks containing alcohol do you have on a typical day when you are drinking? Patient does not drink     Q3: How often do you have six or more drinks on one occasion? Never        Utilities    In the past 12 months has the electric, gas, oil, or water company threatened to shut off services in your home? No        Health Literacy    How often do you need to have someone help you when you read instructions, pamphlets, or other written material from your doctor or pharmacy? Never        OTHER    Name(s) of People in Home boyfriend                   CM spoke with patient in Room 50838 at bedside. All information was verified on facesheet. Patient lives in a 1 story house with boyfriend, 1 step to get inside with no pets. Patient states no assistance is needed. Patient can ambulate, drive, run errands, and complete ADL's independently. Patient does not use any DME or any in-home assistive equipment. Patient has not used Home Health previously. Patient is not on dialysis nor use Coumadin as a blood thinner. Patient takes medication as prescribed and has resources for all prescriptive needs. Patient will have help from family member upon discharge. Family will provide transportation home. All Questions and concerns addressed and whiteboard updated with CM contact information. Will continue to follow for course of hospitalization.      Discharge Plan A and Plan B have been determined by review of patient's clinical status,  future medical and therapeutic needs, and coverage/benefits for post-acute care in coordination with multidisciplinary team members.     Jacklyn Keita, RN, BSN  Case Management  [phone]

## 2025-02-25 NOTE — PLAN OF CARE
Problem: Physical Therapy  Goal: Physical Therapy Goal  Description: Goals to be met by: 3/15/2025    Patient will increase functional independence with mobility by performin. Supine to sit with Supervision  2. Sit to stand transfer with Supervision  3. Gait  x 400 feet with Supervision.     Outcome: Progressing     Eval completed. Goals appropriate.

## 2025-02-25 NOTE — PLAN OF CARE
Problem: Occupational Therapy  Goal: Occupational Therapy Goal  Description: Goals to be met by: 3/25/25 (1 mo)     Patient will increase functional independence with ADLs by performing:    UE Dressing with [place].  LE Dressing with [place].  Grooming while standing at sink with [place].  Toileting from toilet with [place] for hygiene and clothing management.   Rolling to Bilateral with [place].   Supine to sit with [place].  Step transfer with [place]  Toilet transfer to toilet with [place].    Evaluated pt and established OT POC. Continue OT as tolerated.  Tatiana Steven OT  2/25/2025    Outcome: Progressing

## 2025-02-25 NOTE — PT/OT/SLP EVAL
Occupational Therapy   Evaluation and Co-Treatment     Name: Pat Yung  MRN: [id number]  Admitting Diagnosis: Coronary artery disease involving native coronary artery of native heart  Recent Surgery: Procedure(s) (LRB):  CORONARY ARTERY BYPASS GRAFT x1 (CABG) (N/A) 1 Day Post-Op    Recommendations:     Discharge Recommendations: No Therapy Indicated  Discharge Equipment Recommendations:  none  Barriers to discharge:       Assessment:     Pat Yung is a 54 y.o. female with a medical diagnosis of Coronary artery disease involving native coronary artery of native heart.Pt found upright in bed and agreeable for therapy. Session focused on EOB ADLs and safe setup to chair. Pt limited today weakness s/p extubation however motivated to improve (I). Patient continues to demonstrate the need for occupational therapy on a scheduled basis exhibited by decreased independence with self-care and functional mobility. Performance deficits affecting function: weakness, gait instability, decreased upper extremity function, impaired endurance, impaired balance, decreased lower extremity function, impaired self care skills, impaired functional mobility, impaired cardiopulmonary response to activity.      Rehab Prognosis: Good; patient would benefit from acute skilled OT services to address these deficits and reach maximum level of function.       Plan:     Patient to be seen 5 x/week to address the above listed problems via self-care/home management, therapeutic activities, therapeutic exercises  Plan of Care Expires: 03/25/25  Plan of Care Reviewed with: patient, significant other    Subjective     Chief Complaint: weakness s/p extubation  Patient/Family Comments/goals: to get better     Occupational Profile:  Living Environment: Pt lives with significant other in a [facility] with 0 CELIO. Pt has a shower/tub combo for bathing   Previous level of function: I with ADLs and mobility   Roles and Routines: pt currently disabled  however was working as a ; drives. Enjoys spending time with her 5 kids.  Equipment Used at Home: none  Assistance upon Discharge: family    Pain/Comfort:  Pain Rating 1: 0/10  Pain Rating Post-Intervention 1: 0/10  Pain Rating Post-Intervention 2: 0/10    Patients cultural, spiritual, Nondenominational conflicts given the current situation: no    Objective:   Co-treatment with PT for maximal pt participation, safety, and activity tolerance     Communicated with: RN prior to session.  Patient found HOB elevated with pulse ox (continuous), blood pressure cuff, telemetry, chest tube, riggins catheter, external pacer, peripheral IV upon OT entry to room.    General Precautions: Standard, fall, sternal  Orthopedic Precautions: N/A  Braces: N/A  Respiratory Status: Room air    Occupational Performance:    Bed Mobility:    Patient completed Rolling/Turning to Left with  minimum assistance  Patient completed Scooting/Bridging with moderate assistance  Patient completed Supine to Sit with minimum assistance    Functional Mobility/Transfers:  Patient completed Sit <> Stand Transfer with contact guard assistance  with  hand-held assist   Patient completed Bed <> Chair Transfer using Stand Pivot technique with minimum assistance with hand-held assist  Functional Mobility: Pt stood with min A-HHA and took several steps with pivot to chair after completing ADLs at EOB.    Activities of Daily Living:  Grooming: stand by assistance washing face and brushing teeth; min A needed for occasional manipulation   Upper Body Dressing: minimum assistance donning gown around back    Cognitive/Visual Perceptual:  Cognitive/Psychosocial Skills:     -       Oriented to: Person, Place, Time, and Situation   -       Follows Commands/attention:Follows multistep  commands  -       Communication: clear/fluent  -       Memory: No Deficits noted  -       Safety awareness/insight to disability: intact   -       Mood/Affect/Coping  skills/emotional control: Appropriate to situation    Physical Exam:  Balance:    -       demo good sitting and standing balance using no AD   Upper Extremity Range of Motion:     -       Right Upper Extremity: WFL  -       Left Upper Extremity: WFL  Upper Extremity Strength:    -       Right Upper Extremity: WFL   Strength:    -       Right Upper Extremity: WFL  -       Left Upper Extremity: WFL    AMPAC 6 Click ADL:  AMPAC Total Score: 16    Treatment & Education:  Pt educated on role of occupational therapy, POC, and safety during ADLs and functional mobility. Pt and OT discussed importance of safe, continued mobility to optimize daily living skills. Pt verbalized understanding.     White board updated during session. Pt given instruction to call for medical staff/nurse for assistance.       Patient left up in chair with all lines intact, call button in reach, RN notified, and family present    GOALS:   Multidisciplinary Problems       Occupational Therapy Goals          Problem: Occupational Therapy    Goal Priority Disciplines Outcome Interventions   Occupational Therapy Goal     OT, PT/OT Progressing    Description: Goals to be met by: 3/25/25 (1 mo)     Patient will increase functional independence with ADLs by performing:    UE Dressing with [place].  LE Dressing with [place].  Grooming while standing at sink with [place].  Toileting from toilet with [place] for hygiene and clothing management.   Rolling to Bilateral with [place].   Supine to sit with [place].  Step transfer with [place]  Toilet transfer to toilet with [place].                         DME Justifications:  No DME recommended requiring DME justifications    History:     Past Medical History:   Diagnosis Date    GERD (gastroesophageal reflux disease)     Herpes simplex without mention of complication     Hyperlipidemia     Hypertension     Ulcer     Vitamin D deficiency          Past Surgical History:    Procedure Laterality Date    ANGIOGRAM, CORONARY ARTERY - Right Right 2025     SECTION  1998    x 1    COLONOSCOPY N/A 2021    Procedure: COLONOSCOPY;  Surgeon: Jesu Canas MD;  Location: [facility] ENDO;  Service: Endoscopy;  Laterality: N/A;  covid  stbernard -ml    CORONARY ANGIOGRAPHY Right 2025    Procedure: ANGIOGRAM, CORONARY ARTERY;  Surgeon: Mauro Delaney MD;  Location: [facility] CATH LAB;  Service: Cardiology;  Laterality: Right;    CORONARY ARTERY BYPASS GRAFT (CABG) N/A 2025    Procedure: CORONARY ARTERY BYPASS GRAFT x1 (CABG);  Surgeon: Josef Nugent MD;  Location: [facility] OR [address] [place] [address];  Service: Cardiothoracic;  Laterality: N/A;  LIMA to LAD    ESOPHAGOGASTRODUODENOSCOPY  2022    ESOPHAGOGASTRODUODENOSCOPY N/A 2022    Procedure: EGD (ESOPHAGOGASTRODUODENOSCOPY);  Surgeon: Bhupendra Delacruz MD;  Location: [facility] ENDO;  Service: Endoscopy;  Laterality: N/A;    TUBAL LIGATION         Time Tracking:     OT Date of Treatment: 25  OT Start Time: 1350  OT Stop Time: 1417  OT Total Time (min): 27 min    Billable Minutes:Evaluation 10 min  Self Care/Home Management 17 min     2025

## 2025-02-25 NOTE — NURSING
Pt arrived on unit s/p CABG x1 with anesthesia and CTS. Charge nurse, RT, and CTS at bedside. Connected to ventilator, connected to monitor, chest tube to suction, labs drawn, and assessment performed. Awaiting new orders.

## 2025-02-25 NOTE — ANESTHESIA POSTPROCEDURE EVALUATION
Anesthesia Post Evaluation    Patient: Pat Yung    Procedure(s) Performed: Procedure(s) (LRB):  CORONARY ARTERY BYPASS GRAFT x1 (CABG) (N/A)    Final Anesthesia Type: general      Patient location during evaluation: ICU  Patient participation: No - Unable to Participate, Sedation  Level of consciousness: sedated  Post-procedure vital signs: reviewed and stable  Pain management: adequate  Airway patency: patent    PONV status at discharge: No PONV  Anesthetic complications: no      Cardiovascular status: hemodynamically stable  Respiratory status: intubated  Hydration status: euvolemic  Follow-up not needed.              Vitals Value Taken Time   BP 96/52 02/25/25 08:02   Temp 36.3 °C (97.4 °F) 02/25/25 07:01   Pulse 58 02/25/25 08:03   Resp 19 02/25/25 08:03   SpO2 100 % 02/25/25 08:03   Vitals shown include unfiled device data.      No case tracking events are documented in the log.      Pain/Ketty Score: Pain Rating Prior to Med Admin: 6 (2/25/2025  2:23 AM)

## 2025-02-25 NOTE — PLAN OF CARE
SICU PLAN OF CARE    Dx: Coronary artery disease involving native coronary artery of native heart    Goals of Care: MAP 60-80, SBP<140    Vital Signs (last 12 hours):   Temp:  [96.7 °F (35.9 °C)-97.4 °F (36.3 °C)]   Pulse:  [58-80]   Resp:  [16-34]   BP: ()/(52-66)   SpO2:  [96 %-100 %]   Arterial Line BP: ()/(42-69)      Neuro: AAOx4, Follows commands , and Moves all extremities spontaneously     Cardiac: normal sinus rhythm    Respiratory: Room Air    Gtts: Insulin    Urine Output: Urethral Catheter  600 mL/shift    Drains: Chest Tube, total output 80mL/shift  (Pleural 10, med 70)    Diet: Cardiac  and 1500mL Fluid Restriction     Labs/Accuchecks: accuchecks Q4, Q 12 K+, daily labs    Skin:  No skin breakdown.  .     Shift Events: See flowsheet for further assessment/details.  MD updated on current condition, vitals, labs, and gtts.           Problem: Adult Inpatient Plan of Care  Goal: Plan of Care Review  Outcome: Progressing  Goal: Optimal Comfort and Wellbeing  Outcome: Progressing  Goal: Readiness for Transition of Care  Outcome: Progressing

## 2025-02-25 NOTE — SUBJECTIVE & OBJECTIVE
"Follow-up For: Procedure(s) (LRB):  CORONARY ARTERY BYPASS GRAFT x1 (CABG) (N/A)    Post-Operative Day: * Day of Surgery *     Past Medical History:   Diagnosis Date    GERD (gastroesophageal reflux disease)     Herpes simplex without mention of complication     Hyperlipidemia     Hypertension     Ulcer     Vitamin D deficiency        Past Surgical History:   Procedure Laterality Date    ANGIOGRAM, CORONARY ARTERY - Right Right 2025     SECTION  1998    x 1    COLONOSCOPY N/A 2021    Procedure: COLONOSCOPY;  Surgeon: Jesu Canas MD;  Location: [facility] ENDO;  Service: Endoscopy;  Laterality: N/A;  covid  stbernard -ml    CORONARY ANGIOGRAPHY Right 2025    Procedure: ANGIOGRAM, CORONARY ARTERY;  Surgeon: Mauro Delaney MD;  Location: [facility] CATH LAB;  Service: Cardiology;  Laterality: Right;    ESOPHAGOGASTRODUODENOSCOPY  2022    ESOPHAGOGASTRODUODENOSCOPY N/A 2022    Procedure: EGD (ESOPHAGOGASTRODUODENOSCOPY);  Surgeon: Bhupendra Delacruz MD;  Location: [facility] ENDO;  Service: Endoscopy;  Laterality: N/A;    TUBAL LIGATION         Review of patient's allergies indicates:   Allergen Reactions    Nexium [esomeprazole magnesium] Hives     Patient says that began having "break-outs" on her abdomen    Lisinopril Other (See Comments)     Cough        Family History       Problem Relation (Age of Onset)    COPD Maternal Grandfather    Diabetes Maternal Grandmother    Heart disease Maternal Grandmother, Maternal Grandfather    Hyperlipidemia Maternal Grandfather    Hypertension Maternal Grandfather    Kidney disease Maternal Grandmother    Sarcoidosis Mother    Stroke Maternal Grandmother, Maternal Grandfather    Vision loss Maternal Grandmother, Paternal Grandmother    Vitiligo Mother          Tobacco Use    Smoking status: Never    Smokeless tobacco: Never   Substance and Sexual Activity    Alcohol use: Not Currently     Alcohol/week: 1.0 standard drink of alcohol     Types: " 1 Glasses of wine per week     Comment: This is Sometimes with guest or family every two months    Drug use: No    Sexual activity: Yes     Partners: Male     Birth control/protection: None      Review of Systems   Unable to perform ROS: Intubated     Objective:     Vital Signs (Most Recent):  Temp: 97.5 °F (36.4 °C) (02/24/25 2215)  Pulse: 69 (02/24/25 2215)  Resp: 17 (02/24/25 2215)  BP: (!) 157/77 (02/24/25 1155)  SpO2: 100 % (02/24/25 2215) Vital Signs (24h Range):  Temp:  [97.5 °F (36.4 °C)-97.9 °F (36.6 °C)] 97.5 °F (36.4 °C)  Pulse:  [69-78] 69  Resp:  [17] 17  SpO2:  [100 %] 100 %  BP: (157)/(77) 157/77     Weight: 99.8 kg (220 lb)  Body mass index is 38.97 kg/m².      Intake/Output Summary (Last 24 hours) at 2/24/2025 2221  Last data filed at 2/24/2025 2133  Gross per 24 hour   Intake 2355 ml   Output 975 ml   Net 1380 ml          Physical Exam  Vitals reviewed.   Constitutional:       Comments: Intubated, sedated   HENT:      Mouth/Throat:      Comments: ETT  Neck:      Comments: RIJ CVC  Cardiovascular:      Rate and Rhythm: Normal rate and regular rhythm.      Comments: V wires, back up rate 50  Pulmonary:      Comments: intubated  Abdominal:      General: There is no distension.   Genitourinary:     Comments: [place] with clear urine  Musculoskeletal:         General: No swelling.   Skin:     General: Skin is warm and dry.   Neurological:      Comments: sedated            Vents:  Ventilator Initiated: Yes (02/24/25 2202)  Oxygen Concentration (%): 100 (02/24/25 2215)    Lines/Drains/Airways       Central Venous Catheter Line  Duration             Introducer 02/24/25 1659 <1 day              Drain  Duration                  Chest Tube 02/24/25 2044 Tube - 1 Left Pleural 19 Fr. <1 day         Urethral Catheter 02/24/25 1542 Straight-tip;Temperature probe 14 Fr. <1 day         Y Chest Tube 1 and 2 02/24/25 2044 1 Right Mediastinal 19 Fr. 2 Left Mediastinal 19 Fr. <1 day              Airway  Duration           "        Airway - Non-Surgical 02/24/25 1538 <1 day              Arterial Line  Duration             Arterial Line 02/24/25 1531 Left Radial <1 day              Line  Duration                  Pacer Wires 02/24/25 <1 day              Peripheral Intravenous Line  Duration                  Peripheral IV - Single Lumen 02/24/25 1130 20 G Left;Posterior Forearm <1 day         Peripheral IV - Single Lumen 02/24/25 1605 14 G  Left;Anterior Forearm <1 day                    Significant Labs:    CBC/Anemia Profile:  Recent Labs   Lab 02/24/25  2030 02/24/25 2058 02/24/25  2212   HCT 25* 23* 30*        Chemistries:  No results for input(s): "NA", "K", "CL", "CO2", "BUN", "CREATININE", "CALCIUM", "ALBUMIN", "PROT", "BILITOT", "ALKPHOS", "ALT", "AST", "GLUCOSE", "MG", "PHOS" in the last 48 hours.    All pertinent labs within the past 24 hours have been reviewed.    Significant Imaging: I have reviewed all pertinent imaging results/findings within the past 24 hours.  "

## 2025-02-25 NOTE — PLAN OF CARE
SICU PLAN OF CARE    Dx: Coronary artery disease involving native coronary artery of native heart    Goals of Care: MAP 60-80; SBP <140    Vital Signs (last 12 hours):   Temp:  [97.5 °F (36.4 °C)-98 °F (36.7 °C)]   Pulse:  [60-84]   Resp:  [16-27]   BP: ()/(50-92)   SpO2:  [100 %]   Arterial Line BP: ()/(48-81)      Neuro: Follows commands , Moves all extremities spontaneously , and Intubated     Cardiac: normal sinus rhythm    Respiratory: Ventilator; Mode: A/C, 40% FiO2, 5 PEEP    Gtts: Precedex, Insulin, and Cleviprex     Urine Output: External Catheter  610 mL/shift    Drains: Chest Tube, total output 160mL/shift    Diet: NPO      Labs/Accuchecks: Q1 accuchecks    Skin:  skin assessed -- no evidence of new skin breakdown.  Patient turned q2h, bony prominences protected, and mattress inflated/working correctly.     Shift Events:  intra-op 2x defiv for v-fib coming off pump, 2L crystalloid, 355cc cell saver; SICU s/p CABGx1, 500cc albumin for low UOP, clevi started.  See flowsheet for further assessment/details.  Family updated on current condition/plan of care, questions answered, and emotional support provided.  MD updated on current condition, vitals, labs, and gtts.

## 2025-02-26 LAB
ALBUMIN SERPL BCP-MCNC: 2.9 G/DL (ref 3.5–5.2)
ALP SERPL-CCNC: 62 U/L (ref 40–150)
ALT SERPL W/O P-5'-P-CCNC: 13 U/L (ref 10–44)
ANION GAP SERPL CALC-SCNC: 7 MMOL/L (ref 8–16)
APTT PPP: 27.1 SEC (ref 21–32)
AST SERPL-CCNC: 27 U/L (ref 10–40)
BASOPHILS # BLD AUTO: 0.03 K/UL (ref 0–0.2)
BASOPHILS NFR BLD: 0.2 % (ref 0–1.9)
BILIRUB SERPL-MCNC: 0.5 MG/DL (ref 0.1–1)
BUN SERPL-MCNC: 13 MG/DL (ref 6–20)
CALCIUM SERPL-MCNC: 8 MG/DL (ref 8.7–10.5)
CHLORIDE SERPL-SCNC: 112 MMOL/L (ref 95–110)
CO2 SERPL-SCNC: 20 MMOL/L (ref 23–29)
CREAT SERPL-MCNC: 0.5 MG/DL (ref 0.5–1.4)
DIFFERENTIAL METHOD BLD: ABNORMAL
EOSINOPHIL # BLD AUTO: 0 K/UL (ref 0–0.5)
EOSINOPHIL NFR BLD: 0 % (ref 0–8)
ERYTHROCYTE [DISTWIDTH] IN BLOOD BY AUTOMATED COUNT: 14.3 % (ref 11.5–14.5)
EST. GFR  (NO RACE VARIABLE): >60 ML/MIN/1.73 M^2
GLUCOSE SERPL-MCNC: 123 MG/DL (ref 70–110)
HCT VFR BLD AUTO: 26.6 % (ref 37–48.5)
HGB BLD-MCNC: 9.1 G/DL (ref 12–16)
IMM GRANULOCYTES # BLD AUTO: 0.12 K/UL (ref 0–0.04)
IMM GRANULOCYTES NFR BLD AUTO: 0.8 % (ref 0–0.5)
INR PPP: 1.1 (ref 0.8–1.2)
LYMPHOCYTES # BLD AUTO: 2.3 K/UL (ref 1–4.8)
LYMPHOCYTES NFR BLD: 14.6 % (ref 18–48)
MAGNESIUM SERPL-MCNC: 2.1 MG/DL (ref 1.6–2.6)
MCH RBC QN AUTO: 29.9 PG (ref 27–31)
MCHC RBC AUTO-ENTMCNC: 34.2 G/DL (ref 32–36)
MCV RBC AUTO: 88 FL (ref 82–98)
MONOCYTES # BLD AUTO: 1.2 K/UL (ref 0.3–1)
MONOCYTES NFR BLD: 7.6 % (ref 4–15)
NEUTROPHILS # BLD AUTO: 12 K/UL (ref 1.8–7.7)
NEUTROPHILS NFR BLD: 76.8 % (ref 38–73)
NRBC BLD-RTO: 0 /100 WBC
PHOSPHATE SERPL-MCNC: 2.2 MG/DL (ref 2.7–4.5)
PLATELET # BLD AUTO: 226 K/UL (ref 150–450)
PMV BLD AUTO: 9.9 FL (ref 9.2–12.9)
POCT GLUCOSE: 110 MG/DL (ref 70–110)
POCT GLUCOSE: 110 MG/DL (ref 70–110)
POCT GLUCOSE: 112 MG/DL (ref 70–110)
POCT GLUCOSE: 121 MG/DL (ref 70–110)
POCT GLUCOSE: 129 MG/DL (ref 70–110)
POTASSIUM SERPL-SCNC: 4 MMOL/L (ref 3.5–5.1)
POTASSIUM SERPL-SCNC: 4 MMOL/L (ref 3.5–5.1)
POTASSIUM SERPL-SCNC: 4.3 MMOL/L (ref 3.5–5.1)
PROT SERPL-MCNC: 5.9 G/DL (ref 6–8.4)
PROTHROMBIN TIME: 12.2 SEC (ref 9–12.5)
RBC # BLD AUTO: 3.04 M/UL (ref 4–5.4)
SODIUM SERPL-SCNC: 139 MMOL/L (ref 136–145)
WBC # BLD AUTO: 15.6 K/UL (ref 3.9–12.7)

## 2025-02-26 PROCEDURE — 85610 PROTHROMBIN TIME: CPT | Performed by: PHYSICIAN ASSISTANT

## 2025-02-26 PROCEDURE — 36415 COLL VENOUS BLD VENIPUNCTURE: CPT | Performed by: PHYSICIAN ASSISTANT

## 2025-02-26 PROCEDURE — 25000003 PHARM REV CODE 250: Performed by: THORACIC SURGERY (CARDIOTHORACIC VASCULAR SURGERY)

## 2025-02-26 PROCEDURE — 94761 N-INVAS EAR/PLS OXIMETRY MLT: CPT

## 2025-02-26 PROCEDURE — 84100 ASSAY OF PHOSPHORUS: CPT | Performed by: PHYSICIAN ASSISTANT

## 2025-02-26 PROCEDURE — 97530 THERAPEUTIC ACTIVITIES: CPT

## 2025-02-26 PROCEDURE — 25000003 PHARM REV CODE 250

## 2025-02-26 PROCEDURE — 85025 COMPLETE CBC W/AUTO DIFF WBC: CPT | Performed by: PHYSICIAN ASSISTANT

## 2025-02-26 PROCEDURE — 20600001 HC STEP DOWN PRIVATE ROOM

## 2025-02-26 PROCEDURE — 97535 SELF CARE MNGMENT TRAINING: CPT

## 2025-02-26 PROCEDURE — 85730 THROMBOPLASTIN TIME PARTIAL: CPT | Performed by: PHYSICIAN ASSISTANT

## 2025-02-26 PROCEDURE — 83735 ASSAY OF MAGNESIUM: CPT | Performed by: PHYSICIAN ASSISTANT

## 2025-02-26 PROCEDURE — 97116 GAIT TRAINING THERAPY: CPT

## 2025-02-26 PROCEDURE — 63600175 PHARM REV CODE 636 W HCPCS: Performed by: PHYSICIAN ASSISTANT

## 2025-02-26 PROCEDURE — 25000003 PHARM REV CODE 250: Performed by: PHYSICIAN ASSISTANT

## 2025-02-26 PROCEDURE — 80053 COMPREHEN METABOLIC PANEL: CPT | Performed by: STUDENT IN AN ORGANIZED HEALTH CARE EDUCATION/TRAINING PROGRAM

## 2025-02-26 PROCEDURE — 99232 SBSQ HOSP IP/OBS MODERATE 35: CPT | Mod: ,,, | Performed by: ANESTHESIOLOGY

## 2025-02-26 PROCEDURE — 84132 ASSAY OF SERUM POTASSIUM: CPT | Mod: 91 | Performed by: PHYSICIAN ASSISTANT

## 2025-02-26 PROCEDURE — 63600175 PHARM REV CODE 636 W HCPCS

## 2025-02-26 RX ORDER — METOPROLOL TARTRATE 25 MG/1
12.5 TABLET ORAL 2 TIMES DAILY
Status: DISCONTINUED | OUTPATIENT
Start: 2025-02-26 | End: 2025-02-28

## 2025-02-26 RX ORDER — FAMOTIDINE 20 MG/1
20 TABLET, FILM COATED ORAL 2 TIMES DAILY
Status: DISCONTINUED | OUTPATIENT
Start: 2025-02-26 | End: 2025-02-28 | Stop reason: HOSPADM

## 2025-02-26 RX ORDER — INSULIN ASPART 100 [IU]/ML
0-10 INJECTION, SOLUTION INTRAVENOUS; SUBCUTANEOUS
Status: DISCONTINUED | OUTPATIENT
Start: 2025-02-26 | End: 2025-02-27

## 2025-02-26 RX ORDER — METHOCARBAMOL 500 MG/1
500 TABLET, FILM COATED ORAL 3 TIMES DAILY
Status: DISCONTINUED | OUTPATIENT
Start: 2025-02-26 | End: 2025-02-28 | Stop reason: HOSPADM

## 2025-02-26 RX ORDER — SODIUM,POTASSIUM PHOSPHATES 280-250MG
2 POWDER IN PACKET (EA) ORAL ONCE
Status: DISCONTINUED | OUTPATIENT
Start: 2025-02-26 | End: 2025-02-27

## 2025-02-26 RX ORDER — ASPIRIN 325 MG
325 TABLET ORAL DAILY
Status: DISCONTINUED | OUTPATIENT
Start: 2025-02-27 | End: 2025-02-28 | Stop reason: HOSPADM

## 2025-02-26 RX ADMIN — ACETAMINOPHEN 1000 MG: 500 TABLET ORAL at 11:02

## 2025-02-26 RX ADMIN — POTASSIUM & SODIUM PHOSPHATES POWDER PACK 280-160-250 MG 2 PACKET: 280-160-250 PACK at 11:02

## 2025-02-26 RX ADMIN — FAMOTIDINE 20 MG: 10 INJECTION, SOLUTION INTRAVENOUS at 08:02

## 2025-02-26 RX ADMIN — POTASSIUM & SODIUM PHOSPHATES POWDER PACK 280-160-250 MG 2 PACKET: 280-160-250 PACK at 04:02

## 2025-02-26 RX ADMIN — AMLODIPINE BESYLATE 5 MG: 5 TABLET ORAL at 08:02

## 2025-02-26 RX ADMIN — POLYETHYLENE GLYCOL 3350 17 G: 17 POWDER, FOR SOLUTION ORAL at 08:02

## 2025-02-26 RX ADMIN — CEFAZOLIN 2 G: 2 INJECTION, POWDER, FOR SOLUTION INTRAMUSCULAR; INTRAVENOUS at 12:02

## 2025-02-26 RX ADMIN — CEFAZOLIN 2 G: 2 INJECTION, POWDER, FOR SOLUTION INTRAMUSCULAR; INTRAVENOUS at 04:02

## 2025-02-26 RX ADMIN — MUPIROCIN 1 G: 20 OINTMENT TOPICAL at 08:02

## 2025-02-26 RX ADMIN — DOCUSATE SODIUM 100 MG: 100 CAPSULE, LIQUID FILLED ORAL at 09:02

## 2025-02-26 RX ADMIN — OXYCODONE 5 MG: 5 TABLET ORAL at 07:02

## 2025-02-26 RX ADMIN — ATORVASTATIN CALCIUM 40 MG: 40 TABLET, FILM COATED ORAL at 09:02

## 2025-02-26 RX ADMIN — MUPIROCIN 1 G: 20 OINTMENT TOPICAL at 09:02

## 2025-02-26 RX ADMIN — METHOCARBAMOL 500 MG: 500 TABLET ORAL at 09:02

## 2025-02-26 RX ADMIN — ASPIRIN 81 MG CHEWABLE TABLET 81 MG: 81 TABLET CHEWABLE at 08:02

## 2025-02-26 RX ADMIN — ACETAMINOPHEN 1000 MG: 500 TABLET ORAL at 07:02

## 2025-02-26 RX ADMIN — ENOXAPARIN SODIUM 40 MG: 40 INJECTION SUBCUTANEOUS at 04:02

## 2025-02-26 RX ADMIN — METHOCARBAMOL 500 MG: 500 TABLET ORAL at 04:02

## 2025-02-26 RX ADMIN — METOPROLOL TARTRATE 12.5 MG: 25 TABLET, FILM COATED ORAL at 11:02

## 2025-02-26 RX ADMIN — OXYCODONE 5 MG: 5 TABLET ORAL at 09:02

## 2025-02-26 RX ADMIN — OXYCODONE 5 MG: 5 TABLET ORAL at 12:02

## 2025-02-26 RX ADMIN — ACETAMINOPHEN 1000 MG: 500 TABLET ORAL at 06:02

## 2025-02-26 RX ADMIN — METOPROLOL TARTRATE 12.5 MG: 25 TABLET, FILM COATED ORAL at 09:02

## 2025-02-26 RX ADMIN — FAMOTIDINE 20 MG: 20 TABLET ORAL at 09:02

## 2025-02-26 RX ADMIN — OXYCODONE 5 MG: 5 TABLET ORAL at 11:02

## 2025-02-26 RX ADMIN — POTASSIUM & SODIUM PHOSPHATES POWDER PACK 280-160-250 MG 2 PACKET: 280-160-250 PACK at 07:02

## 2025-02-26 NOTE — SUBJECTIVE & OBJECTIVE
Interval History/Significant Events: NAEON    Follow-up For: Procedure(s) (LRB):  CORONARY ARTERY BYPASS GRAFT x1 (CABG) (N/A)    Post-Operative Day: 2 Days Post-Op    Objective:     Vital Signs (Most Recent):  Temp: 98.5 °F (36.9 °C) (02/26/25 1101)  Pulse: 88 (02/26/25 1101)  Resp: (!) 22 (02/26/25 1153)  BP: 124/61 (02/26/25 1152)  SpO2: 99 % (02/26/25 1101) Vital Signs (24h Range):  Temp:  [96.8 °F (36 °C)-98.5 °F (36.9 °C)] 98.5 °F (36.9 °C)  Pulse:  [65-90] 88  Resp:  [18-35] 22  SpO2:  [95 %-100 %] 99 %  BP: ()/(52-68) 124/61  Arterial Line BP: ()/(42-69) 130/57     Weight: 104 kg (229 lb 4.5 oz)  Body mass index is 40.61 kg/m².      Intake/Output Summary (Last 24 hours) at 2/26/2025 1222  Last data filed at 2/26/2025 0701  Gross per 24 hour   Intake 533.85 ml   Output 1145 ml   Net -611.15 ml          Physical Exam  Constitutional:       Appearance: Normal appearance. She is normal weight.   HENT:      Head: Normocephalic and atraumatic.      Right Ear: External ear normal.      Left Ear: External ear normal.      Nose: Nose normal.      Mouth/Throat:      Mouth: Mucous membranes are moist.      Pharynx: Oropharynx is clear.   Eyes:      Conjunctiva/sclera: Conjunctivae normal.   Cardiovascular:      Rate and Rhythm: Normal rate and regular rhythm.   Pulmonary:      Effort: Pulmonary effort is normal.   Abdominal:      General: Abdomen is flat.   Musculoskeletal:      Cervical back: Neck supple.      Right lower leg: Edema (trace) present.      Left lower leg: Edema (trace) present.   Skin:     General: Skin is warm and dry.      Capillary Refill: Capillary refill takes less than 2 seconds.   Neurological:      General: No focal deficit present.      Mental Status: She is alert and oriented to person, place, and time. Mental status is at baseline.   Psychiatric:         Mood and Affect: Mood normal.            Vents:  Vent Mode: A/C (02/25/25 7360)  Ventilator Initiated: Yes (02/24/25 2202)  Set  Rate: 16 BPM (02/25/25 0827)  Vt Set: 470 mL (02/25/25 0827)  Pressure Support: 5 cmH20 (02/25/25 0950)  PEEP/CPAP: 5 cmH20 (02/25/25 0950)  Oxygen Concentration (%): 40 (02/25/25 0950)  Peak Airway Pressure: 10 cmH20 (02/25/25 0950)  Plateau Pressure: 0 cmH20 (02/25/25 0950)  Total Ve: 4.95 L/m (02/25/25 0950)  Negative Inspiratory Force (cm H2O): -33 (02/25/25 0950)  F/VT Ratio<105 (RSBI): 125.93 (02/25/25 0950)    Lines/Drains/Airways       Drain  Duration                  Chest Tube 02/24/25 2044 Tube - 1 Left Pleural 19 Fr. 1 day         Y Chest Tube 1 and 2 02/24/25 2044 1 Right Mediastinal 19 Fr. 2 Left Mediastinal 19 Fr. 1 day              Line  Duration                  Pacer Wires 02/24/25 2 days              Peripheral Intravenous Line  Duration                  Peripheral IV - Single Lumen 02/24/25 1605 14 G  Left;Anterior Forearm 1 day         Peripheral IV - Single Lumen 02/26/25 1113 20 G Anterior;Right Forearm <1 day                    Significant Labs:    CBC/Anemia Profile:  Recent Labs   Lab 02/24/25 2207 02/24/25 2212 02/25/25 0344 02/26/25 0316   WBC 21.40*  --  12.40 15.60*   HGB 10.7*  --  9.8* 9.1*   HCT 31.7* 30* 30.1* 26.6*     --  204 226   MCV 92  --  90 88   RDW 13.7  --  13.6 14.3        Chemistries:  Recent Labs   Lab 02/24/25 2207 02/25/25 0344 02/25/25 0946 02/25/25 2016 02/26/25 0316 02/26/25  0836    138  --   --  139  --    K 3.3* 3.9   < > 3.8 4.3 4.0   * 111*  --   --  112*  --    CO2 19* 16*  --   --  20*  --    BUN 10 10  --   --  13  --    CREATININE 0.6 0.6  --   --  0.5  --    CALCIUM 7.9* 8.1*  --   --  8.0*  --    ALBUMIN 2.6* 3.3*  --   --  2.9*  --    PROT 5.8* 6.0  --   --  5.9*  --    BILITOT 0.4 0.5  --   --  0.5  --    ALKPHOS 69 59  --   --  62  --    ALT 15 14  --   --  13  --    AST 31 35  --   --  27  --    MG 2.3 2.0  --   --  2.1  --    PHOS 1.9* 1.8*  --   --  2.2*  --     < > = values in this interval not displayed.       All  pertinent labs within the past 24 hours have been reviewed.    Significant Imaging:  I have reviewed all pertinent imaging results/findings within the past 24 hours.

## 2025-02-26 NOTE — PT/OT/SLP PROGRESS
Physical Therapy Co-Treatment    Patient Name:  Pat Yung   MRN:  [id number]  Admitting Diagnosis:  Coronary artery disease involving native coronary artery of native heart   Recent Surgery: Procedure(s) (LRB):  CORONARY ARTERY BYPASS GRAFT x1 (CABG) (N/A) 2 Days Post-Op  Admit Date: 2/24/2025  Length of Stay: 2 days    Recommendations:     Discharge Recommendations:  No Therapy Indicated    Discharge Equipment Recommendations: none   Barriers to discharge: None    Plan:     During this hospitalization, patient to be seen 5 x/week to address the identified rehab impairments via gait training, therapeutic activities, therapeutic exercises, neuromuscular re-education and progress towards the established goals.  Plan of Care Expires:  03/24/25  Plan of Care Reviewed with: patient, mother    Assessment:     Pat Yung is a 54 y.o. female admitted with a medical diagnosis of Coronary artery disease involving native coronary artery of native heart. Pt found up in chair agreeable to therapy session. Pt demo'd improvements as she increased distance gait trained this session. Pt with some instability during trial requiring HHA for balance assist. Pt remains limited by impaired balance, pain and decreased activity tolerance. Patient would benefit from skilled therapy services to maximize safety and independence, increase activity tolerance, decrease fall risk, decrease caregiver burden, improve QOL, improve patient's functional mobility, and decrease risk of contractures and pressure sores.       Problem List: weakness, impaired endurance, impaired functional mobility, gait instability, decreased safety awareness, pain, impaired balance, edema.  Rehab Prognosis: Good; patient would benefit from acute skilled PT services to address these deficits and reach maximum level of function.      Goals:   Multidisciplinary Problems       Physical Therapy Goals          Problem: Physical Therapy    Goal Priority Disciplines  "Outcome Interventions   Physical Therapy Goal     PT, PT/OT Progressing    Description: Goals to be met by: 3/15/2025    Patient will increase functional independence with mobility by performin. Supine to sit with Supervision  2. Sit to stand transfer with Supervision  3. Gait  x 400 feet with Supervision.                          Subjective     RN notified prior to session. Mother present upon PT entrance into room. Patient agreeable to PT treatment session.    Chief Complaint: chest pain; inability to clear secretions  Patient/Family Comments/goals: go home  Pain/Comfort:  Pain Rating 1: 0/10  Pain Rating Post-Intervention 1: 0/10      Objective:     Additional staff present: OT; OT for cotx due to pt's multiple medical comorbidities and functional deficits req'ing two skilled therapists to appropriately maximize functional potential by progressing pt's musculoskeletal strength and endurance, facilitating neuromuscular postural balance and control ,and accommodating for patient's impaired cardiopulmonary tolerance to activities.     Patient found up in chair with: blood pressure cuff, telemetry, pulse ox (continuous), external pacer, chest tube   Cognition:   Alert and Cooperative  Patient is oriented to Person, Place, Time, Situation  General Precautions: Standard, Cardiac fall, sternal   Orthopedic Precautions:N/A   Braces: N/A   Body mass index is 40.61 kg/m².  Oxygen Device: Room Air  Vitals: /61   Pulse 88   Temp 98.5 °F (36.9 °C)   Resp (!) 22   Ht 5' 3" (1.6 m)   Wt 104 kg (229 lb 4.5 oz)   LMP 2023 (Exact Date)   SpO2 99%   Breastfeeding No   BMI 40.61 kg/m²     Outcome Measures:  AM-PAC 6 CLICK MOBILITY  Turning over in bed (including adjusting bedclothes, sheets and blankets)?: 2  Sitting down on and standing up from a chair with arms (e.g., wheelchair, bedside commode, etc.): 3  Moving from lying on back to sitting on the side of the bed?: 2  Moving to and from a bed to a " chair (including a wheelchair)?: 3  Need to walk in hospital room?: 3  Climbing 3-5 steps with a railing?: 2  Basic Mobility Total Score: 15     Functional Mobility:    Bed Mobility:   Pt found/returned to bedside chair    Transfers:   Sit <> Stand Transfer: contact guard assistance with no assistive device     Balance:  Sitting Balance  Static: contact guard assistance  Pt worked on standing balance and endurance while completing ADLs with OT at the sink with no LOB.  Dynamic: contact guard assistance      Gait:  Patient ambulated: 10' + 80' with 2 brief standing rest breaks (standing task between trials)   Patient required: contact guard  Patient used:  no assistive device and hand-held assist   Gait Deviation(s): occasional unsteady gait, decreased step length, narrow base of support, decreased talha, decreased arm swing, and shuffle gait  Portable monitor utilized  all lines remained intact throughout ambulation trial  Nurse present for vital sign monitoring  Comments: Patient with shuffle gait pattern with verbal cues for increased KATHY, purposeful steps and upright stance. Brief rest breaks taken due to increased chest pain.     Education:  Time provided for education, counseling and discussion of health disposition in regards to patient's current status  All questions answered within PT scope of practice and to patient's satisfaction  PT role in POC to address current functional deficits  Call nursing/pct to transfer to chair/use bathroom. Pt stated understanding.  Sternal Precautions: patient able to voice 0/3 precautions without assistance. Patient able to maintain precautions throughout session with min verbal cues.    Patient left up in chair with all lines intact, call button in reach, RN notified, and mother present.    Time Tracking:     PT Received On: 02/26/25  PT Start Time: 1257     PT Stop Time: 1320  PT Total Time (min): 23 min     Billable Minutes:   Gait Training 13 minutes and Therapeutic  Activity 10 minutes       PT/PTA: PT       2/26/2025

## 2025-02-26 NOTE — PROGRESS NOTES
[facility]  Critical Care - Surgery  Progress Note    Patient Name: Pat Yung  MRN: [id number]  Admission Date: 2/24/2025  Hospital Length of Stay: 2 days  Code Status: Full Code  Attending Provider: Josef Nugent MD  Primary Care Provider: Jero Weinberg MD   Principal Problem: Coronary artery disease involving native coronary artery of native heart    Subjective:     Hospital/ICU Course:  No notes on file    Interval History/Significant Events: NAEON    Follow-up For: Procedure(s) (LRB):  CORONARY ARTERY BYPASS GRAFT x1 (CABG) (N/A)    Post-Operative Day: 2 Days Post-Op    Objective:     Vital Signs (Most Recent):  Temp: 98.5 °F (36.9 °C) (02/26/25 1101)  Pulse: 88 (02/26/25 1101)  Resp: (!) 22 (02/26/25 1153)  BP: 124/61 (02/26/25 1152)  SpO2: 99 % (02/26/25 1101) Vital Signs (24h Range):  Temp:  [96.8 °F (36 °C)-98.5 °F (36.9 °C)] 98.5 °F (36.9 °C)  Pulse:  [65-90] 88  Resp:  [18-35] 22  SpO2:  [95 %-100 %] 99 %  BP: ()/(52-68) 124/61  Arterial Line BP: ()/(42-69) 130/57     Weight: 104 kg (229 lb 4.5 oz)  Body mass index is 40.61 kg/m².      Intake/Output Summary (Last 24 hours) at 2/26/2025 1222  Last data filed at 2/26/2025 0701  Gross per 24 hour   Intake 533.85 ml   Output 1145 ml   Net -611.15 ml          Physical Exam  Constitutional:       Appearance: Normal appearance. She is normal weight.   HENT:      Head: Normocephalic and atraumatic.      Right Ear: External ear normal.      Left Ear: External ear normal.      Nose: Nose normal.      Mouth/Throat:      Mouth: Mucous membranes are moist.      Pharynx: Oropharynx is clear.   Eyes:      Conjunctiva/sclera: Conjunctivae normal.   Cardiovascular:      Rate and Rhythm: Normal rate and regular rhythm.   Pulmonary:      Effort: Pulmonary effort is normal.   Abdominal:      General: Abdomen is flat.   Musculoskeletal:      Cervical back: Neck supple.      Right lower leg: Edema (trace) present.      Left  lower leg: Edema (trace) present.   Skin:     General: Skin is warm and dry.      Capillary Refill: Capillary refill takes less than 2 seconds.   Neurological:      General: No focal deficit present.      Mental Status: She is alert and oriented to person, place, and time. Mental status is at baseline.   Psychiatric:         Mood and Affect: Mood normal.            Vents:  Vent Mode: A/C (02/25/25 0827)  Ventilator Initiated: Yes (02/24/25 2202)  Set Rate: 16 BPM (02/25/25 0827)  Vt Set: 470 mL (02/25/25 0827)  Pressure Support: 5 cmH20 (02/25/25 0950)  PEEP/CPAP: 5 cmH20 (02/25/25 0950)  Oxygen Concentration (%): 40 (02/25/25 0950)  Peak Airway Pressure: 10 cmH20 (02/25/25 0950)  Plateau Pressure: 0 cmH20 (02/25/25 0950)  Total Ve: 4.95 L/m (02/25/25 0950)  Negative Inspiratory Force (cm H2O): -33 (02/25/25 0950)  F/VT Ratio<105 (RSBI): 125.93 (02/25/25 0950)    Lines/Drains/Airways       Drain  Duration                  Chest Tube 02/24/25 2044 Tube - 1 Left Pleural 19 Fr. 1 day         Y Chest Tube 1 and 2 02/24/25 2044 1 Right Mediastinal 19 Fr. 2 Left Mediastinal 19 Fr. 1 day              Line  Duration                  Pacer Wires 02/24/25 2 days              Peripheral Intravenous Line  Duration                  Peripheral IV - Single Lumen 02/24/25 1605 14 G  Left;Anterior Forearm 1 day         Peripheral IV - Single Lumen 02/26/25 1113 20 G Anterior;Right Forearm <1 day                    Significant Labs:    CBC/Anemia Profile:  Recent Labs   Lab 02/24/25 2207 02/24/25 2212 02/25/25  0344 02/26/25  0316   WBC 21.40*  --  12.40 15.60*   HGB 10.7*  --  9.8* 9.1*   HCT 31.7* 30* 30.1* 26.6*     --  204 226   MCV 92  --  90 88   RDW 13.7  --  13.6 14.3        Chemistries:  Recent Labs   Lab 02/24/25 2207 02/25/25  0344 02/25/25  0946 02/25/25 2016 02/26/25 0316 02/26/25  0836    138  --   --  139  --    K 3.3* 3.9   < > 3.8 4.3 4.0   * 111*  --   --  112*  --    CO2 19* 16*  --   --   20*  --    BUN 10 10  --   --  13  --    CREATININE 0.6 0.6  --   --  0.5  --    CALCIUM 7.9* 8.1*  --   --  8.0*  --    ALBUMIN 2.6* 3.3*  --   --  2.9*  --    PROT 5.8* 6.0  --   --  5.9*  --    BILITOT 0.4 0.5  --   --  0.5  --    ALKPHOS 69 59  --   --  62  --    ALT 15 14  --   --  13  --    AST 31 35  --   --  27  --    MG 2.3 2.0  --   --  2.1  --    PHOS 1.9* 1.8*  --   --  2.2*  --     < > = values in this interval not displayed.       All pertinent labs within the past 24 hours have been reviewed.    Significant Imaging:  I have reviewed all pertinent imaging results/findings within the past 24 hours.  Assessment/Plan:     Cardiac/Vascular  * Coronary artery disease involving native coronary artery of native heart  S/p CABG x1 (LIMA-LAD) w/ Dr. Nugent on 2/24. Required Defib x2 for Vfib coming off pump. No issues since. Briefly on low dose levo + epi, admitted to SICU on 0.02 epi, stable.    Neuro/Psych:   - Sedation: none  - Pain:     - Scheduled Tylenol 1g q8h    - oxy 5 prn q3h                Cardiac:   - BP Goal: MAP 60-80  - Pressors/Inotropes: none  - Rhythm: NSR  - Anti-HTNs: norvasc 5  - Beta blocker: lopressor 12.5 bid  - Statin: Atorvastatin 40 mg QD    Pulmonary:   - Goal SpO2 >92; on RA  - ABGs PRN  - Chest Tubes x 3 (2 Meds & 1 Pleural) -discontinued 2 meds       Renal:    - Maintain [place], record strict Is/Os  Recent Labs   Lab 02/24/25  2207 02/25/25  0344 02/26/25  0316   BUN 10 10 13   CREATININE 0.6 0.6 0.5         FEN / GI:     - Daily CMP, Mag/Phos     - Replace electrolytes as needed    - Nutrition: cardiac diet     - Bowel Regimen: Miralax, docusate    - Famotidine BID     ID:   - Afebrile  - Abx: Complete perioperative cefazolin 2g Q8H x 5 doses  Recent Labs   Lab 02/24/25 2207 02/25/25  0344 02/26/25  0316   WBC 21.40* 12.40 15.60*         Heme/Onc:   - Hgb 13.2 pre-operatively  - CBC, PTT/INR daily  - DVT ppx: SCDs and lovenox   - ASA 325mg daily  Recent Labs   Lab 02/24/25 2207  02/25/25  0344 02/26/25  0316   HGB 10.7* 9.8* 9.1*    204 226   APTT 25.6 26.0 27.1   INR 1.1 1.1 1.1         Endocrine:   - CTS Goal -140  - HgbA1c: 5.6%  - Endocrinology consulted for insulin management  - Insulin ss     PPx:   Feeding: cardiac diet   Analgesia/Sedation: MMPC  Thromboembolic Prevention: SCDs and lov   HOB >30: Yes  Stress Ulcer: famotidine BID  Glucose Control: Yes, insulin management per Endocrinology     Lines/Drains/Airway:  ETT  Left radial arterial line - d/c'd 2/26  RIJ CVC - d/c'd 2/26  [place] - d/c'd 2/26  Chest Tubes: 3 (2 Mediastinal, 1 Pleural) - 2 meds d/c'd 2/26  Pacing Wires: Temporary ventricular pacing wires     Dispo/Code Status/Palliative:    - SICU   - Full Code           Critical secondary to Patient has a condition that poses threat to life and bodily function: post-operative cabg      Critical care was time spent personally by me on the following activities: development of treatment plan with patient or surrogate and bedside caregivers, discussions with consultants, evaluation of patient's response to treatment, examination of patient, ordering and performing treatments and interventions, ordering and review of laboratory studies, ordering and review of radiographic studies, pulse oximetry, re-evaluation of patient's condition.  This critical care time did not overlap with that of any other provider or involve time for any procedures.     Santa Connolly MD  Critical Care - Surgery  [facility]

## 2025-02-26 NOTE — ASSESSMENT & PLAN NOTE
S/p CABG x1 (RUANO-LAD) w/ Dr. Nugent on 2/24. Required Defib x2 for Vfib coming off pump. No issues since. Briefly on low dose levo + epi, admitted to SICU on 0.02 epi, stable.    Neuro/Psych:   - Sedation: none  - Pain:     - Scheduled Tylenol 1g q8h    - oxy 5 prn q3h                Cardiac:   - BP Goal: MAP 60-80  - Pressors/Inotropes: none  - Rhythm: NSR  - Anti-HTNs: norvasc 5  - Beta blocker: lopressor 12.5 bid  - Statin: Atorvastatin 40 mg QD    Pulmonary:   - Goal SpO2 >92; on RA  - ABGs PRN  - Chest Tubes x 3 (2 Meds & 1 Pleural) -discontinued 2 meds       Renal:    - Maintain [place], record strict Is/Os  Recent Labs   Lab 02/24/25 2207 02/25/25  0344 02/26/25  0316   BUN 10 10 13   CREATININE 0.6 0.6 0.5         FEN / GI:     - Daily CMP, Mag/Phos     - Replace electrolytes as needed    - Nutrition: cardiac diet     - Bowel Regimen: Miralax, docusate    - Famotidine BID     ID:   - Afebrile  - Abx: Complete perioperative cefazolin 2g Q8H x 5 doses  Recent Labs   Lab 02/24/25 2207 02/25/25  0344 02/26/25  0316   WBC 21.40* 12.40 15.60*         Heme/Onc:   - Hgb 13.2 pre-operatively  - CBC, PTT/INR daily  - DVT ppx: SCDs and lovenox   - ASA 325mg daily  Recent Labs   Lab 02/24/25 2207 02/25/25  0344 02/26/25  0316   HGB 10.7* 9.8* 9.1*    204 226   APTT 25.6 26.0 27.1   INR 1.1 1.1 1.1         Endocrine:   - CTS Goal -140  - HgbA1c: 5.6%  - Endocrinology consulted for insulin management  - Insulin ss     PPx:   Feeding: cardiac diet   Analgesia/Sedation: MMPC  Thromboembolic Prevention: SCDs and lov   HOB >30: Yes  Stress Ulcer: famotidine BID  Glucose Control: Yes, insulin management per Endocrinology     Lines/Drains/Airway:  ETT  Left radial arterial line - d/c'd 2/26  JARRELL CVC - d/c'd 2/26  [place] - d/c'd 2/26  Chest Tubes: 3 (2 Mediastinal, 1 Pleural) - 2 meds d/c'd 2/26  Pacing Wires: Temporary ventricular pacing wires     Dispo/Code Status/Palliative:    - SICU   - Full Code

## 2025-02-26 NOTE — PT/OT/SLP PROGRESS
"Occupational Therapy  Co-Treatment    Name: Pat Yung  MRN: [id number]  Admitting Diagnosis:  Coronary artery disease involving native coronary artery of native heart  2 Days Post-Op    Recommendations:     Discharge Recommendations: No Therapy Indicated  Discharge Equipment Recommendations:  shower chair  Barriers to discharge:  None    Assessment:     Pat Yung is a 54 y.o. female with a medical diagnosis of Coronary artery disease involving native coronary artery of native heart.  She presents with the following performance deficits affecting function: weakness, impaired endurance, impaired self care skills, impaired functional mobility, gait instability, impaired balance, impaired skin.    Pt agreeable to session, motivated to participate, and tolerated well. Pt demonstrating increased activity tolerance as compared to previous sessions, indicated by greater distances achieved with functional mobility tasks this date. Pt would continue to benefit from skilled OT services in the acute care setting to improve activity tolerance and functional endurance, increase participation in self-care routines, improve functional strength needed for safety with functional transfers and mobility, and facilitate a return to PLOF and least restrictive home environment.      Rehab Prognosis:  Good; patient would benefit from acute skilled OT services to address these deficits and reach maximum level of function.       Plan:     Patient to be seen 5 x/week to address the above listed problems via self-care/home management, therapeutic activities, therapeutic exercises, neuromuscular re-education  Plan of Care Expires: 03/25/25  Plan of Care Reviewed with: patient, mother    Subjective     Chief Complaint: coughing, mucous production  Patient/Family Comments/goals: "I've been feeling good - it's just this cough has been bothering me."  Pain/Comfort:  Pain Rating 1: 0/10    Objective:     Communicated with: Nursing prior " to session.  Patient found up in chair with blood pressure cuff, telemetry, pulse ox (continuous), external pacer, chest tube upon OT entry to room.    General Precautions: Standard, fall, sternal    Orthopedic Precautions:N/A  Braces: N/A  Respiratory Status: Room air     Occupational Performance:     Bed Mobility:    Not observed secondary to pt up in chair upon entry/exit      Functional Mobility/Transfers:  Patient completed Sit <> Stand Transfer with contact guard assistance  with  no assistive device   Functional Mobility: Pt able to tolerate ambulating throughout room and hallway to simulate household/community distances ~10 ft from recliner chair to sink then ~80 ft in hallway with no AD and CGA provided for safety and line management. No SOB or LOB noted.     Activities of Daily Living:  Grooming: stand by assistance to wash face with warm wash cloth in standing at sink  Bathing: maximal assistance for wiping legs after experiencing mild incontinence episode during coughing bout  Upper Body Dressing: minimum assistance to don gown around backside  Lower Body Dressing: maximal assistance to doff soiled personal  socks and don fresh pair of personal  socks      [facility] 6 Click ADL: 16    Treatment & Education:  Provided education on the role of OT, POC, and therapy goals while in the acute care setting.   Provided education on the importance of continued mobilization and participation in OOB activities to increase functional endurance and activity tolerance for increased participation in ADL routines.   Provided education on safe transfer techniques and proper hand placement to promote safety awareness and prevent falls with functional transfers.   Sternal precautions and application to functional tasks / ADLs reviewed: including no lifting > 5 lbs, pulling or pushing with [place]  Discussed modifying daily activities and functional mobility tasks to maintain sternal precautions appropriately and the  importance of participation in therapy and engaging in increased OOB mobility with assistance to improve endurance  All questions/concerns within the scope of OT answered/addressed - pt verbalized understanding.   Instructed pt to call for assistance when wanting to ambulate or participate in OOB activities to promote safety and prevent falls.   White board updated.    Co-treatment performed to appropriately and safely assess patient's strength, endurance, functional mobility, and ADL performance while facilitating functional tasks in addition to accommodating for patient's activity tolerance and medical acuity.    Patient left up in chair with all lines intact, call button in reach, nursing notified, and mother present    GOALS:   Multidisciplinary Problems       Occupational Therapy Goals          Problem: Occupational Therapy    Goal Priority Disciplines Outcome Interventions   Occupational Therapy Goal     OT, PT/OT Progressing    Description: Goals to be met by: 3/25/25 (1 mo)     Patient will increase functional independence with ADLs by performing:    UE Dressing with [place].  LE Dressing with [place].  Grooming while standing at sink with [place].  Toileting from toilet with [place] for hygiene and clothing management.   Rolling to Bilateral with [place].   Supine to sit with [place].  Step transfer with [place]  Toilet transfer to toilet with [place].                         DME Justifications:  No DME recommended requiring DME justifications    Time Tracking:     OT Date of Treatment: 02/26/25  OT Start Time: 1257  OT Stop Time: 1320  OT Total Time (min): 23 min    Billable Minutes:Self Care/Home Management 13 minutes  Therapeutic Activity 10 minutes    OT/SARAH: OT          2/26/2025

## 2025-02-26 NOTE — PLAN OF CARE
"      SICU PLAN OF CARE NOTE    Dx: Coronary artery disease involving native coronary artery of native heart    Shift Events: NAEON.    Goals of Care: MAP 60-80. SBP<140.    Neuro: AAO x4, Follows Commands, and Moves All Extremities    Vital Signs: BP (!) 114/59 (BP Location: Right arm, Patient Position: Lying)   Pulse 84   Temp 98 °F (36.7 °C) (Oral)   Resp (!) 35   Ht 5' 3" (1.6 m)   Wt 104 kg (229 lb 4.5 oz)   LMP 09/29/2023 (Exact Date)   SpO2 97%   Breastfeeding No   BMI 40.61 kg/m²     Cardiac: NSR    Respiratory: Room Air    Diet: Clear Liquid    Urine Output: Urinary Catheter 30 cc/hour    Drains: Chest Tube, total output 5 cc /  hour  Labs/Accuchecks: Daily labs. Q4 accucheck    Skin: 4 eyes completed.     View flowsheet for more assessment details.    "

## 2025-02-26 NOTE — CARE UPDATE
-Glucose Goal 110-140    -A1C:   Hemoglobin A1C   Date Value Ref Range Status   02/20/2025 5.6 4.0 - 5.6 % Final     Comment:     ADA Screening Guidelines:  5.7-6.4%  Consistent with prediabetes  >or=6.5%  Consistent with diabetes    High levels of fetal hemoglobin interfere with the HbA1C  assay. Heterozygous hemoglobin variants (HbS, HgC, etc)do  not significantly interfere with this assay.   However, presence of multiple variants may affect accuracy.           -HOME REGIMEN: No hx of DM per chart review     -GLUCOSE TREND FOR THE PAST 24HRS:   Recent Labs   Lab 02/25/25  1622 02/25/25 2015 02/25/25  2053 02/26/25  0008 02/26/25  0417 02/26/25  0836   POCTGLUCOSE 104 117* 120* 110 129* 112*         -NO HYPOGYCEMIAS NOTED     - Diet  Diet Cardiac Fluid - 1500mL    -TOLERATING 50 % OF PO DIET       Plan:   - MDC SSI PRN   - POCT Glucose ac/hs   - Hypoglycemia protocol in place      ** Please notify Endocrine for any change and/or advance in diet**  ** Please call Endocrine for any BG related issues **     Discharge Planning:   TBD. Please notify endocrine prior to discharge.

## 2025-02-27 PROBLEM — D62 ACUTE BLOOD LOSS ANEMIA: Status: ACTIVE | Noted: 2025-02-27

## 2025-02-27 PROBLEM — E83.39 HYPOPHOSPHATEMIA: Status: ACTIVE | Noted: 2025-02-27

## 2025-02-27 LAB
ALBUMIN SERPL BCP-MCNC: 2.8 G/DL (ref 3.5–5.2)
ALP SERPL-CCNC: 85 U/L (ref 40–150)
ALT SERPL W/O P-5'-P-CCNC: 13 U/L (ref 10–44)
ANION GAP SERPL CALC-SCNC: 7 MMOL/L (ref 8–16)
APTT PPP: 27.1 SEC (ref 21–32)
AST SERPL-CCNC: 27 U/L (ref 10–40)
BASOPHILS # BLD AUTO: 0.09 K/UL (ref 0–0.2)
BASOPHILS NFR BLD: 0.6 % (ref 0–1.9)
BILIRUB SERPL-MCNC: 0.5 MG/DL (ref 0.1–1)
BUN SERPL-MCNC: 12 MG/DL (ref 6–20)
CALCIUM SERPL-MCNC: 8.5 MG/DL (ref 8.7–10.5)
CHLORIDE SERPL-SCNC: 108 MMOL/L (ref 95–110)
CO2 SERPL-SCNC: 24 MMOL/L (ref 23–29)
CREAT SERPL-MCNC: 0.6 MG/DL (ref 0.5–1.4)
DIFFERENTIAL METHOD BLD: ABNORMAL
EOSINOPHIL # BLD AUTO: 0 K/UL (ref 0–0.5)
EOSINOPHIL NFR BLD: 0.2 % (ref 0–8)
ERYTHROCYTE [DISTWIDTH] IN BLOOD BY AUTOMATED COUNT: 14.7 % (ref 11.5–14.5)
EST. GFR  (NO RACE VARIABLE): >60 ML/MIN/1.73 M^2
GLUCOSE SERPL-MCNC: 98 MG/DL (ref 70–110)
HCT VFR BLD AUTO: 26.7 % (ref 37–48.5)
HGB BLD-MCNC: 8.7 G/DL (ref 12–16)
IMM GRANULOCYTES # BLD AUTO: 0.06 K/UL (ref 0–0.04)
IMM GRANULOCYTES NFR BLD AUTO: 0.4 % (ref 0–0.5)
INR PPP: 1 (ref 0.8–1.2)
LYMPHOCYTES # BLD AUTO: 4 K/UL (ref 1–4.8)
LYMPHOCYTES NFR BLD: 24.6 % (ref 18–48)
MAGNESIUM SERPL-MCNC: 2.2 MG/DL (ref 1.6–2.6)
MCH RBC QN AUTO: 30.2 PG (ref 27–31)
MCHC RBC AUTO-ENTMCNC: 32.6 G/DL (ref 32–36)
MCV RBC AUTO: 93 FL (ref 82–98)
MONOCYTES # BLD AUTO: 1.5 K/UL (ref 0.3–1)
MONOCYTES NFR BLD: 9.1 % (ref 4–15)
NEUTROPHILS # BLD AUTO: 10.6 K/UL (ref 1.8–7.7)
NEUTROPHILS NFR BLD: 65.1 % (ref 38–73)
NRBC BLD-RTO: 0 /100 WBC
PHOSPHATE SERPL-MCNC: 1.9 MG/DL (ref 2.7–4.5)
PLATELET # BLD AUTO: 225 K/UL (ref 150–450)
PMV BLD AUTO: 10 FL (ref 9.2–12.9)
POTASSIUM SERPL-SCNC: 3.9 MMOL/L (ref 3.5–5.1)
PROT SERPL-MCNC: 6.3 G/DL (ref 6–8.4)
PROTHROMBIN TIME: 11.2 SEC (ref 9–12.5)
RBC # BLD AUTO: 2.88 M/UL (ref 4–5.4)
SODIUM SERPL-SCNC: 139 MMOL/L (ref 136–145)
WBC # BLD AUTO: 16.21 K/UL (ref 3.9–12.7)

## 2025-02-27 PROCEDURE — 83735 ASSAY OF MAGNESIUM: CPT | Performed by: PHYSICIAN ASSISTANT

## 2025-02-27 PROCEDURE — 97535 SELF CARE MNGMENT TRAINING: CPT

## 2025-02-27 PROCEDURE — 85610 PROTHROMBIN TIME: CPT | Performed by: PHYSICIAN ASSISTANT

## 2025-02-27 PROCEDURE — 25000003 PHARM REV CODE 250

## 2025-02-27 PROCEDURE — 25000003 PHARM REV CODE 250: Performed by: PHYSICIAN ASSISTANT

## 2025-02-27 PROCEDURE — 85730 THROMBOPLASTIN TIME PARTIAL: CPT | Performed by: PHYSICIAN ASSISTANT

## 2025-02-27 PROCEDURE — 97530 THERAPEUTIC ACTIVITIES: CPT

## 2025-02-27 PROCEDURE — 80053 COMPREHEN METABOLIC PANEL: CPT | Performed by: STUDENT IN AN ORGANIZED HEALTH CARE EDUCATION/TRAINING PROGRAM

## 2025-02-27 PROCEDURE — 36415 COLL VENOUS BLD VENIPUNCTURE: CPT | Performed by: PHYSICIAN ASSISTANT

## 2025-02-27 PROCEDURE — 85025 COMPLETE CBC W/AUTO DIFF WBC: CPT | Performed by: PHYSICIAN ASSISTANT

## 2025-02-27 PROCEDURE — 20600001 HC STEP DOWN PRIVATE ROOM

## 2025-02-27 PROCEDURE — 25000003 PHARM REV CODE 250: Performed by: THORACIC SURGERY (CARDIOTHORACIC VASCULAR SURGERY)

## 2025-02-27 PROCEDURE — 63600175 PHARM REV CODE 636 W HCPCS

## 2025-02-27 PROCEDURE — 84100 ASSAY OF PHOSPHORUS: CPT | Performed by: PHYSICIAN ASSISTANT

## 2025-02-27 RX ORDER — ACETAMINOPHEN 500 MG
1000 TABLET ORAL EVERY 6 HOURS PRN
Qty: 30 TABLET | Refills: 0 | Status: SHIPPED | OUTPATIENT
Start: 2025-02-27

## 2025-02-27 RX ORDER — FUROSEMIDE 20 MG/1
20 TABLET ORAL 2 TIMES DAILY
Status: DISCONTINUED | OUTPATIENT
Start: 2025-02-27 | End: 2025-02-28 | Stop reason: HOSPADM

## 2025-02-27 RX ORDER — OXYCODONE HYDROCHLORIDE 5 MG/1
5 TABLET ORAL EVERY 4 HOURS PRN
Qty: 42 TABLET | Refills: 0 | Status: SHIPPED | OUTPATIENT
Start: 2025-02-27 | End: 2025-03-07

## 2025-02-27 RX ORDER — METOPROLOL TARTRATE 25 MG/1
12.5 TABLET, FILM COATED ORAL 2 TIMES DAILY
Qty: 30 TABLET | Refills: 11 | Status: SHIPPED | OUTPATIENT
Start: 2025-02-27 | End: 2025-02-28 | Stop reason: HOSPADM

## 2025-02-27 RX ORDER — DOCUSATE SODIUM 100 MG/1
100 CAPSULE, LIQUID FILLED ORAL 2 TIMES DAILY PRN
Qty: 14 CAPSULE | Refills: 1 | Status: SHIPPED | OUTPATIENT
Start: 2025-02-27

## 2025-02-27 RX ORDER — POTASSIUM CHLORIDE 750 MG/1
10 CAPSULE, EXTENDED RELEASE ORAL 2 TIMES DAILY
Status: DISCONTINUED | OUTPATIENT
Start: 2025-02-27 | End: 2025-02-28 | Stop reason: HOSPADM

## 2025-02-27 RX ORDER — AMLODIPINE BESYLATE 5 MG/1
5 TABLET ORAL DAILY
Qty: 30 TABLET | Refills: 3 | Status: SHIPPED | OUTPATIENT
Start: 2025-02-27

## 2025-02-27 RX ORDER — METHOCARBAMOL 500 MG/1
500 TABLET, FILM COATED ORAL 3 TIMES DAILY
Qty: 30 TABLET | Refills: 0 | Status: SHIPPED | OUTPATIENT
Start: 2025-02-27 | End: 2025-03-10

## 2025-02-27 RX ORDER — FUROSEMIDE 20 MG/1
TABLET ORAL
Qty: 45 TABLET | Refills: 3 | Status: SHIPPED | OUTPATIENT
Start: 2025-02-27 | End: 2025-06-04

## 2025-02-27 RX ORDER — POTASSIUM CHLORIDE 20 MEQ/1
TABLET, EXTENDED RELEASE ORAL
Qty: 45 TABLET | Refills: 3 | Status: SHIPPED | OUTPATIENT
Start: 2025-02-27 | End: 2025-06-04

## 2025-02-27 RX ADMIN — ASPIRIN 325 MG ORAL TABLET 325 MG: 325 PILL ORAL at 08:02

## 2025-02-27 RX ADMIN — METHOCARBAMOL 500 MG: 500 TABLET ORAL at 03:02

## 2025-02-27 RX ADMIN — DOCUSATE SODIUM 100 MG: 100 CAPSULE, LIQUID FILLED ORAL at 08:02

## 2025-02-27 RX ADMIN — METOPROLOL TARTRATE 12.5 MG: 25 TABLET, FILM COATED ORAL at 08:02

## 2025-02-27 RX ADMIN — OXYCODONE 5 MG: 5 TABLET ORAL at 10:02

## 2025-02-27 RX ADMIN — METHOCARBAMOL 500 MG: 500 TABLET ORAL at 08:02

## 2025-02-27 RX ADMIN — ACETAMINOPHEN 1000 MG: 500 TABLET ORAL at 12:02

## 2025-02-27 RX ADMIN — ATORVASTATIN CALCIUM 40 MG: 40 TABLET, FILM COATED ORAL at 08:02

## 2025-02-27 RX ADMIN — OXYCODONE 5 MG: 5 TABLET ORAL at 08:02

## 2025-02-27 RX ADMIN — MUPIROCIN 1 G: 20 OINTMENT TOPICAL at 08:02

## 2025-02-27 RX ADMIN — ACETAMINOPHEN 1000 MG: 500 TABLET ORAL at 11:02

## 2025-02-27 RX ADMIN — POTASSIUM CHLORIDE 10 MEQ: 750 CAPSULE, EXTENDED RELEASE ORAL at 08:02

## 2025-02-27 RX ADMIN — FUROSEMIDE 20 MG: 20 TABLET ORAL at 05:02

## 2025-02-27 RX ADMIN — AMLODIPINE BESYLATE 5 MG: 5 TABLET ORAL at 08:02

## 2025-02-27 RX ADMIN — FAMOTIDINE 20 MG: 20 TABLET ORAL at 08:02

## 2025-02-27 RX ADMIN — POLYETHYLENE GLYCOL 3350 17 G: 17 POWDER, FOR SOLUTION ORAL at 08:02

## 2025-02-27 RX ADMIN — FUROSEMIDE 20 MG: 20 TABLET ORAL at 08:02

## 2025-02-27 RX ADMIN — ACETAMINOPHEN 1000 MG: 500 TABLET ORAL at 05:02

## 2025-02-27 RX ADMIN — OXYCODONE 5 MG: 5 TABLET ORAL at 04:02

## 2025-02-27 RX ADMIN — DIBASIC SODIUM PHOSPHATE, MONOBASIC POTASSIUM PHOSPHATE AND MONOBASIC SODIUM PHOSPHATE 2 TABLET: 852; 155; 130 TABLET ORAL at 08:02

## 2025-02-27 RX ADMIN — OXYCODONE 5 MG: 5 TABLET ORAL at 05:02

## 2025-02-27 RX ADMIN — ENOXAPARIN SODIUM 40 MG: 40 INJECTION SUBCUTANEOUS at 05:02

## 2025-02-27 NOTE — NURSING TRANSFER
Nursing Transfer Note      2/26/2025   7:57 PM    Nurse giving handoff:Katherine ISNCLAIR  Nurse receiving handoff:FRANK Tatum    Reason patient is being transferred: Step down orders    Transfer To:     Transfer via wheelchair    Transfer with cardiac monitoring    Transported by RN x1    Transfer Vital Signs:  Blood Pressure:141/74  Heart Rate:85  O2:98  Temperature:98.5  Respirations:18    Telemetry: yes  Order for Tele Monitor? Yes    Additional Lines: Chest Tube    Medicines sent: yes    Patient belongings transferred with patient: Yes    Chart send with patient: Yes    Notified: Family with pt.     Patient reassessed at: 2/26/25 @ 1938   Upon arrival to floor: cardiac monitor applied, patient oriented to room, call bell in reach, and bed in lowest position

## 2025-02-27 NOTE — PLAN OF CARE
SICU PLAN OF CARE    Dx: Coronary artery disease involving native coronary artery of native heart    Goals of Care: SBP <140    Vital Signs (last 12 hours):   Temp:  [98.5 °F (36.9 °C)]   Pulse:  [70-90]   Resp:  [22-42]   BP: (107-133)/(56-67)   SpO2:  [95 %-99 %]      Neuro: AAOx4, Follows commands , and Moves all extremities spontaneously     Cardiac: normal sinus rhythm    Respiratory: Room Air    Gtts: n/a    Urine Output: Voids Spontaneously  300 mL    Drains: Chest Tube, total output 40 mL/shift    Diet: Cardiac      Labs/Accuchecks: ACHS    Skin: .  Patient turned q2h, bony prominences protected, and mattress inflated/working correctly.     Shift Events:  No acute changes this shift, pt remains AOX4, on RA.  See flowsheet for further assessment/details.  Family updated on current condition/plan of care, questions answered, and emotional support provided.  MD updated on current condition, vitals, labs, and gtts.

## 2025-02-27 NOTE — CARE UPDATE
-Glucose Goal 110-140    -A1C:   Hemoglobin A1C   Date Value Ref Range Status   02/20/2025 5.6 4.0 - 5.6 % Final     Comment:     ADA Screening Guidelines:  5.7-6.4%  Consistent with prediabetes  >or=6.5%  Consistent with diabetes    High levels of fetal hemoglobin interfere with the HbA1C  assay. Heterozygous hemoglobin variants (HbS, HgC, etc)do  not significantly interfere with this assay.   However, presence of multiple variants may affect accuracy.           -HOME REGIMEN: No hx of DM per chart review     -GLUCOSE TREND FOR THE PAST 24HRS:   Recent Labs   Lab 02/25/25 2053 02/26/25  0008 02/26/25  0417 02/26/25  0836 02/26/25  1204 02/26/25  1652   POCTGLUCOSE 120* 110 129* 112* 121* 110         -NO HYPOGYCEMIAS NOTED     - Diet  Diet Cardiac Fluid - 1500mL    -TOLERATING 50 % OF PO DIET       Plan:   Patient denies a history of DM. BG has been within goal without insulin requirements after discontinuing IIP post-CTS with diet advanced. Given this, endocrine will sign off at this time. Please do not hesitate to reach out for any BG related questions.

## 2025-02-27 NOTE — HPI
Ms. Yung is a 54-year-old woman who initially presented with chest pain.  This prompted a cardiac evaluation which included a positive stress test.  Coronary angiography demonstrated an ostial LAD lesion that was near totally occlusive.  She now presents for coronary artery bypass grafting.

## 2025-02-27 NOTE — ASSESSMENT & PLAN NOTE
ASA  Statin   BB  Lasix and K added  PT/OT   Chest tubes out  Pacer Wires secured   Bowel regimen   Pain regimen   PPI   Oxygen off  SubQ Lovenox   Norvasc

## 2025-02-27 NOTE — PLAN OF CARE
AAOX4,VSS,O2 sats > 95% on RA. Plan of care discussed with patient and family (mom at bedside) . Patient has no complaints of chest pain/SOB/palpitations.Sternal precautions in place, pt complains of pain in chest from sternal incision, PRN meds given per order. Chest tube in place with no complications.Pt ambulating  with assist x 2, fall precautions in place,no falls/injuries through the shift.Discussed medications and care.Patient has no questions at this time.Pt resting comfortably with no acute distress.Call light within reach,bed at lowest position.      Problem: Cardiovascular Surgery  Goal: Improved Activity Tolerance  Outcome: Progressing  Goal: Absence of Bleeding  Outcome: Progressing  Goal: Effective Bowel Elimination  Outcome: Progressing  Goal: Effective Cardiac Function  Outcome: Progressing  Goal: Blood Glucose Level Within Targeted Range  Outcome: Progressing  Goal: Acceptable Pain Control  Outcome: Progressing  Goal: Effective Urinary Elimination  Outcome: Progressing     Problem: Wound  Goal: Absence of Infection Signs and Symptoms  Outcome: Progressing  Goal: Improved Oral Intake  Outcome: Progressing     Problem: Infection  Goal: Absence of Infection Signs and Symptoms  Outcome: Progressing     Problem: Fall Injury Risk  Goal: Absence of Fall and Fall-Related Injury  Outcome: Progressing     Problem: Skin Injury Risk Increased  Goal: Skin Health and Integrity  Outcome: Progressing     Problem: Bariatric Environmental Safety  Goal: Safety Maintained with Care  Outcome: Progressing

## 2025-02-27 NOTE — SUBJECTIVE & OBJECTIVE
Interval History: NAEON. Last chest tube removed. Sites cleansed with betadine and re-dressed. Pacing wires secured. Ready to work with therapy.     Review of Systems   Constitutional: Negative for malaise/fatigue.   Cardiovascular:  Negative for chest pain.   Respiratory:  Negative for shortness of breath.    Genitourinary:  Negative for dysuria.   Neurological:  Negative for weakness.   Medications:  Continuous Infusions:  Scheduled Meds:   acetaminophen  1,000 mg Oral Q6H    amLODIPine  5 mg Oral Daily    aspirin  325 mg Oral Daily    atorvastatin  40 mg Oral QHS    docusate sodium  100 mg Oral BID    enoxaparin  40 mg Subcutaneous Daily    famotidine  20 mg Oral BID    furosemide  20 mg Oral BID    methocarbamoL  500 mg Oral TID    metoprolol tartrate  12.5 mg Oral BID    mupirocin  1 g Nasal BID    polyethylene glycol  17 g Oral Daily    potassium chloride  10 mEq Oral BID     PRN Meds:  Current Facility-Administered Medications:     albumin human 5%, 25 g, Intravenous, Once PRN    dextrose 50%, 12.5 g, Intravenous, PRN    dextrose 50%, 25 g, Intravenous, PRN    metoclopramide, 5 mg, Intravenous, Q6H PRN    ondansetron, 4 mg, Intravenous, Q12H PRN    oxyCODONE, 5 mg, Oral, Q3H PRN    sodium chloride 0.9%, 10 mL, Intravenous, PRN     Objective:     Vital Signs (Most Recent):  Temp: 98.7 °F (37.1 °C) (02/27/25 0740)  Pulse: 82 (02/27/25 0808)  Resp: 18 (02/27/25 0740)  BP: 130/65 (02/27/25 0740)  SpO2: (!) 93 % (02/27/25 0740) Vital Signs (24h Range):  Temp:  [98.1 °F (36.7 °C)-98.7 °F (37.1 °C)] 98.7 °F (37.1 °C)  Pulse:  [70-90] 82  Resp:  [18-42] 18  SpO2:  [92 %-99 %] 93 %  BP: (107-141)/(56-74) 130/65     Weight: 102.7 kg (226 lb 6.6 oz)  Body mass index is 40.11 kg/m².    SpO2: (!) 93 %       Intake/Output - Last 3 Shifts         02/25 0700 02/26 0659 02/26 0700 02/27 0659 02/27 0700 02/28 0659    P.O. 440 1114     I.V. (mL/kg) 257.6 (2.5)      Blood       IV Piggyback 350.4       "Total Intake(mL/kg) 1047.9 (10.1) 1114 (10.8)     Urine (mL/kg/hr) 1415 (0.6) 350 (0.1)     Chest Tube 115 40     Total Output 1530 390     Net -482.1 +724            Urine Occurrence  2 x             Lines/Drains/Airways       Drain  Duration                  Chest Tube 02/24/25 2044 Tube - 1 Left Pleural 19 Fr. 2 days              Line  Duration                  Pacer Wires 02/24/25 3 days              Peripheral Intravenous Line  Duration                  Peripheral IV - Single Lumen 02/24/25 1605 14 G  Left;Anterior Forearm 2 days         Peripheral IV - Single Lumen 02/26/25 1113 20 G Anterior;Right Forearm <1 day                     Physical Exam  Vitals reviewed.   Constitutional:       General: She is not in acute distress.     Appearance: She is well-developed. She is not diaphoretic.   HENT:      Head: Normocephalic and atraumatic.   Eyes:      Conjunctiva/sclera: Conjunctivae normal.   Neck:      Vascular: No JVD.   Cardiovascular:      Rate and Rhythm: Normal rate and regular rhythm.      Comments: Midline sternal incision c/d/i  Pulmonary:      Effort: Pulmonary effort is normal. No respiratory distress.   Abdominal:      General: There is no distension.   Musculoskeletal:         General: No swelling. Normal range of motion.      Cervical back: Normal range of motion.   Skin:     Coloration: Skin is not pale.   Neurological:      General: No focal deficit present.      Mental Status: She is alert.   Psychiatric:         Speech: Speech normal.         Behavior: Behavior normal.         Thought Content: Thought content normal.         Judgment: Judgment normal.        Significant Labs:  BMP:   Recent Labs   Lab 02/27/25  0446   GLU 98      K 3.9      CO2 24   BUN 12   CREATININE 0.6   CALCIUM 8.5*   MG 2.2     Cardiac markers: No results for input(s): "CKMB", "CPKMB", "TROPONINT", "TROPONINI", "MYOGLOBIN" in the last 48 hours.  CBC:   Recent Labs   Lab 02/27/25  0446   WBC 16.21*   RBC 2.88* "   HGB 8.7*   HCT 26.7*      MCV 93   MCH 30.2   MCHC 32.6     CMP:   Recent Labs   Lab 02/27/25  0446   GLU 98   CALCIUM 8.5*   ALBUMIN 2.8*   PROT 6.3      K 3.9   CO2 24      BUN 12   CREATININE 0.6   ALKPHOS 85   ALT 13   AST 27   BILITOT 0.5       Significant Diagnostics:  I have reviewed all pertinent imaging results/findings within the past 24 hours.

## 2025-02-27 NOTE — HOSPITAL COURSE
On 2/24/25, the patient was taken to the Operating Room for the above stated procedure. Please see the previously dictated operative report for complete details. Postoperatively, the patient was taken from the  Operating Room to the ICU where the vital signs were monitored and pain was kept under control. The patient was weaned from the drips and extubated in the ICU per protocol. Once hemodynamically stable, the patient was transferred to the Cardiac Step-Down floor for continued strengthening and ambulation. On postoperative day 4, the patient was ready for discharge to home. At the time of discharge, the patient was ambulating unassisted. Pain was well controlled with oral analgesics and the patient was tolerating the diet.     MOBILITY AND ACTIVITY: As tolerated. Patient may shower. No heavy lifting of greater than 5 pounds and no driving.     DIET: An 1800-calorie ADA with a 1500 mL fluid restriction.     WOUND CARE INSTRUCTIONS: Check for redness, swelling and drainage around the  incision or wound. Patient is to call for any obvious bleeding, drainage, pus from the wound, unusual problems or difficulties or temperature of greater than 101   degrees.     FOLLOWUP: Follow up with Dr. Nugent in approximately 3 weeks. Prior to this  appointment, the patient will have a chest x-ray and EKG.     Patient not placed on [employer]-In[employer] at the time of discharge due to potential for hypotension       DISCHARGE CONDITION: At the time of discharge, the patient was in sinus rhythm and afebrile with stable vital signs.

## 2025-02-27 NOTE — PT/OT/SLP PROGRESS
"Occupational Therapy   Treatment    Name: Pat Yung  MRN: [id number]  Admitting Diagnosis:  Coronary artery disease involving native coronary artery of native heart  3 Days Post-Op    Recommendations:     Discharge Recommendations: No Therapy Indicated  Discharge Equipment Recommendations:  shower chair  Barriers to discharge:  None    Assessment:     Pat Yung is a 54 y.o. female with a medical diagnosis of Coronary artery disease involving native coronary artery of native heart.  She presents with performance deficits affecting function are weakness, impaired endurance, impaired self care skills, impaired functional mobility, gait instability, impaired balance, impaired skin.     Pt encountered sitting upright in chair with mother present, pleasant demeanor and agreeable to therapy this date. Pt expressed need to use bathroom, assisted to commode with light assistance and assisted with toileting & dressing 2* pt's pain when reaching. Pt able to complete functional mobility with CGA to SBA in hallway, noting a slight pressure in L side chest - RN notified. Pt returned to upright chair and made comfortable with mother present. Pt on pathway to receive no post acute therapy.     Rehab Prognosis:  Good; patient would benefit from acute skilled OT services to address these deficits and reach maximum level of function.       Plan:     Patient to be seen 5 x/week to address the above listed problems via self-care/home management, therapeutic activities, therapeutic exercises, neuromuscular re-education  Plan of Care Expires: 03/25/25  Plan of Care Reviewed with: patient, mother    Subjective     Chief Complaint: "I really need to use the restroom"   Patient/Family Comments/goals: Get better, return home   Pain/Comfort:  Pain Rating 1:  (not rated)  Location - Side 1: Left  Location 1: chest ("a pressure")  Pain Addressed 1: Nurse notified, Cessation of Activity, Distraction  Pain Rating Post-Intervention 1: " 0/10    Objective:     Communicated with: RN prior to session.  Patient found up in chair with telemetry upon OT entry to room.    General Precautions: Standard, fall, sternal    Orthopedic Precautions:N/A  Braces: N/A  Respiratory Status: Room air     Occupational Performance:     Bed Mobility:    Not observed this date    Functional Mobility/Transfers:  Patient completed Sit <> Stand Transfer:   To/from upright chair x2 with contact guard assistance  with  hand-held assist   To/from bsc x2 with CGA/HHA  Patient completed Bed <> Chair Transfer using Step Transfer technique with contact guard assistance with hand-held assist  Functional Mobility: Pt engaging in functional mobility to simulate household/community distances in hospital room and hallway with CGA/HHA to SBA and utilizing no AD in order to maximize functional activity tolerance and standing balance required for engagement in occupations of choice. Pt walked to intersection of hallways by RN stations and turned around.   Pt able to stand ~4min with SBA/no AD during toileting and assist with anterior gustavo-care    Activities of Daily Living:  Grooming: independence wiping hands with moist towelette  Upper Body Dressing: contact guard assistance doffing soiled anterior gown, donning fresh anterior and posterior gowns with assistance for pulling around back and threading arms through sleeves  Toileting: contact guard assistance as pt completed anterior pericare x3, max A completing posterior gustavo-care      [facility]    Treatment & Education:  Pt educated on role of OT, POC, and goals for therapy.    POC was dicussed with patient/caregiver, who was included in its development and is in agreement with the identified goals and treatment plan.   Patient and family aware of patient's deficits and therapy progression.   Time provided for therapeutic counseling and discussion of health disposition.   Educated on importance of EOB/OOB mobility,  maintaining routine, sitting up in chair, and maximizing independence with ADLs during admission   Pt completed ADLs and functional mobility for treatment session as noted above   Pt/caregiver verbalized understanding and expressed no further concerns/questions.  Updated communication board with level of assist required       Patient left up in chair with all lines intact, call button in reach, and RN notified and mother present    GOALS:   Multidisciplinary Problems       Occupational Therapy Goals          Problem: Occupational Therapy    Goal Priority Disciplines Outcome Interventions   Occupational Therapy Goal     OT, PT/OT Progressing    Description: Goals to be met by: 3/25/25 (1 mo)     Patient will increase functional independence with ADLs by performing:    UE Dressing with [place].  LE Dressing with [place].  Grooming while standing at sink with [place].  Toileting from toilet with [place] for hygiene and clothing management.   Rolling to Bilateral with [place].   Supine to sit with [place].  Step transfer with [place]  Toilet transfer to toilet with [place].                         DME Justifications:  No DME recommended requiring DME justifications    Time Tracking:     OT Date of Treatment: 02/27/25  OT Start Time: 1534  OT Stop Time: 1557  OT Total Time (min): 23 min    Billable Minutes:Self Care/Home Management 8  Therapeutic Activity 15    OT/SARAH: OT          2/27/2025

## 2025-02-27 NOTE — NURSING
Patient admitted to CSU. Patient arrived to floor from SICU no evidence of distress; patient AAO x4 at this time. Patient placed on tele. Vital signs obtained,chest tube in place hooked up to suction. Patient voices no complaints at this time. Plan of care initiated with patient. Bed in lowest position, locked, SR up x2, call bell in reach.     Nurses Note -- 4 Eyes      2/26/2025   8:00 PM      Skin assessed during: Transfer      [x] No Altered Skin Integrity Present    [x]Prevention Measures Documented      [] Yes- Altered Skin Integrity Present or Discovered   [] LDA Added if Not in Epic (Describe Wound)   [] New Altered Skin Integrity was Present on Admit and Documented in LDA   [] Wound Image Taken    Wound Care Consulted? No    Attending Nurse:  Deepti Quijano RN     Second RN/Staff Member:  Yue RAO

## 2025-02-27 NOTE — PROGRESS NOTES
[facility] - Cardiology Stepdown  Cardiothoracic Surgery  Progress Note    Patient Name: Pat Yung  MRN: [id number]  Admission Date: 2/24/2025  Hospital Length of Stay: 3 days  Code Status: Full Code   Attending Physician: Josef Nugent MD   Referring Provider: Josef Nugent MD  Principal Problem:Coronary artery disease involving native coronary artery of native heart        Subjective:     Post-Op Info:  Procedure(s) (LRB):  CORONARY ARTERY BYPASS GRAFT x1 (CABG) (N/A)   3 Days Post-Op     Interval History: NAEON. Last chest tube removed. Sites cleansed with betadine and re-dressed. Pacing wires secured. Ready to work with therapy.     Review of Systems   Constitutional: Negative for malaise/fatigue.   Cardiovascular:  Negative for chest pain.   Respiratory:  Negative for shortness of breath.    Genitourinary:  Negative for dysuria.   Neurological:  Negative for weakness.   Medications:  Continuous Infusions:  Scheduled Meds:   acetaminophen  1,000 mg Oral Q6H    amLODIPine  5 mg Oral Daily    aspirin  325 mg Oral Daily    atorvastatin  40 mg Oral QHS    docusate sodium  100 mg Oral BID    enoxaparin  40 mg Subcutaneous Daily    famotidine  20 mg Oral BID    furosemide  20 mg Oral BID    methocarbamoL  500 mg Oral TID    metoprolol tartrate  12.5 mg Oral BID    mupirocin  1 g Nasal BID    polyethylene glycol  17 g Oral Daily    potassium chloride  10 mEq Oral BID     PRN Meds:  Current Facility-Administered Medications:     albumin human 5%, 25 g, Intravenous, Once PRN    dextrose 50%, 12.5 g, Intravenous, PRN    dextrose 50%, 25 g, Intravenous, PRN    metoclopramide, 5 mg, Intravenous, Q6H PRN    ondansetron, 4 mg, Intravenous, Q12H PRN    oxyCODONE, 5 mg, Oral, Q3H PRN    sodium chloride 0.9%, 10 mL, Intravenous, PRN     Objective:     Vital Signs (Most Recent):  Temp: 98.7 °F (37.1 °C) (02/27/25 0740)  Pulse: 82 (02/27/25 0808)  Resp: 18 (02/27/25 0740)  BP: 130/65 (02/27/25 0740)  SpO2: (!) 93  % (02/27/25 0740) Vital Signs (24h Range):  Temp:  [98.1 °F (36.7 °C)-98.7 °F (37.1 °C)] 98.7 °F (37.1 °C)  Pulse:  [70-90] 82  Resp:  [18-42] 18  SpO2:  [92 %-99 %] 93 %  BP: (107-141)/(56-74) 130/65     Weight: 102.7 kg (226 lb 6.6 oz)  Body mass index is 40.11 kg/m².    SpO2: (!) 93 %       Intake/Output - Last 3 Shifts         02/25 0700  02/26 0659 02/26 0700  02/27 0659 02/27 0700 02/28 0659    P.O. 440 1114     I.V. (mL/kg) 257.6 (2.5)      Blood       IV Piggyback 350.4      Total Intake(mL/kg) 1047.9 (10.1) 1114 (10.8)     Urine (mL/kg/hr) 1415 (0.6) 350 (0.1)     Chest Tube 115 40     Total Output 1530 390     Net -482.1 +724            Urine Occurrence  2 x             Lines/Drains/Airways       Drain  Duration                  Chest Tube 02/24/25 2044 Tube - 1 Left Pleural 19 Fr. 2 days              Line  Duration                  Pacer Wires 02/24/25 3 days              Peripheral Intravenous Line  Duration                  Peripheral IV - Single Lumen 02/24/25 1605 14 G  Left;Anterior Forearm 2 days         Peripheral IV - Single Lumen 02/26/25 1113 20 G Anterior;Right Forearm <1 day                     Physical Exam  Vitals reviewed.   Constitutional:       General: She is not in acute distress.     Appearance: She is well-developed. She is not diaphoretic.   HENT:      Head: Normocephalic and atraumatic.   Eyes:      Conjunctiva/sclera: Conjunctivae normal.   Neck:      Vascular: No JVD.   Cardiovascular:      Rate and Rhythm: Normal rate and regular rhythm.      Comments: Midline sternal incision c/d/i  Pulmonary:      Effort: Pulmonary effort is normal. No respiratory distress.   Abdominal:      General: There is no distension.   Musculoskeletal:         General: No swelling. Normal range of motion.      Cervical back: Normal range of motion.   Skin:     Coloration: Skin is not pale.   Neurological:      General: No focal deficit present.      Mental Status: She is alert.   Psychiatric:          "Speech: Speech normal.         Behavior: Behavior normal.         Thought Content: Thought content normal.         Judgment: Judgment normal.        Significant Labs:  BMP:   Recent Labs   Lab 02/27/25  0446   GLU 98      K 3.9      CO2 24   BUN 12   CREATININE 0.6   CALCIUM 8.5*   MG 2.2     Cardiac markers: No results for input(s): "CKMB", "CPKMB", "TROPONINT", "TROPONINI", "MYOGLOBIN" in the last 48 hours.  CBC:   Recent Labs   Lab 02/27/25  0446   WBC 16.21*   RBC 2.88*   HGB 8.7*   HCT 26.7*      MCV 93   MCH 30.2   MCHC 32.6     CMP:   Recent Labs   Lab 02/27/25  0446   GLU 98   CALCIUM 8.5*   ALBUMIN 2.8*   PROT 6.3      K 3.9   CO2 24      BUN 12   CREATININE 0.6   ALKPHOS 85   ALT 13   AST 27   BILITOT 0.5       Significant Diagnostics:  I have reviewed all pertinent imaging results/findings within the past 24 hours.   Assessment/Plan:     * Coronary artery disease involving native coronary artery of native heart  S/p CABG     Hypophosphatemia  Replaced PO   Daily lab     Acute blood loss anemia  Expected post operatively   CBC daily     S/P CABG (coronary artery bypass graft)  ASA  Statin   BB  Lasix and K added  PT/OT   Chest tubes out  Pacer Wires secured   Bowel regimen   Pain regimen   PPI   Oxygen off  SubQ Lovenox   Norvasc         Transient hyperglycemia post procedure  Endocrine following     GERD (gastroesophageal reflux disease)  Famotidine     Hypertension  Norvasc       Dispo: CSU. Possible DC tomorrow pending medical stability with ambulation     Tequila Mckeon PA-C  Cardiothoracic Surgery  [facility] - [facility]iology Step[facility]  "

## 2025-02-27 NOTE — OP NOTE
DATE OF PROCEDURE:  2/24/2025     ATTENDING SURGEON:  Josef Nugent M.D.     ASSISTANT:  Suyapa Travis M.D. (Cardiothoracic resident)     PREOPERATIVE DIAGNOSES:  1.  Coronary artery disease.  2.  Hypertension  3.  Hyperlipidemia    POSTOPERATIVE DIAGNOSES:  1.  Coronary artery disease.   2.  Hypertension  3.  Hyperlipidemia    OPERATIONS PERFORMED:     Coronary artery bypass grafting x 1 with left internal mammary artery to left   anterior descending     ANESTHESIA:  General endotracheal.     ESTIMATED BLOOD LOSS:  100 mL     BRIEF HISTORY: Ms. Yung is a 54-year-old woman who initially presented with chest pain.  This prompted a cardiac evaluation which included a positive stress test.  Coronary angiography demonstrated an ostial LAD lesion that was near totally occlusive.  She now presents for coronary artery bypass grafting.    PROCEDURE IN DETAIL:  After obtaining informed and written consent, the patient   was brought to the Operating Room and placed on the operating table in supine   position.  After induction of adequate general endotracheal anesthesia, the   patient's chest, abdomen, pelvis and bilateral lower extremities were prepped   and draped in the usual sterile fashion.  An upper midline skin incision was made, and a median sternotomy was performed. The left   internal mammary artery was completely dissected, but not divided.  A   pericardial well was created.  The patient was systemically heparinized.    Cannulation sutures were placed in the aorta and in the right atrial appendage.    The aortic cannula was inserted, followed by the venous.  Antegrade and   retrograde cardioplegia catheters were placed.  The mammary was divided distally   and its end was prepared.  The patient was then put on cardiopulmonary bypass.    Once on bypass, the aortic crossclamp was applied and the heart was arrested   using cold blood enhanced antegrade cardioplegia.  A prompt electromechanical   arrest was  achieved. We then turned our attention to the   anterior wall.  The LAD was identified and a site suitable for grafting   was located.  An arteriotomy was made, the mammary was brought up and the   anastomosis was performed using a running 7-0 Prolene suture.  After completion   of the anastomosis, it was tested.  It was hemostatic.  Another dose of   cardioplegia was given retrograde. The mammary   pedicle was tacked to the heart using two 5-0 Prolene sutures. The patient was subsequently weaned from cardiopulmonary bypass.    The patient did separate easily from bypass.  Once off bypass, all surgical   sites were inspected.  There was good hemostasis.  The test dose of protamine   was administered and this was well tolerated.  The total dose was then given.    [place] through the total dose, all cannulas were removed.  All surgical sites   were again inspected, again there was good hemostasis.  Ventricular pacing wires   were placed.  Drains were placed.  After again confirming adequate hemostasis,   the patient's chest was closed using #6 stainless steel wires to reapproximate   the sternum.  The overlying soft tissues were reapproximated using absorbable   suture material.  The patient's chest was washed and dried and a dry dressing   was applied.  The patient tolerated the procedure well, there were no   complications.  At the conclusion of the case, sponge and instrument counts were   correct.

## 2025-02-28 ENCOUNTER — DOCUMENTATION ONLY (OUTPATIENT)
Dept: CARDIOTHORACIC SURGERY | Facility: CLINIC | Age: 55
End: 2025-02-28
Payer: COMMERCIAL

## 2025-02-28 VITALS
RESPIRATION RATE: 18 BRPM | OXYGEN SATURATION: 99 % | SYSTOLIC BLOOD PRESSURE: 139 MMHG | DIASTOLIC BLOOD PRESSURE: 62 MMHG | HEIGHT: 63 IN | BODY MASS INDEX: 39.26 KG/M2 | HEART RATE: 72 BPM | WEIGHT: 221.56 LBS | TEMPERATURE: 98 F

## 2025-02-28 LAB
ANION GAP SERPL CALC-SCNC: 10 MMOL/L (ref 8–16)
BASOPHILS # BLD AUTO: 0.1 K/UL (ref 0–0.2)
BASOPHILS NFR BLD: 0.8 % (ref 0–1.9)
BLD PROD TYP BPU: NORMAL
BLOOD UNIT EXPIRATION DATE: NORMAL
BLOOD UNIT TYPE CODE: 6200
BLOOD UNIT TYPE: NORMAL
BUN SERPL-MCNC: 10 MG/DL (ref 6–20)
CALCIUM SERPL-MCNC: 8.5 MG/DL (ref 8.7–10.5)
CHLORIDE SERPL-SCNC: 108 MMOL/L (ref 95–110)
CO2 SERPL-SCNC: 21 MMOL/L (ref 23–29)
CODING SYSTEM: NORMAL
CREAT SERPL-MCNC: 0.6 MG/DL (ref 0.5–1.4)
CROSSMATCH INTERPRETATION: NORMAL
DIFFERENTIAL METHOD BLD: ABNORMAL
DISPENSE STATUS: NORMAL
EOSINOPHIL # BLD AUTO: 0.2 K/UL (ref 0–0.5)
EOSINOPHIL NFR BLD: 1.4 % (ref 0–8)
ERYTHROCYTE [DISTWIDTH] IN BLOOD BY AUTOMATED COUNT: 14.6 % (ref 11.5–14.5)
EST. GFR  (NO RACE VARIABLE): >60 ML/MIN/1.73 M^2
GLUCOSE SERPL-MCNC: 91 MG/DL (ref 70–110)
HCT VFR BLD AUTO: 26.5 % (ref 37–48.5)
HGB BLD-MCNC: 8.4 G/DL (ref 12–16)
IMM GRANULOCYTES # BLD AUTO: 0.05 K/UL (ref 0–0.04)
IMM GRANULOCYTES NFR BLD AUTO: 0.4 % (ref 0–0.5)
LYMPHOCYTES # BLD AUTO: 3.7 K/UL (ref 1–4.8)
LYMPHOCYTES NFR BLD: 29.4 % (ref 18–48)
MAGNESIUM SERPL-MCNC: 2 MG/DL (ref 1.6–2.6)
MCH RBC QN AUTO: 29.6 PG (ref 27–31)
MCHC RBC AUTO-ENTMCNC: 31.7 G/DL (ref 32–36)
MCV RBC AUTO: 93 FL (ref 82–98)
MONOCYTES # BLD AUTO: 1.2 K/UL (ref 0.3–1)
MONOCYTES NFR BLD: 9.2 % (ref 4–15)
NEUTROPHILS # BLD AUTO: 7.4 K/UL (ref 1.8–7.7)
NEUTROPHILS NFR BLD: 58.8 % (ref 38–73)
NRBC BLD-RTO: 1 /100 WBC
PHOSPHATE SERPL-MCNC: 2.4 MG/DL (ref 2.7–4.5)
PLATELET # BLD AUTO: 248 K/UL (ref 150–450)
PMV BLD AUTO: 9.8 FL (ref 9.2–12.9)
POCT GLUCOSE: 114 MG/DL (ref 70–110)
POTASSIUM SERPL-SCNC: 4.1 MMOL/L (ref 3.5–5.1)
RBC # BLD AUTO: 2.84 M/UL (ref 4–5.4)
SODIUM SERPL-SCNC: 139 MMOL/L (ref 136–145)
TRANS ERYTHROCYTES VOL PATIENT: NORMAL ML
WBC # BLD AUTO: 12.56 K/UL (ref 3.9–12.7)

## 2025-02-28 PROCEDURE — 25000003 PHARM REV CODE 250: Performed by: THORACIC SURGERY (CARDIOTHORACIC VASCULAR SURGERY)

## 2025-02-28 PROCEDURE — 83735 ASSAY OF MAGNESIUM: CPT | Performed by: PHYSICIAN ASSISTANT

## 2025-02-28 PROCEDURE — 25000003 PHARM REV CODE 250

## 2025-02-28 PROCEDURE — 84100 ASSAY OF PHOSPHORUS: CPT | Performed by: PHYSICIAN ASSISTANT

## 2025-02-28 PROCEDURE — 97116 GAIT TRAINING THERAPY: CPT | Mod: CQ

## 2025-02-28 PROCEDURE — 80048 BASIC METABOLIC PNL TOTAL CA: CPT | Performed by: PHYSICIAN ASSISTANT

## 2025-02-28 PROCEDURE — 25000003 PHARM REV CODE 250: Performed by: PHYSICIAN ASSISTANT

## 2025-02-28 PROCEDURE — 85025 COMPLETE CBC W/AUTO DIFF WBC: CPT | Performed by: PHYSICIAN ASSISTANT

## 2025-02-28 RX ORDER — METOPROLOL TARTRATE 25 MG/1
25 TABLET, FILM COATED ORAL 2 TIMES DAILY
Status: DISCONTINUED | OUTPATIENT
Start: 2025-02-28 | End: 2025-02-28 | Stop reason: HOSPADM

## 2025-02-28 RX ORDER — METOPROLOL TARTRATE 25 MG/1
25 TABLET, FILM COATED ORAL 2 TIMES DAILY
Qty: 60 TABLET | Refills: 11 | Status: SHIPPED | OUTPATIENT
Start: 2025-02-28 | End: 2026-02-28

## 2025-02-28 RX ADMIN — DOCUSATE SODIUM 100 MG: 100 CAPSULE, LIQUID FILLED ORAL at 08:02

## 2025-02-28 RX ADMIN — POLYETHYLENE GLYCOL 3350 17 G: 17 POWDER, FOR SOLUTION ORAL at 08:02

## 2025-02-28 RX ADMIN — METOPROLOL TARTRATE 25 MG: 25 TABLET, FILM COATED ORAL at 08:02

## 2025-02-28 RX ADMIN — MUPIROCIN 1 G: 20 OINTMENT TOPICAL at 08:02

## 2025-02-28 RX ADMIN — FAMOTIDINE 20 MG: 20 TABLET ORAL at 08:02

## 2025-02-28 RX ADMIN — METHOCARBAMOL 500 MG: 500 TABLET ORAL at 08:02

## 2025-02-28 RX ADMIN — DIBASIC SODIUM PHOSPHATE, MONOBASIC POTASSIUM PHOSPHATE AND MONOBASIC SODIUM PHOSPHATE 2 TABLET: 852; 155; 130 TABLET ORAL at 08:02

## 2025-02-28 RX ADMIN — POTASSIUM CHLORIDE 10 MEQ: 750 CAPSULE, EXTENDED RELEASE ORAL at 08:02

## 2025-02-28 RX ADMIN — AMLODIPINE BESYLATE 5 MG: 5 TABLET ORAL at 08:02

## 2025-02-28 RX ADMIN — ACETAMINOPHEN 1000 MG: 500 TABLET ORAL at 05:02

## 2025-02-28 RX ADMIN — ASPIRIN 325 MG ORAL TABLET 325 MG: 325 PILL ORAL at 08:02

## 2025-02-28 RX ADMIN — FUROSEMIDE 20 MG: 20 TABLET ORAL at 08:02

## 2025-02-28 NOTE — PROGRESS NOTES
Met with pt and her mother at bedside and reviewed wound care instructions for pt's midsternal and chest tube site incisions, s/p CABG.  Reviewed daily inspection and cleaning of surgical incisions and instructed pt to call the clinic with any questions or concerns.  Pt provided with a hand-out (see below) which contains detailed instructions on daily wound care inspection and care.  Pt reminded of her 5 pound lifting, pushing, and pulling restrictions for the first 6 weeks following his surgery and to refrain from driving until released by Dr. Nugent.  Pt informed of her post-op appts and was provided with a copy of her appts for that day.  Pt and her mother verbalized understanding of all instructions.          Showering   If your incisions are healing and there is no drainage - it is ok to take a shower.  Shower with your back to the shower spray.  It is ok for your incision to get wet, but the shower spray should not directly hit your chest.  Do not soak in a tub.  The water temperature should be warm -- not too hot or cold. Extreme water temperatures can cause you to feel faint.  It is expected that you wash your incisions when you shower, however only use soap and water to cleanse the sites.  -Use normal bar soap, not perfumed soap or body wash. During your recovery, do not try a new brand of soap.  -Place soapy water on your hand or a clean washcloth and gently wash your incisions using an up-and-down motion.  -Do not apply ointments, oils, or salves to your incisions.  -Pat the skin gently to dry.     1. Wash your hands before and after caring for or touching your incisions.  2. Look in the mirror daily. Inspect your incisions for redness, drainage and warmth. Your incisions should be healing; there should NOT be an increase in opening.  3. Gently wash your incisions everyday with soap and warm water using a clean washcloth, or your hand and light touch.  4. Gently pat dry with a clean towel.  5. You do not  need to cover your incisions unless they are draining.  If you are experiencing drainage, it is important to call your doctor.  Monday - Friday, from 8:00am - 5pm, call [phone].  Outside of these hours, please call [phone], which is a 24-hour nurse care advice line.     When to call your doctor:  -Increased drainage or oozing from the incision(s)  -Increased opening of the incision line(s) - there should not be gaps or pulling apart areas of the incision(s)  -Redness along the incision(s) - if the incisions were red or pink when you left the hospital, they should be improving and not becoming more red  -Warmth along the incision line(s)  -Increased body temperature / Fever - greater than 101 degrees Fahrenheit  -If you have diabetes and your blood sugar levels begin to vary

## 2025-02-28 NOTE — PLAN OF CARE
[facility] - Cardiology Stepdown  Discharge Final Note    Primary Care Provider: Jero Weinberg MD    Expected Discharge Date: 2/28/2025    Patient discharged home with family. Family provided transportation.     Patient's bedside nurse and pt notified of the above.    Discharge Plan A and Plan B have been determined by review of patient's clinical status, future medical and therapeutic needs, and coverage/benefits for post-acute care in coordination with multidisciplinary team members.    Final Discharge Note (most recent)       Final Note - 02/28/25 0957          Final Note    Assessment Type Final Discharge Note (P)      Anticipated Discharge Disposition Home or Self Care (P)      Hospital Resources/Appts/Education Provided Provided patient/caregiver with written discharge plan information (P)         Post-Acute Status    Coverage BLUE CROSS BLUE SHIELD - BCBS OF [facility] - (P)      Discharge Delays None known at this time (P)                      Important Message from Medicare                 Future Appointments   Date Time Provider Department Center   3/25/2025  9:30 AM [facility] [id number] LB LIMIT [facility]RAY [facility]   3/25/2025  9:45 AM EKG, APPT [facility] EKG [facility]   3/25/2025 10:00 AM Josef Nugent MD [facility] CARDVAS [facility]   7/8/2025 11:00 AM Jero Weinberg MD [facility] Samara Andrade, MSN  RN Case Management  [phone]

## 2025-02-28 NOTE — NURSING
Patient is AAOX4,VSS, NAD. Discharge instructions reviewed and explained. Patient and mother verbalized understanding. Tele monitor and PIVs removed. Patient is to be escorted via wheel chair to vehicle by a transporter.

## 2025-02-28 NOTE — PT/OT/SLP PROGRESS
Physical Therapy Treatment    Patient Name:  Pat Yung   MRN:  [id number]    Recommendations:     Discharge Recommendations: No Therapy Indicated  Discharge Equipment Recommendations: shower chair  Barriers to discharge: None    Assessment:     Pat Yung is a 54 y.o. female admitted with a medical diagnosis of Coronary artery disease involving native coronary artery of native heart.  She presents with the following impairments/functional limitations: weakness, impaired endurance, impaired coordination, impaired functional mobility, gait instability. Pt walked 66ft and felt fatigue, vitals taken during seated rest break. HR: 85 and Pulse ox: 93.     Rehab Prognosis: Good; patient would benefit from acute skilled PT services to address these deficits and reach maximum level of function.    Recent Surgery: Procedure(s) (LRB):  CORONARY ARTERY BYPASS GRAFT x1 (CABG) (N/A) 4 Days Post-Op    Plan:     During this hospitalization, patient to be seen 5 x/week to address the identified rehab impairments via gait training, therapeutic activities, therapeutic exercises, neuromuscular re-education and progress toward the following goals:    Plan of Care Expires:  03/24/25    Subjective     Chief Complaint: N/A   Patient/Family Comments/goals: N/A  Pain/Comfort:  Pain Rating 1: 0/10  Location - Side 1: Left  Location 1: chest      Objective:     Communicated with RN prior to session.  Patient found up in chair with telemetry upon PT entry to room.     General Precautions: Standard, sternal, fall  Orthopedic Precautions: N/A  Braces: N/A  Respiratory Status: Room air     Functional Mobility:  Transfers:     Sit to Stand:  stand by assistance and contact guard assistance with no AD  Gait: Pt ambulated 66ft with a seated rest break for 1 minute with SBA and no AD. Pt then ambulated another 266ft  SBA no AD.       AM-PAC 6 CLICK MOBILITY  Turning over in bed (including adjusting bedclothes, sheets and blankets)?:  3  Sitting down on and standing up from a chair with arms (e.g., wheelchair, bedside commode, etc.): 4  Moving from lying on back to sitting on the side of the bed?: 3  Moving to and from a bed to a chair (including a wheelchair)?: 4  Need to walk in hospital room?: 3  Climbing 3-5 steps with a railing?: 2  Basic Mobility Total Score: 19       Treatment & Education:  Pt. Able to recall 2 of 3 sternal precautions.  Pt. Educated on the importance of sternal precautions.   Pt educated on the importance of continuing walks upon dicharge    Patient left up in chair with all lines intact and call button in reach..    GOALS:   Multidisciplinary Problems       Physical Therapy Goals          Problem: Physical Therapy    Goal Priority Disciplines Outcome Interventions   Physical Therapy Goal     PT, PT/OT Progressing    Description: Goals to be met by: 3/15/2025    Patient will increase functional independence with mobility by performin. Supine to sit with Supervision  2. Sit to stand transfer with Supervision -met   3. Gait  x 400 feet with Supervision.                          DME Justifications:  No DME recommended requiring DME justifications    Time Tracking:     PT Received On: 25  PT Start Time: 938     PT Stop Time: 955  PT Total Time (min): 17 min     Billable Minutes: Gait Training 17    Treatment Type: Treatment  PT/PTA: PTA     Number of PTA visits since last PT visit: 2025

## 2025-02-28 NOTE — DISCHARGE SUMMARY
[facility] - Cardiology Stepdown  Cardiothoracic Surgery  Discharge Summary      Patient Name: Pat Yung  MRN: [id number]  Admission Date: 2/24/2025  Hospital Length of Stay: 4 days  Discharge Date and Time:  02/28/2025 9:19 AM  Attending Physician: Josef Nugent MD   Discharging Provider: Tequila Mckeon PA-C  Primary Care Provider: Jero Weinberg MD    HPI:   Ms. Yung is a 54-year-old woman who initially presented with chest pain.  This prompted a cardiac evaluation which included a positive stress test.  Coronary angiography demonstrated an ostial LAD lesion that was near totally occlusive.  She now presents for coronary artery bypass grafting.     Procedure(s) (LRB):  CORONARY ARTERY BYPASS GRAFT x1 (CABG) (N/A)          Hospital Course: On 2/24/25, the patient was taken to the Operating Room for the above stated procedure. Please see the previously dictated operative report for complete details. Postoperatively, the patient was taken from the  Operating Room to the ICU where the vital signs were monitored and pain was kept under control. The patient was weaned from the drips and extubated in the ICU per protocol. Once hemodynamically stable, the patient was transferred to the Cardiac Step-Down floor for continued strengthening and ambulation. On postoperative day 4, the patient was ready for discharge to home. At the time of discharge, the patient was ambulating unassisted. Pain was well controlled with oral analgesics and the patient was tolerating the diet.     MOBILITY AND ACTIVITY: As tolerated. Patient may shower. No heavy lifting of greater than 5 pounds and no driving.     DIET: An 1800-calorie ADA with a 1500 mL fluid restriction.     WOUND CARE INSTRUCTIONS: Check for redness, swelling and drainage around the  incision or wound. Patient is to call for any obvious bleeding, drainage, pus from the wound, unusual problems or difficulties or temperature of greater than 101    degrees.     FOLLOWUP: Follow up with Dr. Nugent in approximately 3 weeks. Prior to this  appointment, the patient will have a chest x-ray and EKG.     Patient not placed on Ace-Inhibitor at the time of discharge due to potential for hypotension       DISCHARGE CONDITION: At the time of discharge, the patient was in sinus rhythm and afebrile with stable vital signs.      Goals of Care Treatment Preferences:  Code Status: Full Code      Consults (From admission, onward)          Status Ordering Provider     Consult to Endocrinology  Once        Provider:  (Not yet assigned)    Completed DREAD ROSA     Consult Case Management/Social Work  Once        Provider:  (Not yet assigned)    Acknowledged DREAD ROSA              No new Assessment & Plan notes have been filed under this hospital service since the last note was generated.  Service: Cardiothoracic Surgery    Final Active Diagnoses:    Diagnosis Date Noted POA    PRINCIPAL PROBLEM:  Coronary artery disease involving native coronary artery of native heart [I25.10] 02/11/2025 Yes    Acute blood loss anemia [D62] 02/27/2025 No    Hypophosphatemia [E83.39] 02/27/2025 No    Transient hyperglycemia post procedure [R73.9] 02/25/2025 No    S/P CABG (coronary artery bypass graft) [Z95.1] 02/25/2025 Not Applicable    GERD (gastroesophageal reflux disease) [K21.9] 12/11/2012 Yes    Hypertension [I10]  Yes      Problems Resolved During this Admission:      Discharged Condition: stable    Disposition: Home or Self Care    Follow Up:    Patient Instructions:   No discharge procedures on file.  Medications:  Reconciled Home Medications:      Medication List        START taking these medications      acetaminophen 500 MG tablet  Commonly known as: TYLENOL  Take 2 tablets (1,000 mg total) by mouth every 6 (six) hours as needed for Pain.     docusate sodium 100 MG capsule  Commonly known as: COLACE  Take 1 capsule (100 mg total) by mouth 2 (two) times daily  as needed for Constipation.     furosemide 20 MG tablet  Commonly known as: LASIX  Take 1 tablet (20 mg total) by mouth 2 (two) times a day for 7 days, THEN 1 tablet (20 mg total) once daily.  Start taking on: February 27, 2025     methocarbamoL 500 MG Tab  Commonly known as: Robaxin  Take 1 tablet (500 mg total) by mouth 3 (three) times daily. for 10 days     metoprolol tartrate 25 MG tablet  Commonly known as: LOPRESSOR  Take 1 tablet (25 mg total) by mouth 2 (two) times daily.     oxyCODONE 5 MG immediate release tablet  Commonly known as: ROXICODONE  Take 1 tablet (5 mg total) by mouth every 4 (four) hours as needed for Pain.     potassium chloride SA 20 MEQ tablet  Commonly known as: K-DUR,KLOR-CON  Take 1 tablet (20 mEq total) by mouth 2 (two) times a day for 7 days, THEN 1 tablet (20 mEq total) once daily.  Start taking on: February 27, 2025            CONTINUE taking these medications      amLODIPine 5 MG tablet  Commonly known as: NORVASC  Take 1 tablet (5 mg total) by mouth once daily.     aspirin 81 MG EC tablet  Commonly known as: ECOTRIN  Take 1 tablet (81 mg total) by mouth once daily.     atorvastatin 40 MG tablet  Commonly known as: LIPITOR  Take 1 tablet (40 mg total) by mouth every evening.     omeprazole 40 MG capsule  Commonly known as: PRILOSEC  Take 1 capsule (40 mg total) by mouth once daily.     valACYclovir 1000 MG tablet  Commonly known as: VALTREX  Take 1 tablet (1,000 mg total) by mouth every 12 (twelve) hours. Begin taking at first sign of outbreak            STOP taking these medications      ibuprofen 600 MG tablet  Commonly known as: ADVIL,MOTRIN     valsartan 80 MG tablet  Commonly known as: DIOVAN            Time spent on the discharge of patient: 30 minutes    Tequila Mckeon PA-C  Cardiothoracic Surgery  [facility] - Cardiology Step[facility]

## 2025-02-28 NOTE — PLAN OF CARE
Problem: Physical Therapy  Goal: Physical Therapy Goal  Description: Goals to be met by: 3/15/2025    Patient will increase functional independence with mobility by performin. Supine to sit with Supervision  2. Sit to stand transfer with Supervision -met   3. Gait  x 400 feet with Supervision.     Outcome: Progressing   Tx completed. Goal #2 met.

## 2025-03-03 ENCOUNTER — TELEPHONE (OUTPATIENT)
Dept: CARDIOTHORACIC SURGERY | Facility: CLINIC | Age: 55
End: 2025-03-03
Payer: COMMERCIAL

## 2025-03-03 NOTE — TELEPHONE ENCOUNTER
Called pt following hospital discharge.  Reiterated the need for pt to clean her incision everyday with soap and water, and to walk as much as she can.  Reminded pt not to drive for the first 4 weeks after surgery, and to refrain from lifting, pushing, and pulling anything greater than 5 pounds for the first 6 weeks following her surgery.  Instructed pt to perform daily weights.  Pt instructed to notify the clinic if she has a weight gain greater than 3 pounds in one day. Pt denies any questions regarding medications or home care instructions. Pt reports having gas pains and asking for medication to help with this - advised pt to try over the counter simethicone or Gas-X. Pt instructed to call the clinic with any questions or concerns.  Pt verbalized understanding.

## 2025-03-24 ENCOUNTER — TELEPHONE (OUTPATIENT)
Dept: CARDIOTHORACIC SURGERY | Facility: CLINIC | Age: 55
End: 2025-03-24
Payer: COMMERCIAL

## 2025-03-24 NOTE — TELEPHONE ENCOUNTER
Called pt to confirm 3/25 post-op appt with Dr. Nugent; no answer. Unable to leave vm as vm box is full. Appts confirmed through [facility].

## 2025-03-25 ENCOUNTER — HOSPITAL ENCOUNTER (OUTPATIENT)
Dept: RADIOLOGY | Facility: HOSPITAL | Age: 55
Discharge: HOME | End: 2025-03-25
Attending: THORACIC SURGERY (CARDIOTHORACIC VASCULAR SURGERY)
Payer: COMMERCIAL

## 2025-03-25 ENCOUNTER — HOSPITAL ENCOUNTER (OUTPATIENT)
Dept: CARDIOLOGY | Facility: CLINIC | Age: 55
Discharge: HOME | End: 2025-03-25
Attending: THORACIC SURGERY (CARDIOTHORACIC VASCULAR SURGERY)
Payer: COMMERCIAL

## 2025-03-25 ENCOUNTER — OFFICE VISIT (OUTPATIENT)
Dept: CARDIOTHORACIC SURGERY | Facility: CLINIC | Age: 55
End: 2025-03-25
Payer: COMMERCIAL

## 2025-03-25 VITALS
SYSTOLIC BLOOD PRESSURE: 123 MMHG | DIASTOLIC BLOOD PRESSURE: 63 MMHG | WEIGHT: 209.31 LBS | OXYGEN SATURATION: 100 % | HEIGHT: 63 IN | HEART RATE: 52 BPM | BODY MASS INDEX: 37.09 KG/M2

## 2025-03-25 DIAGNOSIS — Z95.1 S/P CABG (CORONARY ARTERY BYPASS GRAFT): ICD-10-CM

## 2025-03-25 DIAGNOSIS — Z95.1 S/P CABG (CORONARY ARTERY BYPASS GRAFT): Primary | ICD-10-CM

## 2025-03-25 LAB
OHS QRS DURATION: 84 MS
OHS QTC CALCULATION: 434 MS

## 2025-03-25 PROCEDURE — 4010F ACE/ARB THERAPY RXD/TAKEN: CPT | Mod: CPTII,S$GLB,, | Performed by: THORACIC SURGERY (CARDIOTHORACIC VASCULAR SURGERY)

## 2025-03-25 PROCEDURE — 99024 POSTOP FOLLOW-UP VISIT: CPT | Mod: S$GLB,,, | Performed by: THORACIC SURGERY (CARDIOTHORACIC VASCULAR SURGERY)

## 2025-03-25 PROCEDURE — 3078F DIAST BP <80 MM HG: CPT | Mod: CPTII,S$GLB,, | Performed by: THORACIC SURGERY (CARDIOTHORACIC VASCULAR SURGERY)

## 2025-03-25 PROCEDURE — 3044F HG A1C LEVEL LT 7.0%: CPT | Mod: CPTII,S$GLB,, | Performed by: THORACIC SURGERY (CARDIOTHORACIC VASCULAR SURGERY)

## 2025-03-25 PROCEDURE — 71046 X-RAY EXAM CHEST 2 VIEWS: CPT | Mod: 26,,, | Performed by: RADIOLOGY

## 2025-03-25 PROCEDURE — 1159F MED LIST DOCD IN RCRD: CPT | Mod: CPTII,S$GLB,, | Performed by: THORACIC SURGERY (CARDIOTHORACIC VASCULAR SURGERY)

## 2025-03-25 PROCEDURE — 99999 PR PBB SHADOW E&M-EST. PATIENT-LVL IV: CPT | Mod: PBBFAC,,, | Performed by: THORACIC SURGERY (CARDIOTHORACIC VASCULAR SURGERY)

## 2025-03-25 PROCEDURE — 93005 ELECTROCARDIOGRAM TRACING: CPT | Mod: S$GLB,,, | Performed by: THORACIC SURGERY (CARDIOTHORACIC VASCULAR SURGERY)

## 2025-03-25 PROCEDURE — 3074F SYST BP LT 130 MM HG: CPT | Mod: CPTII,S$GLB,, | Performed by: THORACIC SURGERY (CARDIOTHORACIC VASCULAR SURGERY)

## 2025-03-25 PROCEDURE — 93010 ELECTROCARDIOGRAM REPORT: CPT | Mod: S$GLB,,, | Performed by: INTERNAL MEDICINE

## 2025-03-25 PROCEDURE — 71046 X-RAY EXAM CHEST 2 VIEWS: CPT | Mod: TC,FY

## 2025-03-25 RX ORDER — OXYCODONE HYDROCHLORIDE 5 MG/1
5 CAPSULE ORAL EVERY 4 HOURS PRN
COMMUNITY

## 2025-03-25 NOTE — PATIENT INSTRUCTIONS
Please make appointments to check in with your PCP and Cardiologist in the next 2 to 6 weeks.    Continue walking during cooler parts of the day or early evening to build up your endurance.     You may drive, if you have power steering.    Your lifting restriction is still in place until you are 12 weeks out from your surgery:     ** 5 pounds first 6 weeks after surgery **   **20 pounds for weeks 7 - 12 after surgery **    COVID Precautions:    Continue to take precautions against COVID. Mask up when around unknown people, wash / sanitize hands frequently. Avoid large groups of people until at least 12 weeks post op.    Cardiac Rehab:    You are eligible for cardiac rehab, a 12-week exercise program designed for heart surgery patients. Please let us know if you are interested and we will send over orders for your referral. Below is a list of local facilities offering this program:    Facility Address Phone   [facility] [address] [place], [place] [address]vd., [place] [phone]   [facility] [address] [place] [address] [phone]   [facility] [id number] [place] [address] [address], [place] [address] [phone]   [facility] [address] [place] [address] [phone]   [facility] [address] [place] [phone]   [facility] [address] [place] [phone]   [address] [place] [phone]   [address] [phone]   [address] [place] [phone]

## 2025-03-25 NOTE — PROGRESS NOTES
Patient seen and examined. Patient is progressively increasing activity. No significant complaints.     Sternum: stable, incision CDI  Chest xray: Acceptable post op chest  EKG: NSR     Assessment: 2/24/2025  Coronary artery bypass grafting x 1 with left internal mammary artery to left   anterior descending     Plan:  Can begin driving   Can begin cardiac rehab 6 weeks after operation   We will refer to cardiology to assume care   DC lasix, potassium        No scheduled appointment, RTC prn  CTS Attending Note:    I have personally taken the history and examined this patient and agree with the JOSHUA's note as stated above.

## 2025-03-26 ENCOUNTER — TELEPHONE (OUTPATIENT)
Dept: CARDIAC REHAB | Facility: CLINIC | Age: 55
End: 2025-03-26
Payer: COMMERCIAL

## 2025-03-26 NOTE — LETTER
March 26, 2025    Pat Yung  [address] [place] [address]             [facility]  [address] [facility]  [place] [address]  Phone: [phone]                                  [facility]   [address] [facility]   [place] [address]  [phone]         [facility]   [address] [place] [address] [phone]         [facility]    [address] [facility] [address]  [place] [address] [phone]   Re: Pat Yung  Clinic number: [id number]    Dear Ms. Yung:    You were recently admitted to an [facility] facility for cardiac (heart) problem.  Your physician has referred you to [facility].  Cardiac Rehab Phase 2 is an educational and exercise program, conducted in a outpatient setting, proven to help reduce your risk for recurrent heart events.    Cardiac rehab has two major parts:    1. Exercise training to help you achieve cardiovascular fitness while learning how to exercise safely and improve muscle strength and endurance.  Your exercise prescription will be based on the results of the cardiopulmonary stress test (CPX) which will be done before entering the program and at completion.  2. Education, counseling and training to help you understand your heart condition and find ways to reduce your risk of future heart problems.  The cardiac rehab team will help you learn how to cope with the stress of adjusting to a new lifestyle and to deal with your fears about the future.    Phase 2 is a 36-session program, meeting 3 times a week for 12 weeks.  Each session consists of an hour of exercise and half-hour dedicated to the educational topic of the day.  Class days vary per location.  Please contact your nearest facility for details.    Through cardiac rehab you will learn:  About your heart condition, medical therapies, and medication  Risk factors in y our lifestyle contributing to heart  disease  New strategies to modify your risk factors  About a healthy diet that can lower your blood cholesterol, control weight, help prevent or control high blood pressure, and diabetes  How to stop smoking  How to manage stress    If you are interested in getting started, call the [facility] of your choosing.     Sincerely,     [facility]

## 2025-03-26 NOTE — TELEPHONE ENCOUNTER
Letter regarding Phase II cardiac rehab was sent to patient.  Will contact patient in 2 weeks to see if interested.  Also, information letter sent to [facility].  Victoria Alexandra RN  Cardiac Rehab Nurse

## 2025-03-31 DIAGNOSIS — R07.9 CHEST PAIN, UNSPECIFIED TYPE: ICD-10-CM

## 2025-03-31 RX ORDER — OMEPRAZOLE 40 MG/1
40 CAPSULE, DELAYED RELEASE ORAL DAILY
Qty: 90 CAPSULE | Refills: 3 | Status: SHIPPED | OUTPATIENT
Start: 2025-03-31 | End: 2026-03-31

## 2025-03-31 NOTE — TELEPHONE ENCOUNTER
No care due was identified.  [facility] Embedded Care Due Messages. Reference number: [id number].   3/31/2025 10:17:41 AM CDT

## 2025-04-01 NOTE — TELEPHONE ENCOUNTER
Refill Decision Note   Pat Yung  is requesting a refill authorization.  Brief Assessment and Rationale for Refill:  Approve     Medication Therapy Plan:  Not true ED visit      Extended chart review required: Yes   Comments:     Note composed:8:50 PM 03/31/2025

## 2025-04-02 ENCOUNTER — TELEPHONE (OUTPATIENT)
Dept: CARDIAC REHAB | Facility: CLINIC | Age: 55
End: 2025-04-02
Payer: COMMERCIAL

## 2025-04-02 NOTE — TELEPHONE ENCOUNTER
----- Message from Pratima sent at 4/2/2025 11:37 AM CDT -----  Regarding: Referral sessions  Pt [phone] calling in ref to her referralThanks

## 2025-04-10 DIAGNOSIS — I25.10 CORONARY ARTERY DISEASE, UNSPECIFIED VESSEL OR LESION TYPE, UNSPECIFIED WHETHER ANGINA PRESENT, UNSPECIFIED WHETHER NATIVE OR TRANSPLANTED HEART: ICD-10-CM

## 2025-04-10 DIAGNOSIS — Z95.1 POSTSURGICAL AORTOCORONARY BYPASS STATUS: Primary | ICD-10-CM

## 2025-04-16 ENCOUNTER — HOSPITAL ENCOUNTER (OUTPATIENT)
Dept: CARDIOLOGY | Facility: HOSPITAL | Age: 55
Discharge: HOME | End: 2025-04-16
Attending: THORACIC SURGERY (CARDIOTHORACIC VASCULAR SURGERY)
Payer: COMMERCIAL

## 2025-04-16 VITALS
HEART RATE: 49 BPM | WEIGHT: 209 LBS | DIASTOLIC BLOOD PRESSURE: 78 MMHG | HEIGHT: 63 IN | SYSTOLIC BLOOD PRESSURE: 134 MMHG | BODY MASS INDEX: 37.03 KG/M2

## 2025-04-16 DIAGNOSIS — I25.10 CORONARY ARTERY DISEASE, UNSPECIFIED VESSEL OR LESION TYPE, UNSPECIFIED WHETHER ANGINA PRESENT, UNSPECIFIED WHETHER NATIVE OR TRANSPLANTED HEART: ICD-10-CM

## 2025-04-16 DIAGNOSIS — Z95.1 POSTSURGICAL AORTOCORONARY BYPASS STATUS: ICD-10-CM

## 2025-04-16 LAB
CV STRESS BASE HR: 49 BPM
DIASTOLIC BLOOD PRESSURE: 78 MMHG
OHS CV CPX 1 MINUTE RECOVERY HEART RATE: 92 BPM
OHS CV CPX 85 PERCENT MAX PREDICTED HEART RATE MALE: 141
OHS CV CPX ABDOMINAL GIRTH: 45.5 CM
OHS CV CPX DATA GRADE - PEAK: 8
OHS CV CPX DATA O2 SAT - PEAK: 98
OHS CV CPX DATA O2 SAT - REST: 99
OHS CV CPX DATA SPEED - PEAK: 2.6
OHS CV CPX DATA TIME - PEAK: 3.83
OHS CV CPX DATA VE/VCO2 - PEAK: 31
OHS CV CPX DATA VE/VO2 - PEAK: 28
OHS CV CPX DATA VO2 - PEAK: 10.5
OHS CV CPX DATA VO2 - REST: 2.9
OHS CV CPX ESTIMATED METS: 6
OHS CV CPX FEV1/FVC: 0.68
OHS CV CPX FORCED EXPIRATORY VOLUME: 1.44
OHS CV CPX FORCED VITAL CAPACITY (FVC): 2.11
OHS CV CPX HIGHEST VO: 30.2
OHS CV CPX MAX PREDICTED HEART RATE: 166
OHS CV CPX MAXIMAL VOLUNTARY VENTILATION (MVV) PREDICTED: 57.6
OHS CV CPX MAXIMAL VOLUNTARY VENTILATION (MVV): 65
OHS CV CPX MAXIUMUM EXERCISE VENTILATION (VE MAX): 33
OHS CV CPX PATIENT AGE: 54
OHS CV CPX PATIENT HEIGHT IN: 63
OHS CV CPX PATIENT IS FEMALE AGE 11-19: 0
OHS CV CPX PATIENT IS FEMALE AGE GREATER THAN 19: 1
OHS CV CPX PATIENT IS FEMALE AGE LESS THAN 11: 0
OHS CV CPX PATIENT IS FEMALE: 1
OHS CV CPX PATIENT IS MALE AGE 11-25: 0
OHS CV CPX PATIENT IS MALE AGE GREATER THAN 25: 0
OHS CV CPX PATIENT IS MALE AGE LESS THAN 11: 0
OHS CV CPX PATIENT IS MALE GREATER THAN 18: 0
OHS CV CPX PATIENT IS MALE LESS THAN OR EQUAL TO 18: 0
OHS CV CPX PATIENT IS MALE: 0
OHS CV CPX PATIENT WEIGHT RETURNED IN OZ: 3344
OHS CV CPX PEAK DIASTOLIC BLOOD PRESSURE: 75 MMHG
OHS CV CPX PEAK HEAR RATE: 102 BPM
OHS CV CPX PEAK RATE PRESSURE PRODUCT: NORMAL
OHS CV CPX PEAK SYSTOLIC BLOOD PRESSURE: 129 MMHG
OHS CV CPX PERCENT BODY FAT: 29.5
OHS CV CPX PERCENT MAX PREDICTED HEART RATE ACHIEVED: 64
OHS CV CPX PREDICTED VO2: 30.2 ML/KG/MIN
OHS CV CPX RATE PRESSURE PRODUCT PRESENTING: 6566
OHS CV CPX REST PET CO2: 29
OHS CV CPX VE/VCO2 SLOPE: 34.3
STRESS ECHO POST EXERCISE DUR MIN: 3 MINUTES
STRESS ECHO POST EXERCISE DUR SEC: 50 SECONDS
SYSTOLIC BLOOD PRESSURE: 134 MMHG

## 2025-04-16 PROCEDURE — 94621 CARDIOPULM EXERCISE TESTING: CPT | Mod: 26,,, | Performed by: INTERNAL MEDICINE

## 2025-04-16 PROCEDURE — 94621 CARDIOPULM EXERCISE TESTING: CPT

## 2025-04-17 NOTE — PROGRESS NOTES
HISTORY: S/P CABG (25), CAD, HTN, EF=60-65%(-)    ANTHROPOMETRICS:     PRE   Height (in) 63   Weight (lb) 209   BMI 37.0   Abdominal Girth (in) 45.5   % Body Fat 29.5%       LAB RESULTS:    Lab Results   Component Value Date    HGB 12.3 2025     Lab Results   Component Value Date    HCT 40.1 2025     Lab Results   Component Value Date    MPV 11.6 2025       Lab Results   Component Value Date    CHOL 129 2025     Lab Results   Component Value Date    HDL 46 2025     Lab Results   Component Value Date    LDL 71.6 2025     Lab Results   Component Value Date    TRIG 57 2025     Lab Results   Component Value Date    CHOLHDL 35.7 2025       Lab Results   Component Value Date    GLUCFAST 80 2025     Lab Results   Component Value Date    HGBA1C 5.6 2025        Lab Results   Component Value Date    HSCRP 2.77 2025         PSYCHOSOCIAL SCORES:    RADHIKA    Subscales  Well-being subscale: Friendliness: 0  Well-being subscale: Physical Well Bein  Well-being subscale: Contentment: 0  Well-being subscale: Relaxation: 0  Symptom subscale: Hostility: 0  Symptom subscale: Somatic Symptoms: 1  Symptom subscale: Depression: 0  Symptom subscale: Anxiety: 1    Radhika Ending Score  Total Score: Anxiety: 1  Total Score: Depression: 0  Total Score: Somatization: 5  Total Score: Hostility: 0            SF-36  36-Item Short Form Survey (SF-36) Scoring  Physical Functionin  Role limitations due to physical health: 0  Role limitations due to emotional problems: 0  Energy/fatigue: 50  Emotional well-bein  Social functionin  Pain: 52.5  General health: 75            PHQ-9:      2025     7:40 AM   PHQ-9 Depression Patient Health Questionnaire   Over the last two weeks how often have you been bothered by little interest or pleasure in doing things 2   Over the last two weeks how often have you been bothered by feeling down, depressed or hopeless  0   Over the last two weeks how often have you been bothered by trouble falling or staying asleep, or sleeping too much 0   Over the last two weeks how often have you been bothered by feeling tired or having little energy 2   Over the last two weeks how often have you been bothered by a poor appetite or overeating 0   Over the last two weeks how often have you been bothered by feeling bad about yourself - or that you are a failure or have let yourself or your family down 0   Over the last two weeks how often have you been bothered by trouble concentrating on things, such as reading the newspaper or watching television 0   Over the last two weeks how often have you been bothered by moving or speaking so slowly that other people could have noticed. 0   Over the last two weeks how often have you been bothered by thoughts that you would be better off dead, or of hurting yourself 0   If you checked off any problems, how difficult have these problems made it for you to do your work, take care of things at home or get along with other people? Somewhat difficult   PHQ-9 Score 4                   EDUCATION SCORES:     PRE   Education Score 50

## 2025-04-17 NOTE — PROGRESS NOTES
Session: Orientation     Cardiac Rehab Individual Treatment Plan - Initial Assessment      Patient Name: Pat Yung MRN: [id number]   : 1970   Age: 54 y.o.   Primary Diagnosis: CABG  Date of Event: 25  EF: 60-65%  Risk Stratification: high  Referring Physician:    Exercise Assessment:     CPX/TM Date: 25 Results   RHR 49   Max    Peak VO2 (CPX only) 10.5   Actual METS (CPX only) 3.0   Estimated METS 6.0     Anthropometrics    Height 63 inches   Weight 209 lbs   BMI 37.0   Abdominal Girth 45.5   Body Composition 29.5%     ST Depression noted on Stress Test?:No  Angina with exercise?: No   Fall Risk: Yes   Assistive Devices:  independent   Currently exercising? Some walking around the inside of her home  Pat stated there were no limitations to exercise but had a fall in her past that can cause her to have some left side lower back pain.     Exercise Plan:   Goals:  CR Exercise Goals: Attend Cardiac Rehab 3 times/week: In Progress  Home Aerobic Exercise: 2 additional days/week for 30-60 minutes: In Progress  Intensity of 12-15 on the Rate of Perceived Exertion (RPE) scale: In Progress  30% increase in entry estimated METS: 7.8 : In Progress  5 days/week for 30-60 minutes: In Progress  Demonstrate proper pulse taking technique: In Progress    Intervention:   Discussed importance of regular attendance to cardiac rehab class    Exercise Prescription:  THR Range 81-94   Mode: Treadmill  Recumbent Bike  Upright Bike  Nustep  Elliptical   Frequency:  3 days/week   Duration:  30 - 60 minutes   Intensity:  12 - 15 RPE   Resistance Training:  Yes: 3 to 5 lb weights with 10-15 reps based on strength and range of motion assessment     Home Prescription:  Mode Aerobic   Frequency: 2- 3 days/week   Duration: 30-60 minutes   Resistance Training: None        Education:  Orientation to Equipment; verbalizes understanding; Date: 25  Exercise Recommendations; verbalizes understanding; Date:  4-24-25  Exercise Safety; verbalizes understanding; Date: 4-24-25  Class Preparation: verbalizes understanding; Date: 4-24-25  Signs and symptoms to report: verbalizes understanding; Date: 4-24-25  Caffeine/Hydration: verbalizes understanding; Date: 4-24-25  Exercise Terminology: verbalizes understanding; Date: 4-24-25  Resistance Training: verbalizes understanding; Date: 4-24-25    Comments:  I encouraged Pat to begin thinking about some type of aerobic exercise she can participate in at least 2 non-rehab days per week for at least 30 minutes in addition to attending Phase II cardiac rehab classes 3 days per week.  She stated understanding.    All consent forms were signed, proper attire and shoes were discussed.       Pat will begin Cardiac Rehab on Monday, April 28 at 9:00 am.    The exercise prescription will be adjusted based on tolerance of exercise intensity by patient.    Lavell Egan., CEP

## 2025-04-21 ENCOUNTER — TELEPHONE (OUTPATIENT)
Dept: CARDIAC REHAB | Facility: CLINIC | Age: 55
End: 2025-04-21
Payer: COMMERCIAL

## 2025-04-21 NOTE — TELEPHONE ENCOUNTER
----- Message from COSME Rosario sent at 4/17/2025  1:02 PM CDT -----  Mauro Delaney MD  ----- Message -----  From: Amy Arguelles CEP  Sent: 4/17/2025   9:44 AM CDT  To: COSME Rosario    Good morning,Ms. Yung has been referred to Phase II cardiac rehab.  Unfortunately, we are not able to charge under an NP.  Is there a physician you work under that we can use as our charge doctor?  Thank you for your help.Amy Arguelles, NAKIA MonteExsadiaise PhysiologistO[facility]

## 2025-04-22 ENCOUNTER — TELEPHONE (OUTPATIENT)
Dept: CARDIAC REHAB | Facility: CLINIC | Age: 55
End: 2025-04-22
Payer: COMMERCIAL

## 2025-04-24 ENCOUNTER — CLINICAL SUPPORT (OUTPATIENT)
Dept: CARDIAC REHAB | Facility: CLINIC | Age: 55
End: 2025-04-24
Payer: COMMERCIAL

## 2025-04-24 VITALS
SYSTOLIC BLOOD PRESSURE: 110 MMHG | DIASTOLIC BLOOD PRESSURE: 68 MMHG | RESPIRATION RATE: 16 BRPM | HEART RATE: 84 BPM | OXYGEN SATURATION: 97 %

## 2025-04-24 DIAGNOSIS — I25.10 ATHEROSCLEROSIS OF NATIVE CORONARY ARTERY OF NATIVE HEART WITHOUT ANGINA PECTORIS: Primary | ICD-10-CM

## 2025-04-24 DIAGNOSIS — Z95.1 S/P CABG (CORONARY ARTERY BYPASS GRAFT): ICD-10-CM

## 2025-04-24 PROCEDURE — 93798 PHYS/QHP OP CAR RHAB W/ECG: CPT | Mod: S$GLB,,, | Performed by: INTERNAL MEDICINE

## 2025-04-24 PROCEDURE — 99999 PR PBB SHADOW E&M-EST. PATIENT-LVL III: CPT | Mod: PBBFAC,,,

## 2025-04-24 NOTE — PROGRESS NOTES
"Orientation   Cardiac Rehab Individual Treatment Plan - Initial Assessment      Patient Name: Pat Yung MRN: [id number]   : 1970   Age: 54 y.o.   Primary Diagnosis: CABG, HTN, CAD    Nutrition Assessment:     Anthropometrics    Height 63 inches   Weight 209 lbs   BMI 37   Abdominal Girth 45.5   Body Composition 29.5       Drug Allergies and Intolerances:  Review of patient's allergies indicates:   Allergen Reactions    Nexium [esomeprazole magnesium] Hives     Patient says that began having "break-outs" on her abdomen    Lisinopril Other (See Comments)     Cough        Food Allergies and Intolerances:  NA    Past Medical History:  Past Medical History:   Diagnosis Date    GERD (gastroesophageal reflux disease)     Herpes simplex without mention of complication     Hyperlipidemia     Hypertension     Ulcer     Vitamin D deficiency        Past Surgical History:  Past Surgical History:   Procedure Laterality Date    ANGIOGRAM, CORONARY ARTERY - Right Right 2025     SECTION  1998    x 1    COLONOSCOPY N/A 2021    Procedure: COLONOSCOPY;  Surgeon: Jesu Canas MD;  Location: [facility] ENDO;  Service: Endoscopy;  Laterality: N/A;  covid  stbernard -ml    CORONARY ANGIOGRAPHY Right 2025    Procedure: ANGIOGRAM, CORONARY ARTERY;  Surgeon: Mauro Delaney MD;  Location: [facility] CATH LAB;  Service: Cardiology;  Laterality: Right;    CORONARY ARTERY BYPASS GRAFT (CABG) N/A 2025    Procedure: CORONARY ARTERY BYPASS GRAFT x1 (CABG);  Surgeon: Josef Nugent MD;  Location: [facility] OR [address] [place] [address];  Service: Cardiothoracic;  Laterality: N/A;  LIMA to LAD    ESOPHAGOGASTRODUODENOSCOPY  2022    ESOPHAGOGASTRODUODENOSCOPY N/A 2022    Procedure: EGD (ESOPHAGOGASTRODUODENOSCOPY);  Surgeon: Bhupendra Delacruz MD;  Location: [facility] ENDO;  Service: Endoscopy;  Laterality: N/A;    TUBAL LIGATION         Medications:  Current Outpatient Medications   Medication Sig    amLODIPine " (NORVASC) 5 MG tablet Take 1 tablet (5 mg total) by mouth once daily.    aspirin (ECOTRIN) 81 MG EC tablet Take 1 tablet (81 mg total) by mouth once daily.    atorvastatin (LIPITOR) 40 MG tablet Take 1 tablet (40 mg total) by mouth every evening.    metoprolol tartrate (LOPRESSOR) 25 MG tablet Take 1 tablet (25 mg total) by mouth 2 (two) times daily.    omeprazole (PRILOSEC) 40 MG capsule Take 1 capsule (40 mg total) by mouth once daily. (Patient taking differently: Take 40 mg by mouth once daily. Takes OTC med prn)    valACYclovir (VALTREX) 1000 MG tablet Take 1 tablet (1,000 mg total) by mouth every 12 (twelve) hours. Begin taking at first sign of outbreak    oxyCODONE (OXY-IR) 5 mg Cap Take 5 mg by mouth every 4 (four) hours as needed.     No current facility-administered medications for this visit.       Vitamins and Supplements:  NA    Labs:  Patient confirms she is taking lipitor 40mg for cholesterol control.    Lab Results   Component Value Date    CHOL 129 04/16/2025     Lab Results   Component Value Date    HDL 46 04/16/2025     Lab Results   Component Value Date    LDL 71.6 04/16/2025     Lab Results   Component Value Date    TRIG 57 04/16/2025     Lab Results   Component Value Date    CHOLHDL 35.7 04/16/2025         Lab Results   Component Value Date    GLUCFAST 80 04/16/2025     Lab Results   Component Value Date    HGBA1C 5.6 02/20/2025       Nutrition/Diet History:  Patient eats 3 meals daily and 1-2 snacks daily.    Seasons food with Salt free seasonings.  Patient denies use of a salt shaker at the table on prepared foods.   Dines out 1-2 per month  Chooses fried foods stopped    Chooses fish 4-5 time(s) per week.   Beverages:  water and unsweet tea  Alcohol: none    24 Hour Recall:  Breakfast: 1 pancake  Lunch:baked chicken brussels sprouts  Dinner: salad with vinaigrette  Other: NA    Difficulty Chewing or Swallowing: No  Current Exercise: See Exercise Physiologist Note  Food Safety/Food  Preparation: spouse  Living Arrangements/Family Support: Lives with spouse  Cultural/Spiritual/Personal Preferences: not applicable   Barriers to Education: none identified  Stage of Change Related to Diet Habits: Action    Nutrition Diagnosis:  Food and nutrition related knowledge deficit related to the lack of prior nutrition education as evidenced by diet history and 24 hour recall    Nutrition Plan:   Goals:  LDL-C < 70 (for high risk patients)  Hgb A1c < 7%  BMI < 25 and abdominal girth < 40M/<35 F  2 gram sodium, Mediterranean diet  Rehab weight goal: -10  Fish intake (non-fried varieties) to a goal of 2-3 servings per week.   Increase fruit and vegetable intake    Interventions/Recommendations:  Lab results reviewed and discussed  Nutrition Prescription:  Total Energy Estimated Needs: 9730-9908 Kcal/d for weight loss  Method for Estimating Needs: 20-25kcal/kg ABW  Total Protein Estimated Needs: 63-82 g/d  Method for Estimating Needs: 1-1.3g/Kg ABW  Total Fluid Estimated Needs: 1 mL/Kcal  Dietitian Consult: No  Patient to participate in Cardiac Rehab sessions three times a week  Weekly Dietitian Weight Check  Encouraged patient to complete 3 day food diary  Follow Up Plan for Ongoing Self-Management Support    Education:  Mediterranean Diet; verbalizes understanding; Date: 4/24/25  Person taught: patient  Preferred Learning Method: Verbal, Written  Education Needed/Provided: Nutrition counseling and education related to cardiac rehabilitation  Education Method: Weekly nutrition lectures on the Mediterranean diet, cooking, shopping, and dining out  Written Materials Provided: 3 Day Food Record, Introduction to Mediterranean Diet  Strategies Implemented: Motivational interviewing, Goal setting, Self-Monitoring, and Problem Solving    Comments:   Discussed ways to incorporate healthy snacks, eating on a schedule, and monitoring sodium intake for heart health.  Pt has made significant changes since event-she has  stopped eating any fried foods as well as reading labels to limit sodium and increase fiber. Cooking primarily at home with whole food ingredients and salmon as her primary protein intake. She has also begun to be much more mindful of fruit/veg intake. She is very motivated and has great family support    Diabetes  Is the patient diabetic? No      RD contact information provided.      Becca Jones MS, RDN/LDN

## 2025-04-24 NOTE — PROGRESS NOTES
Session: Orientation   Cardiac Rehab Individual Treatment Plan - Initial Assessment      Patient Name: Pat Yung MRN: [id number]   : 1970   Age: 54 y.o.   Date of Event: 2025   Primary Diagnosis: S/P CABG    EF: 60-65%    Physical Assessment:   /68 (BP Location: Right arm, Patient Position: Standing)   Pulse 84   Resp 16   LMP 2023 (Exact Date)   SpO2 97%     ASSESSMENT:  Heart Sounds: regular rate and rhythm  Prosthetic Valve: No  Lung Sounds: normal air entry, lungs clear to auscultation  Capillary Refill: normal  Left Radial Pulse: Normal (+2)  Right Radial Pulse: Normal (+2)  Left Pedal Pulse: Normal (+2)  Right Pedal Pulse: Normal (+2)  Right Edema: Trace  Left Edema 1+  Strength: good  Range of Motion: full range of motion  Existing Limitations:     Site   Arthritis, bursitis    Amputation, atrophy    Other: C/O soreness in chest with arm movement, advised pt to wear soft supportive bra         Diabetic patient's foot examination comments: None -  Neither  Incisional site: healing well  Special needs: N/A    Psychosocial Assessment:   Outcome Survey Tools:    RADHIKA SCORES:    Subscales  Well-being subscale: Friendliness: 0  Well-being subscale: Physical Well Bein  Well-being subscale: Contentment: 0  Well-being subscale: Relaxation: 0  Symptom subscale: Hostility: 0  Symptom subscale: Somatic Symptoms: 1  Symptom subscale: Depression: 0  Symptom subscale: Anxiety: 1    Radhika Ending Score  Total Score: Anxiety: 1  Total Score: Depression: 0  Total Score: Somatization: 5  Total Score: Hostility: 0            SF-36  36-Item Short Form Survey (SF-36) Scoring  Physical Functionin  Role limitations due to physical health: 0  Role limitations due to emotional problems: 0  Energy/fatigue: 50  Emotional well-bein  Social functionin  Pain: 52.5  General health: 75            PHQ-9:      2025     7:40 AM   PHQ-9 Depression Patient Health Questionnaire   Over  the last two weeks how often have you been bothered by little interest or pleasure in doing things 2   Over the last two weeks how often have you been bothered by feeling down, depressed or hopeless 0   Over the last two weeks how often have you been bothered by trouble falling or staying asleep, or sleeping too much 0   Over the last two weeks how often have you been bothered by feeling tired or having little energy 2   Over the last two weeks how often have you been bothered by a poor appetite or overeating 0   Over the last two weeks how often have you been bothered by feeling bad about yourself - or that you are a failure or have let yourself or your family down 0   Over the last two weeks how often have you been bothered by trouble concentrating on things, such as reading the newspaper or watching television 0   Over the last two weeks how often have you been bothered by moving or speaking so slowly that other people could have noticed. 0   Over the last two weeks how often have you been bothered by thoughts that you would be better off dead, or of hurting yourself 0   If you checked off any problems, how difficult have these problems made it for you to do your work, take care of things at home or get along with other people? Somewhat difficult   PHQ-9 Score 4              Living Arrangements: Lives with spouse  Family Support: children, grandchildren, and spouse  Self Reported: Effective Coping Skills  Displays: happiness and calmness  Medication: not applicable    Psychosocial Plan:   Goals:  Improved psychosocial coping strategies  Maintain positive support system  Maintain positive outlook  Improve overall quality of life    Interventions/Recommendations:  Discussed Results of Surveys  Patient to Self Report Emotional Changes at Session Check In  Recommend Physical Activity  Recommend Attending Education Lectures  Notify MD: Declined  Program Referral: Declined  Pharmaceutical Intervention/Therapy:  Declined  Other Needs: not applicable  Stage of Readiness to Change: Preparation    Education:  Stress; verbalizes understanding; Date: 4/24/2025    Comments:  Pt denies any overwhelming stress or anxiety. Admits that she is not feeling upbeat yet due to physical deconditioning.  Patient has been instructed to notify staff in the event that circumstances worsen.  Patient verbalizes understanding.    Other Core Components/Risk Factors Assessment:   RISK FACTORS:  hyperlipidemia, hypertension, obesity, positive family history    Learning Barriers: None    Education Level:  Attended College/Technical School    Pre-test Score: 50    Medication Compliance: has been compliant with taking medications    Other Core Components/Risk Factors Plan:   Goals:  Decrease cholesterol level: Met  Increase exercise tolerance: In Progress  Increase knowledge of CAD: In Progress  Decrease blood pressure: Met  Weight loss: In Progress  Learn more about healthy eating: In Progress    Interventions/Recommendations:  Recommend regular attendance for Cardiac Rehab: Exercise and Education Lectures  Encourage medication compliance  Individual Education/ Counseling: Yes  Physician Referral: No    Education:    risk factors, verbalizes understanding; Date: 4/24/2025         Education method adapted to patients education level and preferred method of learning.  Method: explanation    Comments:  Pt is monitoring her sodium intake, eating less, walking for exercise and very motivated to continue managing her risk factors for heart disease.    Other Core Components/Hypertension Assessment:   Resting BP: 122/80  BP Readings from Last 1 Encounters:   04/24/25 110/68         BP Diagnosis: Hypertensive  Patient reported symptoms: tiredness/fatigue    Medications:  Medication Prescribed? Adherent? Exception   Beta-blocker [x]Yes  []No  []Unknown [x]Yes  []No  []Unknown    ACEI/ARB []Yes  [x]No  []Unknown []Yes  []No  []Unknown    Calcium Channel Blocker  [x]Yes  []No  []Unknown [x]Yes  []No  []Unknown    Diuretic []Yes  [x]No  []Unknown []Yes  []No  []Unknown        Other Core Components/Hypertension Plan:   Goals:  Blood Pressure <130/80    Interventions/Recommendations:  Med Card Reconciled: Yes  Encourage medication compliance  Encourage sodium reduction  Reduce alcohol consumption  Encourage weight loss  Recommend physical activity  Educate on contributory factors  Reduce stress, anxiety, anger, depression, and/or chronic pain  Encourage home blood pressure monitoring  Recommend daily weights  MD notified/Physician Referral: No    Education:    Hypertension; verbalizes understanding; Date: 4/24/2025  Risk Factors; verbalizes understanding; Date: 4/24/2025         Comments:  Pt will be monitoring her blood pressure at home keeping a log for her providers. Pt will continue reducing her sodium intake and walking regularly for exercise.      Does the patient have Heart Failure? No    Other Core Components/Tobacco Cessation Assessment:   Smoking Status: Lifetime Non-smoker  Primary Tobacco Type: N/A  Tobacco Usage: no  Smoking Cessation Barriers:  N/A  Stage of Readiness to Change: Maintenance    Other Core Components/Tobacco Cessation Plan:   Goals:  Maintain non-smoking status    Interventions:  Maintains non-smoking status    Education:    Coronary Artery Disease; verbalizes understanding; Date: 4/24/2025         Comments:  Pt never uses tobacco products.    Discussed Cardiac Rehab program in depth with patient.  Medication list updated per patient & marked as reviewed.  Patient has been instructed to notify staff of any problems while attending rehab (ie: chest pain, shortness of breath, lightheadedness, dizziness).  Patient has been instructed to monitor blood pressure readings outside of rehab & to keep a daily log of the readings.  Patient verbalizes understanding.    Pal Brock RN

## 2025-04-24 NOTE — PROGRESS NOTES
"Orientation   Cardiac Rehab Individual Treatment Plan - Initial Assessment      Patient Name: Pat Yung MRN: [id number]   : 1970   Age: 54 y.o.   Primary Diagnosis: CABG, HTN, CAD    Nutrition Assessment:     Anthropometrics    Height 63 inches   Weight 209 lbs   BMI 37   Abdominal Girth 45.5   Body Composition 29.5       Drug Allergies and Intolerances:  Review of patient's allergies indicates:   Allergen Reactions    Nexium [esomeprazole magnesium] Hives     Patient says that began having "break-outs" on her abdomen    Lisinopril Other (See Comments)     Cough        Food Allergies and Intolerances:  NA    Past Medical History:  Past Medical History:   Diagnosis Date    GERD (gastroesophageal reflux disease)     Herpes simplex without mention of complication     Hyperlipidemia     Hypertension     Ulcer     Vitamin D deficiency        Past Surgical History:  Past Surgical History:   Procedure Laterality Date    ANGIOGRAM, CORONARY ARTERY - Right Right 2025     SECTION  1998    x 1    COLONOSCOPY N/A 2021    Procedure: COLONOSCOPY;  Surgeon: Jesu Canas MD;  Location: [facility] ENDO;  Service: Endoscopy;  Laterality: N/A;  covid  stbernard -ml    CORONARY ANGIOGRAPHY Right 2025    Procedure: ANGIOGRAM, CORONARY ARTERY;  Surgeon: Mauro Delaney MD;  Location: [facility] CATH LAB;  Service: Cardiology;  Laterality: Right;    CORONARY ARTERY BYPASS GRAFT (CABG) N/A 2025    Procedure: CORONARY ARTERY BYPASS GRAFT x1 (CABG);  Surgeon: Josef Nugent MD;  Location: [facility] OR [address] [place] [address];  Service: Cardiothoracic;  Laterality: N/A;  LIMA to LAD    ESOPHAGOGASTRODUODENOSCOPY  2022    ESOPHAGOGASTRODUODENOSCOPY N/A 2022    Procedure: EGD (ESOPHAGOGASTRODUODENOSCOPY);  Surgeon: Bhupendra Delacruz MD;  Location: [facility] ENDO;  Service: Endoscopy;  Laterality: N/A;    TUBAL LIGATION         Medications:  Current Outpatient Medications   Medication Sig    amLODIPine " (NORVASC) 5 MG tablet Take 1 tablet (5 mg total) by mouth once daily.    aspirin (ECOTRIN) 81 MG EC tablet Take 1 tablet (81 mg total) by mouth once daily.    atorvastatin (LIPITOR) 40 MG tablet Take 1 tablet (40 mg total) by mouth every evening.    metoprolol tartrate (LOPRESSOR) 25 MG tablet Take 1 tablet (25 mg total) by mouth 2 (two) times daily.    omeprazole (PRILOSEC) 40 MG capsule Take 1 capsule (40 mg total) by mouth once daily.    oxyCODONE (OXY-IR) 5 mg Cap Take 5 mg by mouth every 4 (four) hours as needed.    valACYclovir (VALTREX) 1000 MG tablet Take 1 tablet (1,000 mg total) by mouth every 12 (twelve) hours. Begin taking at first sign of outbreak     No current facility-administered medications for this visit.       Vitamins and Supplements:  NA    Labs:  Patient confirms she is taking lipitor 40mg for cholesterol control.    Lab Results   Component Value Date    CHOL 129 04/16/2025     Lab Results   Component Value Date    HDL 46 04/16/2025     Lab Results   Component Value Date    LDL 71.6 04/16/2025     Lab Results   Component Value Date    TRIG 57 04/16/2025     Lab Results   Component Value Date    CHOLHDL 35.7 04/16/2025         Lab Results   Component Value Date    GLUCFAST 80 04/16/2025     Lab Results   Component Value Date    HGBA1C 5.6 02/20/2025       Nutrition/Diet History:  Patient eats 3 meals daily and 1-2 snacks daily.    Seasons food with Salt free seasonings.  Patient denies use of a salt shaker at the table on prepared foods.   Dines out 1-2 per month  Chooses fried foods stopped    Chooses fish 4-5 time(s) per week.   Beverages:  water and unsweet tea  Alcohol: none    24 Hour Recall:  Breakfast: 1 pancake  Lunch:baked chicken brussels sprouts  Dinner: salad with vinaigrette  Other: NA    Difficulty Chewing or Swallowing: No  Current Exercise: See Exercise Physiologist Note  Food Safety/Food Preparation: spouse  Living Arrangements/Family Support: Lives with  spouse  Cultural/Spiritual/Personal Preferences: not applicable   Barriers to Education: none identified  Stage of Change Related to Diet Habits: Action    Nutrition Diagnosis:  Food and nutrition related knowledge deficit related to the lack of prior nutrition education as evidenced by diet history and 24 hour recall    Nutrition Plan:   Goals:  LDL-C < 70 (for high risk patients)  Hgb A1c < 7%  BMI < 25 and abdominal girth < 40M/<35 F  2 gram sodium, Mediterranean diet  Rehab weight goal: -10  Fish intake (non-fried varieties) to a goal of 2-3 servings per week.   Increase fruit and vegetable intake    Interventions/Recommendations:  Lab results reviewed and discussed  Nutrition Prescription:  Total Energy Estimated Needs: 9576-2909 Kcal/d for weight loss  Method for Estimating Needs: 20-25kcal/kg ABW  Total Protein Estimated Needs: 63-82 g/d  Method for Estimating Needs: 1-1.3g/Kg ABW  Total Fluid Estimated Needs: 1 mL/Kcal  Dietitian Consult: No  Patient to participate in Cardiac Rehab sessions three times a week  Weekly Dietitian Weight Check  Encouraged patient to complete 3 day food diary  Follow Up Plan for Ongoing Self-Management Support    Education:  Mediterranean Diet; verbalizes understanding; Date: 4/24/25  Person taught: patient  Preferred Learning Method: Verbal, Written  Education Needed/Provided: Nutrition counseling and education related to cardiac rehabilitation  Education Method: Weekly nutrition lectures on the Mediterranean diet, cooking, shopping, and dining out  Written Materials Provided: 3 Day Food Record, Introduction to Mediterranean Diet  Strategies Implemented: Motivational interviewing, Goal setting, Self-Monitoring, and Problem Solving    Comments:   Discussed ways to incorporate healthy snacks, eating on a schedule, and monitoring sodium intake for heart health.  Pt has made significant changes since event-she has stopped eating any fried foods as well as reading labels to limit  sodium and increase fiber. Cooking primarily at home with whole food ingredients and salmon as her primary protein intake. She has also begun to be much more mindful of fruit/veg intake. She is very motivated and has great family support    Diabetes  Is the patient diabetic? No      RD contact information provided.      Becca Jones MS, RDN/LDN

## 2025-04-28 ENCOUNTER — CLINICAL SUPPORT (OUTPATIENT)
Dept: CARDIAC REHAB | Facility: CLINIC | Age: 55
End: 2025-04-28
Payer: COMMERCIAL

## 2025-04-28 DIAGNOSIS — I25.10 ATHEROSCLEROSIS OF NATIVE CORONARY ARTERY OF NATIVE HEART, UNSPECIFIED WHETHER ANGINA PRESENT: ICD-10-CM

## 2025-04-28 DIAGNOSIS — Z95.1 POSTSURGICAL AORTOCORONARY BYPASS STATUS: Primary | ICD-10-CM

## 2025-04-28 PROCEDURE — 93798 PHYS/QHP OP CAR RHAB W/ECG: CPT | Mod: S$GLB,,, | Performed by: INTERNAL MEDICINE

## 2025-04-29 ENCOUNTER — TELEPHONE (OUTPATIENT)
Dept: CARDIOTHORACIC SURGERY | Facility: CLINIC | Age: 55
End: 2025-04-29
Payer: COMMERCIAL

## 2025-04-29 ENCOUNTER — PATIENT MESSAGE (OUTPATIENT)
Dept: CARDIOTHORACIC SURGERY | Facility: CLINIC | Age: 55
End: 2025-04-29
Payer: COMMERCIAL

## 2025-04-29 NOTE — TELEPHONE ENCOUNTER
Received call from pt reporting she has sutures on the right side of her neck. She says there are three pieces of suture sticking out but that her skin has grown over part of the suture thread. Asked pt to send picture. Discussed with GOLDIE Tatum who recommended appointment for assessment and removal. Pt agreeable and would like to come in tomorrow. Scheduled pt for tomorrow and requested she send picture via portal.

## 2025-04-30 ENCOUNTER — OFFICE VISIT (OUTPATIENT)
Dept: CARDIOTHORACIC SURGERY | Facility: CLINIC | Age: 55
End: 2025-04-30
Payer: COMMERCIAL

## 2025-04-30 ENCOUNTER — TELEPHONE (OUTPATIENT)
Dept: CARDIOTHORACIC SURGERY | Facility: CLINIC | Age: 55
End: 2025-04-30
Payer: COMMERCIAL

## 2025-04-30 DIAGNOSIS — Z95.1 S/P CABG X 1: Primary | ICD-10-CM

## 2025-04-30 PROCEDURE — 3044F HG A1C LEVEL LT 7.0%: CPT | Mod: CPTII,S$GLB,, | Performed by: NURSE PRACTITIONER

## 2025-04-30 PROCEDURE — 99024 POSTOP FOLLOW-UP VISIT: CPT | Mod: S$GLB,,, | Performed by: NURSE PRACTITIONER

## 2025-04-30 PROCEDURE — 4010F ACE/ARB THERAPY RXD/TAKEN: CPT | Mod: CPTII,S$GLB,, | Performed by: NURSE PRACTITIONER

## 2025-04-30 NOTE — TELEPHONE ENCOUNTER
Called pt about cancelled appt, pt stated she did not mean to cancel the appt and is on her way. Rescheduled appt with NP Deepti.

## 2025-04-30 NOTE — PROGRESS NOTES
Patient seen and examined. Patient is progressively increasing activity. No significant complaints.  Suture x2 left in neck from what appears to be central line.  On exam, no drainage noted to area, patient does report tenderness.  Sutures were able to be removed easily, no suture knot noted          Assessment: 2/24/2025  Coronary artery bypass grafting x 1 with left internal mammary artery to left   anterior descending     Plan:  Contact clinic for any concerns related to surgery  Okay to use Magnesium Citrate for constipation.  Recommended 1/2 bottle to start with        No scheduled appointment, RTC prn

## 2025-05-02 ENCOUNTER — CLINICAL SUPPORT (OUTPATIENT)
Dept: CARDIAC REHAB | Facility: CLINIC | Age: 55
End: 2025-05-02
Payer: COMMERCIAL

## 2025-05-02 DIAGNOSIS — I25.10 ATHEROSCLEROSIS OF NATIVE CORONARY ARTERY OF NATIVE HEART, UNSPECIFIED WHETHER ANGINA PRESENT: ICD-10-CM

## 2025-05-02 DIAGNOSIS — Z95.1 POSTSURGICAL AORTOCORONARY BYPASS STATUS: Primary | ICD-10-CM

## 2025-05-05 ENCOUNTER — TELEPHONE (OUTPATIENT)
Dept: CARDIOLOGY | Facility: CLINIC | Age: 55
End: 2025-05-05
Payer: COMMERCIAL

## 2025-05-05 PROBLEM — J90 PLEURAL EFFUSION: Status: ACTIVE | Noted: 2025-05-05

## 2025-05-05 NOTE — TELEPHONE ENCOUNTER
----- Message from Tiffany sent at 5/5/2025  1:50 PM CDT -----  Regarding: Plan of Care  Contact: [phone]  Type:  Needs Medical AdviceWho Called: Demetrioould the patient rather a call back or a response via [facility]? callBest Call Back Number: [phone] Information: Calling to speak with provider/nurse regarding plan of care, no further information given. Please call and discuss with patient as soon as possible.

## 2025-05-05 NOTE — TELEPHONE ENCOUNTER
Spoke with patient, she was seen in the ER today and advised to follow up with cardiology this week.  She scheduled herself for a follow up 05/26/2025.  Informed patient that I had a cancellation on 05/08/2025 at 12:00.  Patient accepted the appointment.

## 2025-05-06 PROCEDURE — 93010 ELECTROCARDIOGRAM REPORT: CPT | Mod: S$GLB,,, | Performed by: INTERNAL MEDICINE

## 2025-05-07 ENCOUNTER — CLINICAL SUPPORT (OUTPATIENT)
Dept: CARDIAC REHAB | Facility: CLINIC | Age: 55
End: 2025-05-07
Payer: COMMERCIAL

## 2025-05-07 DIAGNOSIS — R07.89 CHEST DISCOMFORT: Primary | ICD-10-CM

## 2025-05-07 NOTE — PROGRESS NOTES
"Patient arrived for cardiac rehab session. Pt reports to have eaten prior to exercise session.  The patient was on continuous EKG monitoring throughout the session. Pt's monitor strips had changes from previous visits with inverted T wave, tachycardia and ectopy. While pt questioned pt states she has some "fluid" in her lungs and is feeling a fluttering in her chest. Pt stopped and placed to rest in chair. Dr. Hernandez notified and reviewed monitor strips recommends 12 lead EKG. EKG done reviewed by Dr. Hernandez and states it is back to baseline. Pt feeling much better. Pt states she had gone to ER on 5/05/25 for dry cough and not feeling well. Pt states they told her she has a pleural effusion. Pt has appt with ARTURO Rosario tomorrow, Dr. Hernandez examined pt and recommends no exercise today and for pt to be seen then and to not return to cardiac rehab until her pleural effusion has been addressed. Pt states she also has an appt with her pulmonologist next week 5/15/2025. Pt verbalized understanding she is not able to return to cardiac rehab until cleared by her cardiologist and pulmonologist. Pt discharged to home.  "

## 2025-05-08 ENCOUNTER — OFFICE VISIT (OUTPATIENT)
Dept: CARDIOLOGY | Facility: CLINIC | Age: 55
End: 2025-05-08
Payer: COMMERCIAL

## 2025-05-08 VITALS
SYSTOLIC BLOOD PRESSURE: 118 MMHG | HEART RATE: 60 BPM | DIASTOLIC BLOOD PRESSURE: 62 MMHG | WEIGHT: 200.31 LBS | HEIGHT: 63 IN | OXYGEN SATURATION: 99 % | BODY MASS INDEX: 35.49 KG/M2

## 2025-05-08 DIAGNOSIS — I25.10 ATHEROSCLEROSIS OF NATIVE CORONARY ARTERY OF NATIVE HEART WITHOUT ANGINA PECTORIS: ICD-10-CM

## 2025-05-08 DIAGNOSIS — I10 HYPERTENSION, UNSPECIFIED TYPE: Primary | ICD-10-CM

## 2025-05-08 DIAGNOSIS — E66.9 OBESITY (BMI 30-39.9): ICD-10-CM

## 2025-05-08 DIAGNOSIS — Z95.1 POSTSURGICAL AORTOCORONARY BYPASS STATUS: ICD-10-CM

## 2025-05-08 DIAGNOSIS — Z95.1 HX OF CABG: ICD-10-CM

## 2025-05-08 DIAGNOSIS — J90 PLEURAL EFFUSION: ICD-10-CM

## 2025-05-08 LAB
OHS QRS DURATION: 74 MS
OHS QTC CALCULATION: 454 MS

## 2025-05-08 PROCEDURE — 99999 PR PBB SHADOW E&M-EST. PATIENT-LVL III: CPT | Mod: PBBFAC,,,

## 2025-05-08 NOTE — PROGRESS NOTES
[facility] - Cardiology [id number]  Cardiology Clinic Note      5/8/2025  12:51 PM    Problem list  Problem List[1]    History of Present Illness    CHIEF COMPLAINT:  Patient presents today for follow up after recent ER visit for cough.    HISTORY OF PRESENT ILLNESS:  She recently visited the ER for pleural effusion, presenting with a dry cough without associated dyspnea. Chest XR confirmed pleural effusion. She was started on Lasix but reports minimal increase in urination frequency. She experiences bilateral leg swelling with sensation of tightness, particularly when legs are dependent. She is unable to lay flat and sleeps in a chair. She reports near-syncope with positional changes.    PHYSICAL ACTIVITY:  She was unable to complete cardiac rehab yesterday due to cardiac symptoms. She engages in walking at home but requires breaks and rest periods when becoming too active.    DIET:  She follows a Mediterranean-style vegetarian diet and denies interest in incorporating lean meats.    CURRENT MEDICATIONS:  She is currently taking cholesterol medication, ASA 81 mg, and recently started Lasix with 27 days remaining.      ROS:  General: -fever, -chills, -fatigue, -weight gain, -weight loss  Eyes: -vision changes, -redness, -discharge  ENT: -ear pain, -nasal congestion, -sore throat  Cardiovascular: -chest pain, -palpitations, +lower extremity edema, +dyspnea at rest, +exercise intolerance  Respiratory: +cough, -shortness of breath  Gastrointestinal: -abdominal pain, -nausea, -vomiting, -diarrhea, -constipation, -blood in stool  Genitourinary: -dysuria, -hematuria, -frequency  Musculoskeletal: -joint pain, -muscle pain, +limb pain  Skin: -rash, -lesion  Neurological: -headache, -dizziness, -numbness, -tingling, +lightheadedness  Psychiatric: -anxiety, -depression, -sleep difficulty           Medications  Current Medications[2]   Prior to Admission medications    Medication Sig Start Date End Date Taking? Authorizing  Provider   amLODIPine (NORVASC) 5 MG tablet Take 1 tablet (5 mg total) by mouth once daily. 25  Yes Tequila Mckeon PA-C   aspirin (ECOTRIN) 81 MG EC tablet Take 1 tablet (81 mg total) by mouth once daily. 10/29/24 10/29/25 Yes Aliyah Uriostegui MD   atorvastatin (LIPITOR) 40 MG tablet Take 1 tablet (40 mg total) by mouth every evening. 10/29/24 10/29/25 Yes Aliyah Uriostegui MD   furosemide (LASIX) 40 MG tablet Take 1 tablet (40 mg total) by mouth once daily. 25 Yes Jazz Martinez NP   metoprolol tartrate (LOPRESSOR) 25 MG tablet Take 1 tablet (25 mg total) by mouth 2 (two) times daily. 25 Yes Tequila Mckeon PA-C   omeprazole (PRILOSEC) 40 MG capsule Take 1 capsule (40 mg total) by mouth once daily. 3/31/25 3/31/26 Yes Jero Weinberg MD   oxyCODONE (OXY-IR) 5 mg Cap Take 5 mg by mouth every 4 (four) hours as needed.   Yes Provider, Historical   valACYclovir (VALTREX) 1000 MG tablet Take 1 tablet (1,000 mg total) by mouth every 12 (twelve) hours. Begin taking at first sign of outbreak 7/10/24  Yes Jero Weinberg MD         History  Past Medical History:   Diagnosis Date    GERD (gastroesophageal reflux disease)     Herpes simplex without mention of complication     Hyperlipidemia     Hypertension     Ulcer     Vitamin D deficiency      Past Surgical History:   Procedure Laterality Date    ANGIOGRAM, CORONARY ARTERY - Right Right 2025     SECTION  1998    x 1    COLONOSCOPY N/A 2021    Procedure: COLONOSCOPY;  Surgeon: Jesu Canas MD;  Location: [facility] ENDO;  Service: Endoscopy;  Laterality: N/A;  covid  stbernard -ml    CORONARY ANGIOGRAPHY Right 2025    Procedure: ANGIOGRAM, CORONARY ARTERY;  Surgeon: Mauro Delaney MD;  Location: [facility] CATH LAB;  Service: Cardiology;  Laterality: Right;    CORONARY ARTERY BYPASS GRAFT (CABG) N/A 2025    Procedure: CORONARY ARTERY BYPASS GRAFT x1 (CABG);  Surgeon: Josef Nugent MD;   "Location: [facility] OR [address] [place] [address];  Service: Cardiothoracic;  Laterality: N/A;  LIMA to LAD    ESOPHAGOGASTRODUODENOSCOPY  12/28/2022    ESOPHAGOGASTRODUODENOSCOPY N/A 12/28/2022    Procedure: EGD (ESOPHAGOGASTRODUODENOSCOPY);  Surgeon: Bhupendra Delacruz MD;  Location: [facility];  Service: Endoscopy;  Laterality: N/A;    TUBAL LIGATION  1998     Social History[3]      Allergies  Review of patient's allergies indicates:   Allergen Reactions    Nexium [esomeprazole magnesium] Hives     Patient says that began having "break-outs" on her abdomen    Lisinopril Other (See Comments)     Cough          Review of Systems   Review of Systems   Constitutional: Negative for chills, decreased appetite, diaphoresis, fever, malaise/fatigue, weight gain and weight loss.   Eyes:  Negative for blurred vision.   Cardiovascular:  Positive for leg swelling. Negative for chest pain, claudication, dyspnea on exertion, irregular heartbeat, near-syncope, orthopnea, palpitations, paroxysmal nocturnal dyspnea and syncope.   Respiratory:  Positive for cough. Negative for shortness of breath, snoring, sputum production and wheezing.    Endocrine: Negative for cold intolerance, heat intolerance, polydipsia, polyphagia and polyuria.   Skin:  Negative for color change, dry skin, itching, nail changes and poor wound healing.   Musculoskeletal:  Negative for back pain, gout, joint pain and joint swelling.   Gastrointestinal:  Negative for bloating, abdominal pain, constipation, diarrhea, hematemesis, hematochezia, melena, nausea and vomiting.   Genitourinary:  Negative for dysuria and hematuria.   Neurological:  Negative for dizziness, headaches, light-headedness, numbness, paresthesias and weakness.   Psychiatric/Behavioral:  Negative for altered mental status, depression and memory loss.          Physical Exam  Wt Readings from Last 1 Encounters:   05/08/25 90.9 kg (200 lb 4.6 oz)     BP Readings from Last 3 Encounters:   05/08/25 118/62   05/05/25 " 139/67   04/24/25 110/68     Pulse Readings from Last 1 Encounters:   05/08/25 60     Body mass index is 35.48 kg/m².    Physical Exam  Constitutional:       General: She is not in acute distress.  HENT:      Head: Normocephalic and atraumatic.      Mouth/Throat:      Mouth: Mucous membranes are moist.   Eyes:      Extraocular Movements: Extraocular movements intact.      Pupils: Pupils are equal, round, and reactive to light.   Neck:      Vascular: No carotid bruit or JVD.   Cardiovascular:      Rate and Rhythm: Normal rate and regular rhythm.      Heart sounds: No murmur heard.     No friction rub. No gallop.   Pulmonary:      Effort: Pulmonary effort is normal.      Breath sounds: Normal breath sounds.   Abdominal:      General: Abdomen is flat.      Palpations: Abdomen is soft.   Musculoskeletal:      Right lower leg: Edema present.      Left lower leg: Edema present.   Skin:     General: Skin is warm.      Capillary Refill: Capillary refill takes less than 2 seconds.   Neurological:      General: No focal deficit present.   Psychiatric:         Mood and Affect: Mood normal.         Assessment & Plan    IMPRESSION:  - Pleural effusion post-CABG has improved with Lasix.  - Ordered echocardiogram to rule out heart failure.    HYPERTENSION, UNSPECIFIED TYPE:  - Continued blood pressure medication.  - Educated on importance of fluid management and sodium restriction in cardiac health.  - Limit fluid intake to 2 L per day, reduce sodium intake.    ATHEROSCLEROSIS OF NATIVE CORONARY ARTERY OF NATIVE HEART WITHOUT ANGINA PECTORIS  POSTSURGICAL AORTOCORONARY BYPASS STATUS:  - Continued cholesterol medication.  - Continued aspirin 81 mg.  - Ordered echocardiogram to rule out heart failure.  - Follow up in 3 months.  - Contact the office if echo results show any concerning findings.  - Discussed symptoms of heart failure, including dry cough and leg swelling.    PLEURAL EFFUSION:  - Explained pleural effusion as a  potential complication after CABG.  - Encouraged to follow up with Dr. Nugent's office.  - Echo ordered.  - Continued Lasix: Take as prescribed until supply runs out (approximately 24-27 days remaining).    OBESITY  - Encouraged a heart healthy diet that is low in saturated fats and sodium.  - Also encouraged an increase in activities as tolerated for healthy weight management.  - Discussed Mediterranean Diet recommendations (Adopted from Melissa et al, [facility], 2018.)  - Eat primarily plant-based foods, such as fruits/vegetables, whole grains, legumes & nuts  - Limit refined carbohydrates (white pasta, bread, rice).  - Replace butter with healthy fats such as olive oil.  - Use herbs and spices instead of salt to flavor foods.  - Limit red meat and processed meats to no more than a few times a month.  - Avoid sugary sodas, bakery goods, and sweets.  - Eat fish and poultry at least twice a week.              - Get plenty of exercise (150 minutes per week).     LIFESTYLE CHANGES:  - Elevate legs when sitting or lying down.  - Use compression stockings when moving around.  - Continue current diet and exercise regimen as tolerated.  - Sleep in a reclined position if lying flat is uncomfortable.            Ilana Pugh, FNP-C [facility] - Cardiology.      Total professional time spent for the encounter: 40 minutes  Time was spent preparing to see the patient, reviewing results of prior testing, obtaining and/or reviewing separately obtained history, performing a medically appropriate examination and interview, counseling and educating the patient/family, ordering medications/tests/procedures, referring and communicating with other health care professionals, documenting clinical information in the electronic health record, and independently interpreting results.    This note was generated with the assistance of ambient listening technology. Verbal consent was obtained by the patient and accompanying visitor(s) for  the recording of patient appointment to facilitate this note. I attest to having reviewed and edited the generated note for accuracy, though some syntax or spelling errors may persist. Please contact the author of this note for any clarification.           [1]   Patient Active Problem List  Diagnosis    Hypertension    Genital herpes in women    GERD (gastroesophageal reflux disease)    Vitamin D deficiency    Other chest pain    Elevated troponin    Abnormal cardiovascular stress test    Atherosclerosis of native coronary artery of native heart    Pre-op testing    Transient hyperglycemia post procedure    Hx of CABG    Acute blood loss anemia    Hypophosphatemia    Pleural effusion   [2]   Current Outpatient Medications   Medication Sig Dispense Refill    amLODIPine (NORVASC) 5 MG tablet Take 1 tablet (5 mg total) by mouth once daily. 30 tablet 3    aspirin (ECOTRIN) 81 MG EC tablet Take 1 tablet (81 mg total) by mouth once daily. 90 tablet 3    atorvastatin (LIPITOR) 40 MG tablet Take 1 tablet (40 mg total) by mouth every evening. 90 tablet 3    furosemide (LASIX) 40 MG tablet Take 1 tablet (40 mg total) by mouth once daily. 30 tablet 0    metoprolol tartrate (LOPRESSOR) 25 MG tablet Take 1 tablet (25 mg total) by mouth 2 (two) times daily. 60 tablet 11    omeprazole (PRILOSEC) 40 MG capsule Take 1 capsule (40 mg total) by mouth once daily. 90 capsule 3    oxyCODONE (OXY-IR) 5 mg Cap Take 5 mg by mouth every 4 (four) hours as needed.      valACYclovir (VALTREX) 1000 MG tablet Take 1 tablet (1,000 mg total) by mouth every 12 (twelve) hours. Begin taking at first sign of outbreak 14 tablet 5     No current facility-administered medications for this visit.   [3]   Social History  Socioeconomic History    Marital status:    Tobacco Use    Smoking status: Never    Smokeless tobacco: Never   Substance and Sexual Activity    Alcohol use: Not Currently     Alcohol/week: 1.0 standard drink of alcohol     Types: 1  Glasses of wine per week     Comment: This is Sometimes with guest or family every two months    Drug use: No    Sexual activity: Yes     Partners: Male     Birth control/protection: None     Social Drivers of Health     Financial Resource Strain: Low Risk  (2/25/2025)    Overall Financial Resource Strain (CARDIA)     Difficulty of Paying Living Expenses: Not hard at all   Recent Concern: Financial Resource Strain - High Risk (1/15/2025)    Overall Financial Resource Strain (CARDIA)     Difficulty of Paying Living Expenses: Hard   Food Insecurity: No Food Insecurity (2/25/2025)    Hunger Vital Sign     Worried About Running Out of Food in the Last Year: Never true     Ran Out of Food in the Last Year: Never true   Recent Concern: Food Insecurity - Food Insecurity Present (1/15/2025)    Hunger Vital Sign     Worried About Running Out of Food in the Last Year: Sometimes true     Ran Out of Food in the Last Year: Often true   Transportation Needs: Patient Declined (10/28/2024)    TRANSPORTATION NEEDS     Transportation : Patient declined   Physical Activity: Sufficiently Active (2/25/2025)    Exercise Vital Sign     Days of Exercise per Week: 7 days     Minutes of Exercise per Session: 60 min   Recent Concern: Physical Activity - Insufficiently Active (1/15/2025)    Exercise Vital Sign     Days of Exercise per Week: 7 days     Minutes of Exercise per Session: 10 min   Stress: No Stress Concern Present (2/25/2025)    [employer] - Occupational Stress Questionnaire     Feeling of Stress : Not at all   Recent Concern: Stress - Stress Concern Present (1/15/2025)    [employer] - Occupational Stress Questionnaire     Feeling of Stress : To some extent   Housing Stability: Low Risk  (2/25/2025)    Housing Stability Vital Sign     Unable to Pay for Housing in the Last Year: No     Homeless in the Last Year: No

## 2025-05-15 ENCOUNTER — TELEPHONE (OUTPATIENT)
Dept: PRIMARY CARE CLINIC | Facility: CLINIC | Age: 55
End: 2025-05-15
Payer: COMMERCIAL

## 2025-05-15 ENCOUNTER — DOCUMENTATION ONLY (OUTPATIENT)
Dept: CARDIAC REHAB | Facility: CLINIC | Age: 55
End: 2025-05-15
Payer: COMMERCIAL

## 2025-05-15 NOTE — TELEPHONE ENCOUNTER
----- Message from Liv sent at 5/15/2025  8:57 AM CDT -----  Contact: [phone] Patient  .[email or web address] Patient is calling for Medical Advice regarding:Good Morning, Patient is calling to schedule a follow up. Patient had surgery but she want to schedule with nurseHow long has patient had these symptoms:Pharmacy name and phone#:Patient wants a call back or [facility]:call and advise Comments:Please advise patient replies from provider may take up to 48 hours.

## 2025-05-15 NOTE — PROGRESS NOTES
Pat has completed 4 out of 36 exercise session of Phase II cardiac rehab.  A follow up reassessment will be completed at 12 sessions.  Pat is currently on hold due to a medical issue.    Session: Orientation     Cardiac Rehab Individual Treatment Plan - Initial Assessment      Patient Name: Pat Yung MRN: [id number]   : 1970   Age: 54 y.o.   Primary Diagnosis: CABG  Date of Event: 25  EF: 60-65%  Risk Stratification: high  Referring Physician:    Exercise Assessment:     CPX/TM Date: 25 Results   RHR 49   Max    Peak VO2 (CPX only) 10.5   Actual METS (CPX only) 3.0   Estimated METS 6.0     Anthropometrics    Height 63 inches   Weight 209 lbs   BMI 37.0   Abdominal Girth 45.5   Body Composition 29.5%     ST Depression noted on Stress Test?:No  Angina with exercise?: No   Fall Risk: Yes   Assistive Devices:  independent   Currently exercising? Some walking around the inside of her home  Pat stated there were no limitations to exercise but had a fall in her past that can cause her to have some left side lower back pain.     Exercise Plan:   Goals:  CR Exercise Goals: Attend Cardiac Rehab 3 times/week: In Progress  Home Aerobic Exercise: 2 additional days/week for 30-60 minutes: In Progress  Intensity of 12-15 on the Rate of Perceived Exertion (RPE) scale: In Progress  30% increase in entry estimated METS: 7.8 : In Progress  5 days/week for 30-60 minutes: In Progress  Demonstrate proper pulse taking technique: In Progress    Intervention:   Discussed importance of regular attendance to cardiac rehab class    Exercise Prescription:  THR Range 81-94   Mode: Treadmill  Recumbent Bike  Upright Bike  Nustep  Elliptical   Frequency:  3 days/week   Duration:  30 - 60 minutes   Intensity:  12 - 15 RPE   Resistance Training:  Yes: 3 to 5 lb weights with 10-15 reps based on strength and range of motion assessment     Home Prescription:  Mode Aerobic   Frequency: 2- 3 days/week   Duration:  "30-60 minutes   Resistance Training: None        Education:  Orientation to Equipment; verbalizes understanding; Date: 25  Exercise Recommendations; verbalizes understanding; Date: 25  Exercise Safety; verbalizes understanding; Date: 25  Class Preparation: verbalizes understanding; Date: 25  Signs and symptoms to report: verbalizes understanding; Date: 25  Caffeine/Hydration: verbalizes understanding; Date: 25  Exercise Terminology: verbalizes understanding; Date: 25  Resistance Training: verbalizes understanding; Date: 25    Comments:  I encouraged Pat to begin thinking about some type of aerobic exercise she can participate in at least 2 non-rehab days per week for at least 30 minutes in addition to attending Phase II cardiac rehab classes 3 days per week.  She stated understanding.    All consent forms were signed, proper attire and shoes were discussed.       Pat will begin Cardiac Rehab on  at 9:00 am.    The exercise prescription will be adjusted based on tolerance of exercise intensity by patient.    Lopez Egan, CEP    Orientation   Cardiac Rehab Individual Treatment Plan - Initial Assessment      Patient Name: Pat Yung MRN: [id number]   : 1970   Age: 54 y.o.   Primary Diagnosis: CABG, HTN, CAD    Nutrition Assessment:     Anthropometrics    Height 63 inches   Weight 209 lbs   BMI 37   Abdominal Girth 45.5   Body Composition 29.5       Drug Allergies and Intolerances:  Review of patient's allergies indicates:   Allergen Reactions    Nexium [esomeprazole magnesium] Hives     Patient says that began having "break-outs" on her abdomen    Lisinopril Other (See Comments)     Cough        Food Allergies and Intolerances:  NA    Past Medical History:  Past Medical History:   Diagnosis Date    GERD (gastroesophageal reflux disease)     Herpes simplex without mention of complication     Hyperlipidemia     Hypertension     Ulcer     " Vitamin D deficiency        Past Surgical History:  Past Surgical History:   Procedure Laterality Date    ANGIOGRAM, CORONARY ARTERY - Right Right 2025     SECTION  1998    x 1    COLONOSCOPY N/A 2021    Procedure: COLONOSCOPY;  Surgeon: Jesu Canas MD;  Location: [facility] ENDO;  Service: Endoscopy;  Laterality: N/A;  covid  stbernard -ml    CORONARY ANGIOGRAPHY Right 2025    Procedure: ANGIOGRAM, CORONARY ARTERY;  Surgeon: Mauro Delaney MD;  Location: [facility] CATH LAB;  Service: Cardiology;  Laterality: Right;    CORONARY ARTERY BYPASS GRAFT (CABG) N/A 2025    Procedure: CORONARY ARTERY BYPASS GRAFT x1 (CABG);  Surgeon: Josef Nugetn MD;  Location: [address];  Service: Cardiothoracic;  Laterality: N/A;  LIMA to LAD    ESOPHAGOGASTRODUODENOSCOPY  2022    ESOPHAGOGASTRODUODENOSCOPY N/A 2022    Procedure: EGD (ESOPHAGOGASTRODUODENOSCOPY);  Surgeon: Bhupendra Delacruz MD;  Location: [facility] ENDO;  Service: Endoscopy;  Laterality: N/A;    TUBAL LIGATION         Medications:  Current Outpatient Medications   Medication Sig    amLODIPine (NORVASC) 5 MG tablet Take 1 tablet (5 mg total) by mouth once daily.    aspirin (ECOTRIN) 81 MG EC tablet Take 1 tablet (81 mg total) by mouth once daily.    atorvastatin (LIPITOR) 40 MG tablet Take 1 tablet (40 mg total) by mouth every evening.    furosemide (LASIX) 40 MG tablet Take 1 tablet (40 mg total) by mouth once daily.    metoprolol tartrate (LOPRESSOR) 25 MG tablet Take 1 tablet (25 mg total) by mouth 2 (two) times daily.    omeprazole (PRILOSEC) 40 MG capsule Take 1 capsule (40 mg total) by mouth once daily.    oxyCODONE (OXY-IR) 5 mg Cap Take 5 mg by mouth every 4 (four) hours as needed.    valACYclovir (VALTREX) 1000 MG tablet Take 1 tablet (1,000 mg total) by mouth every 12 (twelve) hours. Begin taking at first sign of outbreak     No current facility-administered medications for this visit.       Vitamins and  "Supplements:  NA    Labs:  Patient confirms she is taking lipitor 40mg for cholesterol control.    Lab Results   Component Value Date    CHOL 129 04/16/2025     Lab Results   Component Value Date    HDL 46 04/16/2025     No results found for: "LDL"    Lab Results   Component Value Date    TRIG 57 04/16/2025     Lab Results   Component Value Date    CHOLHDL 35.7 04/16/2025         Lab Results   Component Value Date    GLUCFAST 80 04/16/2025     Lab Results   Component Value Date    HGBA1C 5.6 02/20/2025       Nutrition/Diet History:  Patient eats 3 meals daily and 1-2 snacks daily.    Seasons food with Salt free seasonings.  Patient denies use of a salt shaker at the table on prepared foods.   Dines out 1-2 per month  Chooses fried foods stopped    Chooses fish 4-5 time(s) per week.   Beverages:  water and unsweet tea  Alcohol: none    24 Hour Recall:  Breakfast: 1 pancake  Lunch:baked chicken brussels sprouts  Dinner: salad with vinaigrette  Other: NA    Difficulty Chewing or Swallowing: No  Current Exercise: See Exercise Physiologist Note  Food Safety/Food Preparation: spouse  Living Arrangements/Family Support: Lives with spouse  Cultural/Spiritual/Personal Preferences: not applicable   Barriers to Education: none identified  Stage of Change Related to Diet Habits: Action    Nutrition Diagnosis:  Food and nutrition related knowledge deficit related to the lack of prior nutrition education as evidenced by diet history and 24 hour recall    Nutrition Plan:   Goals:  LDL-C < 70 (for high risk patients)  Hgb A1c < 7%  BMI < 25 and abdominal girth < 40M/<35 F  2 gram sodium, Mediterranean diet  Rehab weight goal: -10  Fish intake (non-fried varieties) to a goal of 2-3 servings per week.   Increase fruit and vegetable intake    Interventions/Recommendations:  Lab results reviewed and discussed  Nutrition Prescription:  Total Energy Estimated Needs: 3348-8448 Kcal/d for weight loss  Method for Estimating Needs: " 20-25kcal/kg ABW  Total Protein Estimated Needs: 63-82 g/d  Method for Estimating Needs: 1-1.3g/Kg ABW  Total Fluid Estimated Needs: 1 mL/Kcal  Dietitian Consult: No  Patient to participate in Cardiac Rehab sessions three times a week  Weekly Dietitian Weight Check  Encouraged patient to complete 3 day food diary  Follow Up Plan for Ongoing Self-Management Support    Education:  Mediterranean Diet; verbalizes understanding; Date: 25  Person taught: patient  Preferred Learning Method: Verbal, Written  Education Needed/Provided: Nutrition counseling and education related to cardiac rehabilitation  Education Method: Weekly nutrition lectures on the Mediterranean diet, cooking, shopping, and dining out  Written Materials Provided: 3 Day Food Record, Introduction to Mediterranean Diet  Strategies Implemented: Motivational interviewing, Goal setting, Self-Monitoring, and Problem Solving    Comments:   Discussed ways to incorporate healthy snacks, eating on a schedule, and monitoring sodium intake for heart health.  Pt has made significant changes since event-she has stopped eating any fried foods as well as reading labels to limit sodium and increase fiber. Cooking primarily at home with whole food ingredients and salmon as her primary protein intake. She has also begun to be much more mindful of fruit/veg intake. She is very motivated and has great family support    Diabetes  Is the patient diabetic? No      RD contact information provided.      Becca Jones MS, RDN/LDN    Session: Orientation   Cardiac Rehab Individual Treatment Plan - Initial Assessment      Patient Name: Pat Yung MRN: [id number]   : 1970   Age: 54 y.o.   Date of Event: 2025   Primary Diagnosis: S/P CABG    EF: 60-65%    Physical Assessment:   LMP 2023 (Exact Date)     ASSESSMENT:  Heart Sounds: regular rate and rhythm  Prosthetic Valve: No  Lung Sounds: normal air entry, lungs clear to auscultation  Capillary Refill:  normal  Left Radial Pulse: Normal (+2)  Right Radial Pulse: Normal (+2)  Left Pedal Pulse: Normal (+2)  Right Pedal Pulse: Normal (+2)  Right Edema: Trace  Left Edema 1+  Strength: good  Range of Motion: full range of motion  Existing Limitations:     Site   Arthritis, bursitis    Amputation, atrophy    Other: C/O soreness in chest with arm movement, advised pt to wear soft supportive bra         Diabetic patient's foot examination comments: None -  Neither  Incisional site: healing well  Special needs: N/A    Psychosocial Assessment:   Outcome Survey Tools:    RADHIKA SCORES:               SF-36             PHQ-9:      4/24/2025     7:40 AM   PHQ-9 Depression Patient Health Questionnaire   Over the last two weeks how often have you been bothered by little interest or pleasure in doing things 2   Over the last two weeks how often have you been bothered by feeling down, depressed or hopeless 0   Over the last two weeks how often have you been bothered by trouble falling or staying asleep, or sleeping too much 0   Over the last two weeks how often have you been bothered by feeling tired or having little energy 2   Over the last two weeks how often have you been bothered by a poor appetite or overeating 0   Over the last two weeks how often have you been bothered by feeling bad about yourself - or that you are a failure or have let yourself or your family down 0   Over the last two weeks how often have you been bothered by trouble concentrating on things, such as reading the newspaper or watching television 0   Over the last two weeks how often have you been bothered by moving or speaking so slowly that other people could have noticed. 0   Over the last two weeks how often have you been bothered by thoughts that you would be better off dead, or of hurting yourself 0   If you checked off any problems, how difficult have these problems made it for you to do your work, take care of things at home or get along with other  people? Somewhat difficult   PHQ-9 Score 4              Living Arrangements: Lives with spouse  Family Support: children, grandchildren, and spouse  Self Reported: Effective Coping Skills  Displays: happiness and calmness  Medication: not applicable    Psychosocial Plan:   Goals:  Improved psychosocial coping strategies  Maintain positive support system  Maintain positive outlook  Improve overall quality of life    Interventions/Recommendations:  Discussed Results of Surveys  Patient to Self Report Emotional Changes at Session Check In  Recommend Physical Activity  Recommend Attending Education Lectures  Notify MD: Declined  Program Referral: Declined  Pharmaceutical Intervention/Therapy: Declined  Other Needs: not applicable  Stage of Readiness to Change: Preparation    Education:  Stress; verbalizes understanding; Date: 4/24/2025    Comments:  Pt denies any overwhelming stress or anxiety. Admits that she is not feeling upbeat yet due to physical deconditioning.  Patient has been instructed to notify staff in the event that circumstances worsen.  Patient verbalizes understanding.    Other Core Components/Risk Factors Assessment:   RISK FACTORS:  hyperlipidemia, hypertension, obesity, positive family history    Learning Barriers: None    Education Level:  Attended College/Technical School    Pre-test Score: 50    Medication Compliance: has been compliant with taking medications    Other Core Components/Risk Factors Plan:   Goals:  Decrease cholesterol level: Met  Increase exercise tolerance: In Progress  Increase knowledge of CAD: In Progress  Decrease blood pressure: Met  Weight loss: In Progress  Learn more about healthy eating: In Progress    Interventions/Recommendations:  Recommend regular attendance for Cardiac Rehab: Exercise and Education Lectures  Encourage medication compliance  Individual Education/ Counseling: Yes  Physician Referral: No    Education:    risk factors, verbalizes understanding; Date:  4/24/2025        Education method adapted to patients education level and preferred method of learning.  Method: explanation    Comments:  Pt is monitoring her sodium intake, eating less, walking for exercise and very motivated to continue managing her risk factors for heart disease.    Other Core Components/Hypertension Assessment:   Resting BP: 122/80  BP Readings from Last 1 Encounters:   05/08/25 118/62         BP Diagnosis: Hypertensive  Patient reported symptoms: tiredness/fatigue    Medications:  Medication Prescribed? Adherent? Exception   Beta-blocker [x]Yes  []No  []Unknown [x]Yes  []No  []Unknown    ACEI/ARB []Yes  [x]No  []Unknown []Yes  []No  []Unknown    Calcium Channel Blocker [x]Yes  []No  []Unknown [x]Yes  []No  []Unknown    Diuretic []Yes  [x]No  []Unknown []Yes  []No  []Unknown        Other Core Components/Hypertension Plan:   Goals:  Blood Pressure <130/80    Interventions/Recommendations:  Med Card Reconciled: Yes  Encourage medication compliance  Encourage sodium reduction  Reduce alcohol consumption  Encourage weight loss  Recommend physical activity  Educate on contributory factors  Reduce stress, anxiety, anger, depression, and/or chronic pain  Encourage home blood pressure monitoring  Recommend daily weights  MD notified/Physician Referral: No    Education:    Hypertension; verbalizes understanding; Date: 4/24/2025  Risk Factors; verbalizes understanding; Date: 4/24/2025        Comments:  Pt will be monitoring her blood pressure at home keeping a log for her providers. Pt will continue reducing her sodium intake and walking regularly for exercise.      Does the patient have Heart Failure? No    Other Core Components/Tobacco Cessation Assessment:   Smoking Status: Lifetime Non-smoker  Primary Tobacco Type: N/A  Tobacco Usage: no  Smoking Cessation Barriers: N/A  Stage of Readiness to Change: Maintenance    Other Core Components/Tobacco Cessation Plan:   Goals:  Maintain non-smoking  status    Interventions:  Maintains non-smoking status    Education:    Coronary Artery Disease; verbalizes understanding; Date: 4/24/2025        Comments:  Pt never uses tobacco products.    Discussed Cardiac Rehab program in depth with patient.  Medication list updated per patient & marked as reviewed.  Patient has been instructed to notify staff of any problems while attending rehab (ie: chest pain, shortness of breath, lightheadedness, dizziness).  Patient has been instructed to monitor blood pressure readings outside of rehab & to keep a daily log of the readings.  Patient verbalizes understanding.    Pal Brock RN

## 2025-05-16 ENCOUNTER — OFFICE VISIT (OUTPATIENT)
Dept: PRIMARY CARE CLINIC | Facility: CLINIC | Age: 55
End: 2025-05-16
Payer: COMMERCIAL

## 2025-05-16 ENCOUNTER — TELEPHONE (OUTPATIENT)
Dept: CARDIOTHORACIC SURGERY | Facility: CLINIC | Age: 55
End: 2025-05-16
Payer: COMMERCIAL

## 2025-05-16 VITALS
HEIGHT: 63 IN | OXYGEN SATURATION: 98 % | RESPIRATION RATE: 18 BRPM | HEART RATE: 56 BPM | BODY MASS INDEX: 34.25 KG/M2 | SYSTOLIC BLOOD PRESSURE: 122 MMHG | DIASTOLIC BLOOD PRESSURE: 70 MMHG | WEIGHT: 193.31 LBS

## 2025-05-16 DIAGNOSIS — Z95.1 HX OF CABG: ICD-10-CM

## 2025-05-16 DIAGNOSIS — R07.9 CHEST PAIN, UNSPECIFIED TYPE: Primary | ICD-10-CM

## 2025-05-16 PROCEDURE — 99999 PR PBB SHADOW E&M-EST. PATIENT-LVL III: CPT | Mod: PBBFAC,,, | Performed by: STUDENT IN AN ORGANIZED HEALTH CARE EDUCATION/TRAINING PROGRAM

## 2025-05-16 NOTE — TELEPHONE ENCOUNTER
Returned call to pt and provided fax and email for [facility] per her request. Pt verbalized understanding of contact information.       ----- Message from Sloane sent at 5/16/2025 10:18 AM CDT -----  Regarding: Disability Documentation Needed  Contact: Pat  CONSULT/ADVISORYName of Caller: Roxanne Preference:  [phone] of Call: Pt states that she needs to speak with a nurse about receiving her disability documentation. Would like a call back in order to further discuss

## 2025-05-16 NOTE — PROGRESS NOTES
"Subjective:       Patient ID: Pat Yung is a 54 y.o. female.    Chief Complaint: Follow-up      HPI:  54 y.o. female presents to [facility] here for follow-up visit    Acute concerns?: No acute concerns. Chest pain is better since last seen. Is having some pain, but healing. Cardiac rehab was held with pleural effusion presence.    Had CABG 2/24/2025. Saw Ilana Pugh NP 5/8/2025.    Review of Systems   Constitutional:  Negative for chills, diaphoresis, fatigue and fever.   HENT:  Negative for congestion, sinus pressure, sneezing and sore throat.    Respiratory:  Negative for cough and shortness of breath.    Cardiovascular:  Positive for chest pain (Mild, improving since CABG) and palpitations (Can feel at times will feel abnormal. Rest will alleviate.).   Gastrointestinal:  Negative for abdominal pain, diarrhea, nausea and vomiting.   Endocrine: Positive for cold intolerance (Has been going on some time. Just from time to time). Negative for heat intolerance.   Skin:  Negative for rash and wound.   Neurological:  Negative for dizziness and light-headedness.       Objective:      Vitals:    05/16/25 0928   BP: 122/70   BP Location: Right arm   Patient Position: Sitting   Pulse: (!) 56   Resp: 18   SpO2: 98%   Weight: 87.7 kg (193 lb 5.5 oz)   Height: 5' 3" (1.6 m)     Physical Exam  Vitals reviewed.   Constitutional:       General: She is not in acute distress.     Appearance: Normal appearance. She is not ill-appearing.   HENT:      Head: Normocephalic and atraumatic.   Eyes:      General:         Right eye: No discharge.         Left eye: No discharge.      Conjunctiva/sclera: Conjunctivae normal.   Cardiovascular:      Rate and Rhythm: Normal rate and regular rhythm.      Pulses: Normal pulses.      Heart sounds: No murmur heard.  Pulmonary:      Effort: Pulmonary effort is normal.      Breath sounds: Normal breath sounds.   Musculoskeletal:         General: No deformity.      Cervical back: Neck " supple. No rigidity.   Lymphadenopathy:      Cervical: No cervical adenopathy.   Skin:     General: Skin is warm and dry.      Coloration: Skin is not jaundiced.   Neurological:      General: No focal deficit present.      Mental Status: She is alert and oriented to person, place, and time.   Psychiatric:         Mood and Affect: Mood normal.         Behavior: Behavior normal.             Lab Results   Component Value Date     05/05/2025     02/28/2025    K 3.8 05/05/2025    K 4.1 02/28/2025     05/05/2025     02/28/2025    CO2 26 05/05/2025    CO2 21 (L) 02/28/2025    BUN 8 05/05/2025    CREATININE 0.6 05/05/2025    ANIONGAP 7 (L) 05/05/2025     Lab Results   Component Value Date    HGBA1C 5.6 02/20/2025     Lab Results   Component Value Date    BNP 58 05/05/2025    BNP 14 10/28/2024    BNP <10 01/14/2024       Lab Results   Component Value Date    WBC 9.77 05/05/2025    HGB 12.5 05/05/2025    HGB 8.4 (L) 02/28/2025    HCT 39.4 05/05/2025    HCT 26.5 (L) 02/28/2025    HCT 30 (L) 02/24/2025     (H) 05/05/2025     02/28/2025    GRAN 7.4 02/28/2025    GRAN 58.8 02/28/2025     Lab Results   Component Value Date    CHOL 129 04/16/2025    CHOL 251 (H) 10/29/2024    HDL 46 04/16/2025    LDLCALC 71.6 04/16/2025    TRIG 57 04/16/2025    TRIG 132 10/29/2024        Current Medications[1]        Assessment:       1. Chest pain, unspecified type    2. Hx of CABG           Plan:       Chest pain, unspecified type  Hx of CABG  - Pain has improved since last seen. Now only residual soreness to upper chest following procedure. Continuing to improve.    Jero Weinberg MD       [1]   Current Outpatient Medications:     amLODIPine (NORVASC) 5 MG tablet, Take 1 tablet (5 mg total) by mouth once daily., Disp: 30 tablet, Rfl: 3    aspirin (ECOTRIN) 81 MG EC tablet, Take 1 tablet (81 mg total) by mouth once daily., Disp: 90 tablet, Rfl: 3    atorvastatin (LIPITOR) 40 MG tablet, Take 1 tablet (40 mg  total) by mouth every evening., Disp: 90 tablet, Rfl: 3    furosemide (LASIX) 40 MG tablet, Take 1 tablet (40 mg total) by mouth once daily., Disp: 30 tablet, Rfl: 0    metoprolol tartrate (LOPRESSOR) 25 MG tablet, Take 1 tablet (25 mg total) by mouth 2 (two) times daily., Disp: 60 tablet, Rfl: 11    omeprazole (PRILOSEC) 40 MG capsule, Take 1 capsule (40 mg total) by mouth once daily., Disp: 90 capsule, Rfl: 3    oxyCODONE (OXY-IR) 5 mg Cap, Take 5 mg by mouth every 4 (four) hours as needed., Disp: , Rfl:     valACYclovir (VALTREX) 1000 MG tablet, Take 1 tablet (1,000 mg total) by mouth every 12 (twelve) hours. Begin taking at first sign of outbreak, Disp: 14 tablet, Rfl: 5

## 2025-05-21 ENCOUNTER — OFFICE VISIT (OUTPATIENT)
Dept: PULMONOLOGY | Facility: CLINIC | Age: 55
End: 2025-05-21
Payer: COMMERCIAL

## 2025-05-21 VITALS
DIASTOLIC BLOOD PRESSURE: 68 MMHG | SYSTOLIC BLOOD PRESSURE: 106 MMHG | OXYGEN SATURATION: 98 % | WEIGHT: 200.81 LBS | BODY MASS INDEX: 35.58 KG/M2 | HEART RATE: 54 BPM

## 2025-05-21 DIAGNOSIS — E66.2 CLASS 2 OBESITY WITH ALVEOLAR HYPOVENTILATION, SERIOUS COMORBIDITY, AND BODY MASS INDEX (BMI) OF 35.0 TO 35.9 IN ADULT: ICD-10-CM

## 2025-05-21 DIAGNOSIS — E66.812 CLASS 2 OBESITY WITH ALVEOLAR HYPOVENTILATION, SERIOUS COMORBIDITY, AND BODY MASS INDEX (BMI) OF 35.0 TO 35.9 IN ADULT: ICD-10-CM

## 2025-05-21 DIAGNOSIS — J90 PLEURAL EFFUSION: ICD-10-CM

## 2025-05-21 DIAGNOSIS — R93.89 ABNORMAL CHEST X-RAY: Primary | ICD-10-CM

## 2025-05-21 PROCEDURE — 4010F ACE/ARB THERAPY RXD/TAKEN: CPT | Mod: CPTII,S$GLB,, | Performed by: INTERNAL MEDICINE

## 2025-05-21 PROCEDURE — 3008F BODY MASS INDEX DOCD: CPT | Mod: CPTII,S$GLB,, | Performed by: INTERNAL MEDICINE

## 2025-05-21 PROCEDURE — 99999 PR PBB SHADOW E&M-EST. PATIENT-LVL IV: CPT | Mod: PBBFAC,,, | Performed by: INTERNAL MEDICINE

## 2025-05-21 PROCEDURE — 3078F DIAST BP <80 MM HG: CPT | Mod: CPTII,S$GLB,, | Performed by: INTERNAL MEDICINE

## 2025-05-21 PROCEDURE — 1159F MED LIST DOCD IN RCRD: CPT | Mod: CPTII,S$GLB,, | Performed by: INTERNAL MEDICINE

## 2025-05-21 PROCEDURE — 3044F HG A1C LEVEL LT 7.0%: CPT | Mod: CPTII,S$GLB,, | Performed by: INTERNAL MEDICINE

## 2025-05-21 PROCEDURE — 3074F SYST BP LT 130 MM HG: CPT | Mod: CPTII,S$GLB,, | Performed by: INTERNAL MEDICINE

## 2025-05-21 PROCEDURE — 99204 OFFICE O/P NEW MOD 45 MIN: CPT | Mod: S$GLB,,, | Performed by: INTERNAL MEDICINE

## 2025-05-21 NOTE — PROGRESS NOTES
"History & Physical  [facility]    SUBJECTIVE:     Chief Complaint:   Pleural Effusion    History of Present Illness:  Pat Yung is a 54 y.o. female who presents for evaluation of a pleural effusion. She recently had a CABG. She is not bothered by shortness of breath. No fevers. No weight loss.     A CXR was done two weeks ago which was read as having a left pleural effusion. The CXR was done for evaluation in the ED for complaint of cough. The pt was prescribed lasix. Pt states that she is not bothered by cough today.    She is a lifetime non-smoker. No childhood asthma.    PMH: htn    Review of patient's allergies indicates:   Allergen Reactions    Nexium [esomeprazole magnesium] Hives     Patient says that began having "break-outs" on her abdomen    Lisinopril Other (See Comments)     Cough        Past Medical History:   Diagnosis Date    GERD (gastroesophageal reflux disease)     Herpes simplex without mention of complication     Hyperlipidemia     Hypertension     Ulcer     Vitamin D deficiency      Past Surgical History:   Procedure Laterality Date    ANGIOGRAM, CORONARY ARTERY - Right Right 2025     SECTION  1998    x 1    COLONOSCOPY N/A 2021    Procedure: COLONOSCOPY;  Surgeon: Jesu Canas MD;  Location: [facility] ENDO;  Service: Endoscopy;  Laterality: N/A;  covid  stbernard -ml    CORONARY ANGIOGRAPHY Right 2025    Procedure: ANGIOGRAM, CORONARY ARTERY;  Surgeon: Mauro Delaney MD;  Location: [facility] CATH LAB;  Service: Cardiology;  Laterality: Right;    CORONARY ARTERY BYPASS GRAFT (CABG) N/A 2025    Procedure: CORONARY ARTERY BYPASS GRAFT x1 (CABG);  Surgeon: Josef Nugent MD;  Location: [facility] OR [address] [place] [address];  Service: Cardiothoracic;  Laterality: N/A;  LIMA to LAD    ESOPHAGOGASTRODUODENOSCOPY  2022    ESOPHAGOGASTRODUODENOSCOPY N/A 2022    Procedure: EGD (ESOPHAGOGASTRODUODENOSCOPY);  Surgeon: Bhupendra Delacruz MD;  Location: [facility] " ENDO;  Service: Endoscopy;  Laterality: N/A;    TUBAL LIGATION  1998     Family History   Problem Relation Name Age of Onset    Diabetes Maternal Grandmother Grandma     Heart disease Maternal Grandmother Grandma     Kidney disease Maternal Grandmother Grandma     Stroke Maternal Grandmother Grandma     Vision loss Maternal Grandmother Grandma     COPD Maternal Grandfather Grandpa and Grandma     Heart disease Maternal Grandfather Grandpa and Grandma     Hyperlipidemia Maternal Grandfather Grandpa and Grandma     Hypertension Maternal Grandfather Grandpa and Grandma     Stroke Maternal Grandfather Grandpa and Grandma     Vision loss Paternal Grandmother Grandma     Sarcoidosis Mother      Vitiligo Mother      Ovarian cancer Neg Hx      Colon cancer Neg Hx      Breast cancer Neg Hx       Social History[1]  Review of Systems:  No pertinent positives    OBJECTIVE:     Vital Signs  Vitals:    05/21/25 1114   BP: 106/68   BP Location: Left arm   Patient Position: Sitting   Pulse: (!) 54   SpO2: 98%   Weight: 91.1 kg (200 lb 13.4 oz)     Body mass index is 35.58 kg/m².    Physical Exam:  General: no distress  Eyes:  conjunctivae/corneas clear  Nose: no discharge  Neck: trachea midline with no masses appreciated  Lungs:  normal respiratory effort, no wheezes, no rales  Heart: regular rate and rhythm and no murmur  Abdomen: non-distended  Extremities: no cyanosis, no edema, no clubbing  Skin: No rashes or lesions. good skin turgor  Neurologic: alert, oriented, thought content appropriate    Laboratory:  Lab Results   Component Value Date    WBC 9.77 05/05/2025    HGB 12.5 05/05/2025    HCT 39.4 05/05/2025    MCV 88 05/05/2025     (H) 05/05/2025     Chest Imaging, My Impression:   CXR 2/20/2025: normal  CXR 5/2025: left pleural effusion that is moderate in size. The effusion is larger than it was 3/2025.    Diagnostic Results:  PFT 2/2025: no obstruction, no restriction, DLCO is depressed    TTE 5/2025:  Left  Ventricle The left ventricle is normal in size. Normal wall thickness. Normal wall motion. There is normal systolic function with a visually estimated ejection fraction of 55 - 60%. There is normal diastolic function.   Right Ventricle The right ventricle is normal in size Wall thickness is normal. Systolic function is normal.   Left Atrium The left atrium is normal in size   Right Atrium The right atrium is normal in size.   Aortic Valve The aortic valve is a trileaflet valve. There is mild aortic valve sclerosis. There is normal leaflet mobility. Aortic valve peak velocity is 1.6 m/s. Mean gradient is 6 mmHg. There is no significant regurgitation.   Mitral Valve The mitral valve is structurally normal. There is normal leaflet mobility. The mean pressure gradient across the mitral valve is 2 mmHg at a heart rate of  bpm. There is mild regurgitation.   Tricuspid Valve The tricuspid valve is structurally normal. There is normal leaflet mobility. There is mild regurgitation.   Pulmonic Valve The pulmonic valve is structurally normal. There is normal leaflet mobility. There is no significant regurgitation.   IVC/SVC Normal venous pressure at 3 mmHg.   Ascending Aorta The aortic root is normal in size. The ascending aorta is normal in size measuring 2.4 cm.   Pericardium and Other Findings There is no pericardial effusion.   Pulmonary Artery The estimated pulmonary artery systolic pressure is 30 mmHg.       ASSESSMENT/PLAN:     Pleural Effusion, Abnormal chest xray  - I am not able to appreciate any pleural effusion on ultrasound exam today. It is not clear why her CXR was abnormal. Recommend chest CT now to further evaluate. I will call the pt to discuss the results once available.    Obesity with alveolar hypoventilation  - Recommend weight loss.    Pantera Lackey MD  [facility]           [1]   Social History  Socioeconomic History    Marital status:    Tobacco Use    Smoking status: Never     Smokeless tobacco: Never   Substance and Sexual Activity    Alcohol use: Not Currently     Alcohol/week: 1.0 standard drink of alcohol     Types: 1 Glasses of wine per week     Comment: This is Sometimes with guest or family every two months    Drug use: No    Sexual activity: Yes     Partners: Male     Birth control/protection: None     Social Drivers of Health     Financial Resource Strain: Low Risk  (2/25/2025)    Overall Financial Resource Strain (CARDIA)     Difficulty of Paying Living Expenses: Not hard at all   Recent Concern: Financial Resource Strain - High Risk (1/15/2025)    Overall Financial Resource Strain (CARDIA)     Difficulty of Paying Living Expenses: Hard   Food Insecurity: No Food Insecurity (2/25/2025)    Hunger Vital Sign     Worried About Running Out of Food in the Last Year: Never true     Ran Out of Food in the Last Year: Never true   Recent Concern: Food Insecurity - Food Insecurity Present (1/15/2025)    Hunger Vital Sign     Worried About Running Out of Food in the Last Year: Sometimes true     Ran Out of Food in the Last Year: Often true   Transportation Needs: Patient Declined (10/28/2024)    TRANSPORTATION NEEDS     Transportation : Patient declined   Physical Activity: Sufficiently Active (2/25/2025)    Exercise Vital Sign     Days of Exercise per Week: 7 days     Minutes of Exercise per Session: 60 min   Recent Concern: Physical Activity - Insufficiently Active (1/15/2025)    Exercise Vital Sign     Days of Exercise per Week: 7 days     Minutes of Exercise per Session: 10 min   Stress: No Stress Concern Present (2/25/2025)    [employer] [place] [employer] - Occupational Stress Questionnaire     Feeling of Stress : Not at all   Recent Concern: Stress - Stress Concern Present (1/15/2025)    [employer] [place] [employer] - Occupational Stress Questionnaire     Feeling of Stress : To some extent   Housing Stability: Low Risk  (2/25/2025)    Housing Stability Vital  Sign     Unable to Pay for Housing in the Last Year: No     Homeless in the Last Year: No

## 2025-06-02 ENCOUNTER — TELEPHONE (OUTPATIENT)
Dept: PULMONOLOGY | Facility: CLINIC | Age: 55
End: 2025-06-02
Payer: COMMERCIAL

## 2025-06-05 ENCOUNTER — DOCUMENTATION ONLY (OUTPATIENT)
Dept: CARDIAC REHAB | Facility: CLINIC | Age: 55
End: 2025-06-05
Payer: COMMERCIAL

## 2025-06-05 ENCOUNTER — TELEPHONE (OUTPATIENT)
Dept: CARDIOTHORACIC SURGERY | Facility: CLINIC | Age: 55
End: 2025-06-05
Payer: COMMERCIAL

## 2025-06-09 ENCOUNTER — CLINICAL SUPPORT (OUTPATIENT)
Dept: CARDIAC REHAB | Facility: CLINIC | Age: 55
End: 2025-06-09
Payer: COMMERCIAL

## 2025-06-09 DIAGNOSIS — I25.10 CORONARY ATHEROSCLEROSIS OF NATIVE CORONARY ARTERY: ICD-10-CM

## 2025-06-09 DIAGNOSIS — Z95.1 POSTSURGICAL AORTOCORONARY BYPASS STATUS: Primary | ICD-10-CM

## 2025-06-09 PROCEDURE — 93798 PHYS/QHP OP CAR RHAB W/ECG: CPT | Mod: S$GLB,,, | Performed by: INTERNAL MEDICINE

## 2025-06-11 ENCOUNTER — CLINICAL SUPPORT (OUTPATIENT)
Dept: CARDIAC REHAB | Facility: CLINIC | Age: 55
End: 2025-06-11
Payer: COMMERCIAL

## 2025-06-11 DIAGNOSIS — Z95.1 POSTSURGICAL AORTOCORONARY BYPASS STATUS: Primary | ICD-10-CM

## 2025-06-11 DIAGNOSIS — I25.10 ATHEROSCLEROSIS OF NATIVE CORONARY ARTERY OF NATIVE HEART WITHOUT ANGINA PECTORIS: ICD-10-CM

## 2025-06-11 PROCEDURE — 93798 PHYS/QHP OP CAR RHAB W/ECG: CPT | Mod: S$GLB,,, | Performed by: INTERNAL MEDICINE

## 2025-06-13 ENCOUNTER — CLINICAL SUPPORT (OUTPATIENT)
Dept: CARDIAC REHAB | Facility: CLINIC | Age: 55
End: 2025-06-13
Payer: COMMERCIAL

## 2025-06-13 DIAGNOSIS — Z95.1 POSTSURGICAL AORTOCORONARY BYPASS STATUS: Primary | ICD-10-CM

## 2025-06-13 DIAGNOSIS — I25.10 ATHEROSCLEROSIS OF NATIVE CORONARY ARTERY OF NATIVE HEART, UNSPECIFIED WHETHER ANGINA PRESENT: ICD-10-CM

## 2025-06-16 ENCOUNTER — CLINICAL SUPPORT (OUTPATIENT)
Dept: CARDIAC REHAB | Facility: CLINIC | Age: 55
End: 2025-06-16
Payer: COMMERCIAL

## 2025-06-16 DIAGNOSIS — I25.10 ATHEROSCLEROSIS OF NATIVE CORONARY ARTERY OF NATIVE HEART, UNSPECIFIED WHETHER ANGINA PRESENT: ICD-10-CM

## 2025-06-16 DIAGNOSIS — Z95.1 POSTSURGICAL AORTOCORONARY BYPASS STATUS: Primary | ICD-10-CM

## 2025-06-16 PROCEDURE — 93798 PHYS/QHP OP CAR RHAB W/ECG: CPT | Mod: S$GLB,,, | Performed by: INTERNAL MEDICINE

## 2025-06-18 ENCOUNTER — CLINICAL SUPPORT (OUTPATIENT)
Dept: CARDIAC REHAB | Facility: CLINIC | Age: 55
End: 2025-06-18
Payer: COMMERCIAL

## 2025-06-18 DIAGNOSIS — I25.10 CORONARY ATHEROSCLEROSIS OF NATIVE CORONARY ARTERY: ICD-10-CM

## 2025-06-18 DIAGNOSIS — Z95.1 POSTSURGICAL AORTOCORONARY BYPASS STATUS: Primary | ICD-10-CM

## 2025-06-18 PROCEDURE — 93798 PHYS/QHP OP CAR RHAB W/ECG: CPT | Mod: S$GLB,,, | Performed by: INTERNAL MEDICINE

## 2025-06-20 ENCOUNTER — CLINICAL SUPPORT (OUTPATIENT)
Dept: CARDIAC REHAB | Facility: CLINIC | Age: 55
End: 2025-06-20
Payer: COMMERCIAL

## 2025-06-20 DIAGNOSIS — Z95.1 POSTSURGICAL AORTOCORONARY BYPASS STATUS: Primary | ICD-10-CM

## 2025-06-20 DIAGNOSIS — I25.10 ATHEROSCLEROSIS OF NATIVE CORONARY ARTERY OF NATIVE HEART, UNSPECIFIED WHETHER ANGINA PRESENT: ICD-10-CM

## 2025-06-23 ENCOUNTER — CLINICAL SUPPORT (OUTPATIENT)
Dept: CARDIAC REHAB | Facility: CLINIC | Age: 55
End: 2025-06-23
Payer: COMMERCIAL

## 2025-06-23 DIAGNOSIS — I25.10 ATHEROSCLEROSIS OF NATIVE CORONARY ARTERY OF NATIVE HEART, UNSPECIFIED WHETHER ANGINA PRESENT: ICD-10-CM

## 2025-06-23 DIAGNOSIS — Z95.1 POSTSURGICAL AORTOCORONARY BYPASS STATUS: Primary | ICD-10-CM

## 2025-06-23 PROCEDURE — 93798 PHYS/QHP OP CAR RHAB W/ECG: CPT | Mod: S$GLB,,, | Performed by: INTERNAL MEDICINE

## 2025-06-25 ENCOUNTER — CLINICAL SUPPORT (OUTPATIENT)
Dept: CARDIAC REHAB | Facility: CLINIC | Age: 55
End: 2025-06-25
Payer: COMMERCIAL

## 2025-06-25 ENCOUNTER — DOCUMENTATION ONLY (OUTPATIENT)
Dept: CARDIAC REHAB | Facility: CLINIC | Age: 55
End: 2025-06-25

## 2025-06-25 DIAGNOSIS — I25.10 ATHEROSCLEROSIS OF NATIVE CORONARY ARTERY OF NATIVE HEART WITHOUT ANGINA PECTORIS: ICD-10-CM

## 2025-06-25 DIAGNOSIS — Z95.1 POSTSURGICAL AORTOCORONARY BYPASS STATUS: Primary | ICD-10-CM

## 2025-06-25 PROCEDURE — 93798 PHYS/QHP OP CAR RHAB W/ECG: CPT | Mod: S$GLB,,, | Performed by: INTERNAL MEDICINE

## 2025-06-25 NOTE — PROGRESS NOTES
Pat has completed 12 out of 36 exercise session of Phase II cardiac rehab.  A follow up reassessment will be completed at 24 sessions.    12 Session Follow Up     Cardiac Rehab Individual Treatment Plan - Reassessment      Patient Name: Pat Yung MRN: [id number]   : 1970   Age: 54 y.o.   Primary Diagnosis: CABG Date of Event: 25   EF: 60-65%  Risk Stratification: high  Referring Physician: HASEEB   Exercise Assessment:     Angina with exercise?: No   ST Depression with Exercise?: No  Fall Risk: Yes   Assistive Devices:  independent  Pat stated at orientation there were no limitations to exercise due mentioned she sometimes has lower left side back pain on occasion due to a past fall.  Exercise modalities will be modified if needed.    Comments on Progression: aPt is progressing slowly but her workloads will continue to be increased as she tolerates exercise intensity.    Exercise Plan:   Goals:  CR Exercise Goals: Attend Cardiac Rehab 3 times/week: In Progress  Home Aerobic Exercise: 2 additional days/week for 30-60 minutes: In Progress  Intensity of 12-15 on the Rate of Perceived Exertion (RPE) scale: In Progress  30% increase in entry estimated METS: 7.8 : In Progress  5 days/week for 30-60 minutes: In Progress  Demonstrate proper pulse taking technique: In Progress    Comments on Goal Progression:  Patient Consistency: consistent with attendance  Home exercise? Yes: Walking; Frequency: 2 non-rehab days per week; Duration 15 minutes  Patient reports intensity rate 12-15 on RPE scale  Patient is progressing slowly  Patient is Able to demonstrate proper pulse taking technique with or without a fitness tracker.      Intervention/Recommendations:   Discussed importance of regular attendance to cardiac rehab class    Exercise Prescription:  THR Range 81-94   Mode: Treadmill  Nustep   Frequency:  3 days/week   Duration:  30-60 minutes   Intensity:  12-15 RPE   Resistance Training:  Yes: 3 to  5 lb weights with 10-15 reps based on strength and range of motion and adjusted accordingly     Home Prescription:  Mode Aerobic exercise   Frequency: 2- 3 days/week   Duration: 30-60 minutes   Resistance Training: None     Education:  Muscular Anatomy; verbalizes understanding; Date: 6-9-25  Exercise Program; verbalizes understanding; Date: 6-16-25  Resistance Training; verbalizes understanding; Date: 6-23-25  Body Composition; verbalizes understanding; Date: 4-28-25    Comments:  I reviewed exercise recommendations with Pat.  I encouraged her to continue walking at least 2 non-rehab days per week for at least 30 minutes.  I suggested she try 2 sets of 15 minutes to get to 30 if she can't do 30 minutes in 1 session.  She stated understanding.     The exercise prescription will continue to be adjusted based on tolerance of exercise intensity by patient.    Lavell Egan., CEP

## 2025-06-26 ENCOUNTER — DOCUMENTATION ONLY (OUTPATIENT)
Dept: CARDIAC REHAB | Facility: CLINIC | Age: 55
End: 2025-06-26
Payer: COMMERCIAL

## 2025-06-26 NOTE — PROGRESS NOTES
Pat has completed 12 out of 36 exercise session of Phase II cardiac rehab.  A follow up reassessment will be completed at 24 sessions.    12 Session Follow Up     Cardiac Rehab Individual Treatment Plan - Reassessment      Patient Name: Pat Yung MRN: [id number]   : 1970   Age: 54 y.o.   Primary Diagnosis: CABG Date of Event: 25   EF: 60-65%  Risk Stratification: high  Referring Physician: HASEEB   Exercise Assessment:     Angina with exercise?: No   ST Depression with Exercise?: No  Fall Risk: Yes   Assistive Devices:  independent  Pat stated at orientation there were no limitations to exercise due mentioned she sometimes has lower left side back pain on occasion due to a past fall.  Exercise modalities will be modified if needed.    Comments on Progression: Pat is progressing slowly but her workloads will continue to be increased as she tolerates exercise intensity.    Exercise Plan:   Goals:  CR Exercise Goals: Attend Cardiac Rehab 3 times/week: In Progress  Home Aerobic Exercise: 2 additional days/week for 30-60 minutes: In Progress  Intensity of 12-15 on the Rate of Perceived Exertion (RPE) scale: In Progress  30% increase in entry estimated METS: 7.8 : In Progress  5 days/week for 30-60 minutes: In Progress  Demonstrate proper pulse taking technique: In Progress    Comments on Goal Progression:  Patient Consistency: consistent with attendance  Home exercise? Yes: Walking; Frequency: 2 non-rehab days per week; Duration 15 minutes  Patient reports intensity rate 12-15 on RPE scale  Patient is progressing slowly  Patient is Able to demonstrate proper pulse taking technique with or without a fitness tracker.      Intervention/Recommendations:   Discussed importance of regular attendance to cardiac rehab class    Exercise Prescription:  THR Range 81-94   Mode: Treadmill  Nustep   Frequency:  3 days/week   Duration:  30-60 minutes   Intensity:  12-15 RPE   Resistance Training:  Yes: 3 to  5 lb weights with 10-15 reps based on strength and range of motion and adjusted accordingly     Home Prescription:  Mode Aerobic exercise   Frequency: 2- 3 days/week   Duration: 30-60 minutes   Resistance Training: None     Education:  Muscular Anatomy; verbalizes understanding; Date: 25  Exercise Program; verbalizes understanding; Date: 25  Resistance Training; verbalizes understanding; Date: 25  Body Composition; verbalizes understanding; Date: 25    Comments:  I reviewed exercise recommendations with Pat.  I encouraged her to continue walking at least 2 non-rehab days per week for at least 30 minutes.  I suggested she try 2 sets of 15 minutes to get to 30 if she can't do 30 minutes in 1 session.  She stated understanding.     The exercise prescription will continue to be adjusted based on tolerance of exercise intensity by patient.    Lopez Egan, [facility]    12 Session Follow Up   Cardiac Rehab Individual Treatment Plan - Reassessment    Patient Name: Pat Yung MRN: [id number]   : 1970   Age: 54 y.o.   Primary Diagnosis: CABG    Nutrition Assessment:     Anthropometrics    Height 63 inches   Weight 203.6 lbs   BMI 37.1     Patient confirms she is taking lipitor 40mg for cholesterol control.  Difficulty Chewing or Swallowing: No  Current Exercise: See Exercise Physiologist Note  Food Safety/Food Preparation: spouse  Living Arrangements/Family Support: Lives with spouse  Cultural/Spiritual/Personal Preferences: not applicable   Barriers to Education: none identified  Stage of Change Related to Diet Habits: Maintenance  Recent Changes to Diet: No      Nutrition Plan:   Goals:  LDL-C < 70 (for high risk patients)  Hgb A1c < 7%  BMI < 25 and abdominal girth < 40M/<35 F  2 gram sodium, Mediterranean diet: In Progress  Rehab weight loss goal of 10 lbs, 1 lb per week: In Progress  Fish intake (non-fried varieties) to a goal of 2-3 servings per week: In Progress  Increase fruit and  "vegetable intake: In Progress    Comments on Goal Progression:  Pt states she "feels great" and has noticed sh has more energy and able to do more after finishing rehab classes. She is maintaining diet changes such as no processed foods and focusing on fruits/vegetables for meals/snacks. She also chooses lean or plant based proteins with no red meat.     Interventions:  Dietitian Consult: No  Patient to participate in Cardiac Rehab sessions three times a week  Weekly Dietitian Weight Check  Nutrition Recommendations Provided: Verbal, Reviewed  Follow Up Plan for Ongoing Self-Management Support    Education:  Dining Out; verbalizes understanding; Date: 25  Food Labels; verbalizes understanding; Date: 25  Vitamins; verbalizes understanding; Date: 25  Food Additives; verbalizes understanding; Date: 25    Comments:   Discussed ways to incorporate healthy snacks, eating on a schedule, and monitoring sodium intake for heart health.    Diabetes  Is the patient diabetic? No      Becca Jones MS, RDN/LDN    Session: Orientation   Cardiac Rehab Individual Treatment Plan - Initial Assessment      Patient Name: Pat Yung MRN: [id number]   : 1970   Age: 54 y.o.   Date of Event: 2025   Primary Diagnosis: S/P CABG    EF: 60-65%    Physical Assessment:   LMP 2023 (Exact Date)     ASSESSMENT:  Heart Sounds: regular rate and rhythm  Prosthetic Valve: No  Lung Sounds: normal air entry, lungs clear to auscultation  Capillary Refill: normal  Left Radial Pulse: Normal (+2)  Right Radial Pulse: Normal (+2)  Left Pedal Pulse: Normal (+2)  Right Pedal Pulse: Normal (+2)  Right Edema: Trace  Left Edema 1+  Strength: good  Range of Motion: full range of motion  Existing Limitations:     Site   Arthritis, bursitis    Amputation, atrophy    Other: C/O soreness in chest with arm movement, advised pt to wear soft supportive bra         Diabetic patient's foot examination comments: None -  " Neither  Incisional site: healing well  Special needs: N/A    Psychosocial Assessment:   Outcome Survey Tools:    RADHIKA SCORES:               SF-36             PHQ-9:      4/24/2025     7:40 AM   PHQ-9 Depression Patient Health Questionnaire   Over the last two weeks how often have you been bothered by little interest or pleasure in doing things 2   Over the last two weeks how often have you been bothered by feeling down, depressed or hopeless 0   Over the last two weeks how often have you been bothered by trouble falling or staying asleep, or sleeping too much 0   Over the last two weeks how often have you been bothered by feeling tired or having little energy 2   Over the last two weeks how often have you been bothered by a poor appetite or overeating 0   Over the last two weeks how often have you been bothered by feeling bad about yourself - or that you are a failure or have let yourself or your family down 0   Over the last two weeks how often have you been bothered by trouble concentrating on things, such as reading the newspaper or watching television 0   Over the last two weeks how often have you been bothered by moving or speaking so slowly that other people could have noticed. 0   Over the last two weeks how often have you been bothered by thoughts that you would be better off dead, or of hurting yourself 0   If you checked off any problems, how difficult have these problems made it for you to do your work, take care of things at home or get along with other people? Somewhat difficult   PHQ-9 Score 4              Living Arrangements: Lives with spouse  Family Support: children, grandchildren, and spouse  Self Reported: Effective Coping Skills  Displays: happiness and calmness  Medication: not applicable    Psychosocial Plan:   Goals:  Improved psychosocial coping strategies  Maintain positive support system  Maintain positive outlook  Improve overall quality of  life    Interventions/Recommendations:  Discussed Results of Surveys  Patient to Self Report Emotional Changes at Session Check In  Recommend Physical Activity  Recommend Attending Education Lectures  Notify MD: Declined  Program Referral: Declined  Pharmaceutical Intervention/Therapy: Declined  Other Needs: not applicable  Stage of Readiness to Change: Preparation    Education:  Stress; verbalizes understanding; Date: 4/24/2025    Comments:  Pt denies any overwhelming stress or anxiety. Admits that she is not feeling upbeat yet due to physical deconditioning.  Patient has been instructed to notify staff in the event that circumstances worsen.  Patient verbalizes understanding.    Other Core Components/Risk Factors Assessment:   RISK FACTORS:  hyperlipidemia, hypertension, obesity, positive family history    Learning Barriers: None    Education Level:  Attended College/Technical School    Pre-test Score: 50    Medication Compliance: has been compliant with taking medications    Other Core Components/Risk Factors Plan:   Goals:  Decrease cholesterol level: Met  Increase exercise tolerance: In Progress  Increase knowledge of CAD: In Progress  Decrease blood pressure: Met  Weight loss: In Progress  Learn more about healthy eating: In Progress    Interventions/Recommendations:  Recommend regular attendance for Cardiac Rehab: Exercise and Education Lectures  Encourage medication compliance  Individual Education/ Counseling: Yes  Physician Referral: No    Education:    risk factors, verbalizes understanding; Date: 4/24/2025        Education method adapted to patients education level and preferred method of learning.  Method: explanation    Comments:  Pt is monitoring her sodium intake, eating less, walking for exercise and very motivated to continue managing her risk factors for heart disease.    Other Core Components/Hypertension Assessment:   Resting BP: 122/80  BP Readings from Last 1 Encounters:   05/21/25 106/68          BP Diagnosis: Hypertensive  Patient reported symptoms: tiredness/fatigue    Medications:  Medication Prescribed? Adherent? Exception   Beta-blocker [x]Yes  []No  []Unknown [x]Yes  []No  []Unknown    ACEI/ARB []Yes  [x]No  []Unknown []Yes  []No  []Unknown    Calcium Channel Blocker [x]Yes  []No  []Unknown [x]Yes  []No  []Unknown    Diuretic []Yes  [x]No  []Unknown []Yes  []No  []Unknown        Other Core Components/Hypertension Plan:   Goals:  Blood Pressure <130/80    Interventions/Recommendations:  Med Card Reconciled: Yes  Encourage medication compliance  Encourage sodium reduction  Reduce alcohol consumption  Encourage weight loss  Recommend physical activity  Educate on contributory factors  Reduce stress, anxiety, anger, depression, and/or chronic pain  Encourage home blood pressure monitoring  Recommend daily weights  MD notified/Physician Referral: No    Education:    Hypertension; verbalizes understanding; Date: 4/24/2025  Risk Factors; verbalizes understanding; Date: 4/24/2025        Comments:  Pt will be monitoring her blood pressure at home keeping a log for her providers. Pt will continue reducing her sodium intake and walking regularly for exercise.      Does the patient have Heart Failure? No    Other Core Components/Tobacco Cessation Assessment:   Smoking Status: Lifetime Non-smoker  Primary Tobacco Type: N/A  Tobacco Usage: no  Smoking Cessation Barriers: N/A  Stage of Readiness to Change: Maintenance    Other Core Components/Tobacco Cessation Plan:   Goals:  Maintain non-smoking status    Interventions:  Maintains non-smoking status    Education:    Coronary Artery Disease; verbalizes understanding; Date: 4/24/2025        Comments:  Pt never uses tobacco products.    Discussed Cardiac Rehab program in depth with patient.  Medication list updated per patient & marked as reviewed.  Patient has been instructed to notify staff of any problems while attending rehab (ie: chest pain, shortness of  breath, lightheadedness, dizziness).  Patient has been instructed to monitor blood pressure readings outside of rehab & to keep a daily log of the readings.  Patient verbalizes understanding.    Pal Brock RN

## 2025-06-26 NOTE — PROGRESS NOTES
"12 Session Follow Up   Cardiac Rehab Individual Treatment Plan - Reassessment    Patient Name: Pat Yung MRN: [id number]   : 1970   Age: 54 y.o.   Primary Diagnosis: CABG    Nutrition Assessment:     Anthropometrics    Height 63 inches   Weight 203.6 lbs   BMI 37.1     Patient confirms she is taking lipitor 40mg for cholesterol control.  Difficulty Chewing or Swallowing: No  Current Exercise: See Exercise Physiologist Note  Food Safety/Food Preparation: spouse  Living Arrangements/Family Support: Lives with spouse  Cultural/Spiritual/Personal Preferences: not applicable   Barriers to Education: none identified  Stage of Change Related to Diet Habits: Maintenance  Recent Changes to Diet: No      Nutrition Plan:   Goals:  LDL-C < 70 (for high risk patients)  Hgb A1c < 7%  BMI < 25 and abdominal girth < 40M/<35 F  2 gram sodium, Mediterranean diet: In Progress  Rehab weight loss goal of 10 lbs, 1 lb per week: In Progress  Fish intake (non-fried varieties) to a goal of 2-3 servings per week: In Progress  Increase fruit and vegetable intake: In Progress    Comments on Goal Progression:  Pt states she "feels great" and has noticed sh has more energy and able to do more after finishing rehab classes. She is maintaining diet changes such as no processed foods and focusing on fruits/vegetables for meals/snacks. She also chooses lean or plant based proteins with no red meat.     Interventions:  Dietitian Consult: No  Patient to participate in Cardiac Rehab sessions three times a week  Weekly Dietitian Weight Check  Nutrition Recommendations Provided: Verbal, Reviewed  Follow Up Plan for Ongoing Self-Management Support    Education:  Dining Out; verbalizes understanding; Date: 25  Food Labels; verbalizes understanding; Date: 25  Vitamins; verbalizes understanding; Date: 25  Food Additives; verbalizes understanding; Date: 25    Comments:   Discussed ways to incorporate healthy snacks, eating on a " schedule, and monitoring sodium intake for heart health.    Diabetes  Is the patient diabetic? No      Becca Jones MS, RDN/LDN

## 2025-06-27 ENCOUNTER — CLINICAL SUPPORT (OUTPATIENT)
Dept: CARDIAC REHAB | Facility: CLINIC | Age: 55
End: 2025-06-27
Payer: COMMERCIAL

## 2025-06-27 DIAGNOSIS — Z95.1 POSTSURGICAL AORTOCORONARY BYPASS STATUS: Primary | ICD-10-CM

## 2025-06-27 DIAGNOSIS — I25.10 ATHEROSCLEROSIS OF NATIVE CORONARY ARTERY OF NATIVE HEART WITHOUT ANGINA PECTORIS: ICD-10-CM

## 2025-06-30 ENCOUNTER — CLINICAL SUPPORT (OUTPATIENT)
Dept: CARDIAC REHAB | Facility: CLINIC | Age: 55
End: 2025-06-30
Payer: COMMERCIAL

## 2025-06-30 DIAGNOSIS — I25.10 ASCVD (ARTERIOSCLEROTIC CARDIOVASCULAR DISEASE): ICD-10-CM

## 2025-06-30 DIAGNOSIS — Z95.1 POSTSURGICAL AORTOCORONARY BYPASS STATUS: Primary | ICD-10-CM

## 2025-06-30 PROCEDURE — 93798 PHYS/QHP OP CAR RHAB W/ECG: CPT | Mod: S$GLB,,, | Performed by: INTERNAL MEDICINE

## 2025-07-02 ENCOUNTER — CLINICAL SUPPORT (OUTPATIENT)
Dept: CARDIAC REHAB | Facility: CLINIC | Age: 55
End: 2025-07-02
Payer: COMMERCIAL

## 2025-07-02 DIAGNOSIS — I25.10 ATHEROSCLEROSIS OF NATIVE CORONARY ARTERY OF NATIVE HEART, UNSPECIFIED WHETHER ANGINA PRESENT: ICD-10-CM

## 2025-07-02 DIAGNOSIS — Z95.1 POSTSURGICAL AORTOCORONARY BYPASS STATUS: Primary | ICD-10-CM

## 2025-07-02 PROCEDURE — 93798 PHYS/QHP OP CAR RHAB W/ECG: CPT | Mod: S$GLB,,, | Performed by: INTERNAL MEDICINE

## 2025-07-02 NOTE — PROGRESS NOTES
Session: 12 Session Follow Up   Cardiac Rehab Individual Treatment Plan - Reassessment      Patient Name: Pat Yung MRN: [id number]   : 1970   Age: 54 y.o.   Primary Diagnosis: CABG      Psychosocial Assessment:   Living Arrangements: Lives with spouse  Family Support: children, grandchildren, and spouse  Self Reported: decrease Effective Coping Skills  Displays: calmness  Medication: not applicable    Psychosocial Plan:   Goals:  Improved psychosocial coping strategies: In Progress  Maintain positive support system: Met  Maintain positive outlook: In Progress  Improve overall quality of life: In Progress    Comments on Goal Progression:  Patient will continue to work on achieving goals that were set.  She reports to be feeling physically & emotionally better since beginning cardiac rehab.  She feels that she has more energy than previously. She states that she has good support from her  & family.  She is attending lectures/exercise sessions on a consistent basis & is exercising about 15 minutes 2 days per week outside of cardiac rehab.  Encouraged for her to try to do at least 30 minutes of exercise at home.      Interventions:  Patient to Self Report Emotional Changes at Session Check In  Recommend Physical Activity  Recommend Attending Education Lectures  Notify MD: No  Program Referral: No  Pharmaceutical Intervention/Therapy: No  Other Needs: not applicable  Stage of Readiness to Change: Action    Education:  Stress Management; verbalizes understanding; Date: 2025 & 2025  Stress; verbalizes understanding; Date: 2025 & 2025      She has been instructed to notify staff in the event that circumstances worsen.  Patient verbalizes understanding.    Other Core Components/Risk Factors Assessment:   RISK FACTORS:  hyperlipidemia, hypertension, obesity, positive family history    Learning Barriers: None    Medication Compliance: has been compliant with taking medications    Other  Core Components/Risk Factors Plan:   Goals:  Decrease cholesterol level: In Progress  Increase exercise tolerance: In Progress  Increase knowledge of CAD: In Progress  Decrease blood pressure: Met  Weight loss: Met  Learn more about healthy eating: In Progress    Comments on Goal Progression:  Patient will continue to work on achieving goals that were set to decrease risk factors for CAD.  BP readings are at goal & she has lost 6 lbs thus far.  She is attending lectures/exercise sessions on a consistent basis.    Interventions:  Individual Education/ Counseling: Yes  Physician Referral: No    Education:    fluid overload/CHF, verbalizes understanding; Date: 6/27/2025  hypertension, verbalizes understanding; Date: 6/27/2025  physical activity, verbalizes understanding; Date: 6/27/2025  risk factors, verbalizes understanding; Date: 6/27/2025  sodium, verbalizes understanding; Date: 6/27/2025  stress, verbalizes understanding; Date: 6/13/2025 & 6/27/2025        Education method adapted to patients education level and preferred method of learning.  Method: explanation  handouts        Other Core Components/Hypertension Assessment:   BP Range: 128-104/80-64  BP at Goal: Yes  Patient reported symptoms: none    Medications:  Medication Prescribed? Adherent? Exception   Beta-blocker [x]Yes  []No  []Unknown [x]Yes  []No  []Unknown    ACEI/ARB []Yes  [x]No  []Unknown []Yes  []No  []Unknown    Calcium Channel Blocker [x]Yes  []No  []Unknown [x]Yes  []No  []Unknown    Diuretic []Yes  [x]No  []Unknown []Yes  []No  []Unknown        Other Core Components/Hypertension Plan:   Goals:  Blood Pressure <130/80    Comments on Goal Progression:  Patient will continue to work on keeping BP at or below goal of 130/80.  She is monitoring BP at home & is obtaining similar readings to those obtained in cardiac rehab.  She will be following recommendations to keep control of BP.  She is monitoring her sodium intake  carefully.    Interventions:  Med Card Reconciled: Yes  Encourage medication compliance  Encourage sodium reduction  Encourage weight loss  Recommend physical activity  Educate on contributory factors  Reduce stress, anxiety, anger, depression, and/or chronic pain  Encourage home blood pressure monitoring  Recommend daily weights    Education:    Hypertension; verbalizes understanding; Date: 6/27/2025  Coronary Artery Disease; verbalizes understanding; Date: 6/27/2025  Risk Factors; verbalizes understanding; Date: 6/27/2025  Medication I; verbalizes understanding; Date: 6/20/2025  Stress; verbalizes understanding; Date: 6/13/2025 & 6/27/2025        Does the patient have Heart Failure? No      Other Core Components/Tobacco Cessation Assessment:   Smoking Status: Lifetime Non-smoker  Primary Tobacco Type: N/A  Tobacco Usage: no  Smoking Cessation Barriers: none  Stage of Readiness to Change: Maintenance    Other Core Components/Tobacco Cessation Plan:   Goals:  Maintain non-smoking status    Comments on Goal Progression:  Non smoker.    Interventions:  Maintains non-smoking status    Education:    Risk Factors; verbalizes understanding; Date: 6/27/2025         Comments:   Encouraged patient to continue to work on achieving goals that were set.  Will continue to monitor.    Victoria Alexandra RN  Cardiac Rehab Nurse

## 2025-07-07 ENCOUNTER — CLINICAL SUPPORT (OUTPATIENT)
Dept: CARDIAC REHAB | Facility: CLINIC | Age: 55
End: 2025-07-07
Payer: COMMERCIAL

## 2025-07-07 DIAGNOSIS — I25.10 ATHEROSCLEROSIS OF NATIVE CORONARY ARTERY OF NATIVE HEART, UNSPECIFIED WHETHER ANGINA PRESENT: ICD-10-CM

## 2025-07-07 DIAGNOSIS — Z95.1 POSTSURGICAL AORTOCORONARY BYPASS STATUS: Primary | ICD-10-CM

## 2025-07-07 PROCEDURE — 93798 PHYS/QHP OP CAR RHAB W/ECG: CPT | Mod: S$GLB,,, | Performed by: INTERNAL MEDICINE

## 2025-07-08 ENCOUNTER — OFFICE VISIT (OUTPATIENT)
Dept: PRIMARY CARE CLINIC | Facility: CLINIC | Age: 55
End: 2025-07-08
Payer: COMMERCIAL

## 2025-07-08 ENCOUNTER — PATIENT MESSAGE (OUTPATIENT)
Dept: CARDIOTHORACIC SURGERY | Facility: CLINIC | Age: 55
End: 2025-07-08
Payer: COMMERCIAL

## 2025-07-08 VITALS
RESPIRATION RATE: 18 BRPM | HEIGHT: 63 IN | BODY MASS INDEX: 35.93 KG/M2 | SYSTOLIC BLOOD PRESSURE: 124 MMHG | DIASTOLIC BLOOD PRESSURE: 64 MMHG | HEART RATE: 67 BPM | OXYGEN SATURATION: 98 % | WEIGHT: 202.81 LBS

## 2025-07-08 DIAGNOSIS — Z76.89 RETURN TO WORK EXAM: ICD-10-CM

## 2025-07-08 DIAGNOSIS — Z95.1 HX OF CABG: Primary | ICD-10-CM

## 2025-07-08 DIAGNOSIS — Z56.89: ICD-10-CM

## 2025-07-08 PROCEDURE — 3074F SYST BP LT 130 MM HG: CPT | Mod: CPTII,S$GLB,, | Performed by: STUDENT IN AN ORGANIZED HEALTH CARE EDUCATION/TRAINING PROGRAM

## 2025-07-08 PROCEDURE — 3044F HG A1C LEVEL LT 7.0%: CPT | Mod: CPTII,S$GLB,, | Performed by: STUDENT IN AN ORGANIZED HEALTH CARE EDUCATION/TRAINING PROGRAM

## 2025-07-08 PROCEDURE — 99999 PR PBB SHADOW E&M-EST. PATIENT-LVL IV: CPT | Mod: PBBFAC,,, | Performed by: STUDENT IN AN ORGANIZED HEALTH CARE EDUCATION/TRAINING PROGRAM

## 2025-07-08 PROCEDURE — 1160F RVW MEDS BY RX/DR IN RCRD: CPT | Mod: CPTII,S$GLB,, | Performed by: STUDENT IN AN ORGANIZED HEALTH CARE EDUCATION/TRAINING PROGRAM

## 2025-07-08 PROCEDURE — 3008F BODY MASS INDEX DOCD: CPT | Mod: CPTII,S$GLB,, | Performed by: STUDENT IN AN ORGANIZED HEALTH CARE EDUCATION/TRAINING PROGRAM

## 2025-07-08 PROCEDURE — 99214 OFFICE O/P EST MOD 30 MIN: CPT | Mod: S$GLB,,, | Performed by: STUDENT IN AN ORGANIZED HEALTH CARE EDUCATION/TRAINING PROGRAM

## 2025-07-08 PROCEDURE — 1159F MED LIST DOCD IN RCRD: CPT | Mod: CPTII,S$GLB,, | Performed by: STUDENT IN AN ORGANIZED HEALTH CARE EDUCATION/TRAINING PROGRAM

## 2025-07-08 PROCEDURE — 4010F ACE/ARB THERAPY RXD/TAKEN: CPT | Mod: CPTII,S$GLB,, | Performed by: STUDENT IN AN ORGANIZED HEALTH CARE EDUCATION/TRAINING PROGRAM

## 2025-07-08 PROCEDURE — 3078F DIAST BP <80 MM HG: CPT | Mod: CPTII,S$GLB,, | Performed by: STUDENT IN AN ORGANIZED HEALTH CARE EDUCATION/TRAINING PROGRAM

## 2025-07-08 NOTE — PROGRESS NOTES
"Subjective:       Patient ID: Pat Yung is a 54 y.o. female.    Chief Complaint: Follow-up (6 month follow up)    HPI:  54 y.o. female presents to [facility] here for 6 month follow-up visit.    Would like to return to work at this time. Continuing in Cardiac Rehab and will see Cardiology again 8/13/2025. CABG was performed 2/11/2025. Works as . Lifting heavier can be a requirement.    No other concerns    Review of Systems   Constitutional:  Negative for chills, diaphoresis, fatigue and fever.   HENT:  Negative for congestion, sinus pressure, sneezing and sore throat.    Respiratory:  Negative for cough, chest tightness and shortness of breath.    Cardiovascular:  Positive for leg swelling (Mild to bilateral ankles). Negative for chest pain and palpitations.   Gastrointestinal:  Negative for abdominal pain, diarrhea, nausea and vomiting.   Musculoskeletal:  Negative for arthralgias, joint swelling and myalgias.   Skin:  Negative for rash and wound.   Neurological:  Negative for dizziness, light-headedness and headaches.       Objective:      Vitals:    07/08/25 1100   BP: 124/64   BP Location: Right arm   Patient Position: Sitting   Pulse: 67   Resp: 18   SpO2: 98%   Weight: 92 kg (202 lb 13.2 oz)   Height: 5' 3" (1.6 m)     Physical Exam  Vitals reviewed.   Constitutional:       General: She is not in acute distress.     Appearance: Normal appearance. She is not ill-appearing.   HENT:      Head: Normocephalic and atraumatic.   Eyes:      General:         Right eye: No discharge.         Left eye: No discharge.      Conjunctiva/sclera: Conjunctivae normal.   Cardiovascular:      Rate and Rhythm: Normal rate and regular rhythm.      Pulses: Normal pulses.      Heart sounds: No murmur heard.  Pulmonary:      Effort: Pulmonary effort is normal.      Breath sounds: Normal breath sounds.   Musculoskeletal:         General: No deformity.      Cervical back: Neck supple. No rigidity. "   Lymphadenopathy:      Cervical: No cervical adenopathy.   Skin:     General: Skin is warm and dry.      Coloration: Skin is not jaundiced.   Neurological:      General: No focal deficit present.      Mental Status: She is alert and oriented to person, place, and time.   Psychiatric:         Mood and Affect: Mood normal.         Behavior: Behavior normal.             Lab Results   Component Value Date     05/05/2025     02/28/2025    K 3.8 05/05/2025    K 4.1 02/28/2025     05/05/2025     02/28/2025    CO2 26 05/05/2025    CO2 21 (L) 02/28/2025    BUN 8 05/05/2025    CREATININE 0.6 05/05/2025    ANIONGAP 7 (L) 05/05/2025     Lab Results   Component Value Date    HGBA1C 5.6 02/20/2025     Lab Results   Component Value Date    BNP 58 05/05/2025    BNP 14 10/28/2024    BNP <10 01/14/2024       Lab Results   Component Value Date    WBC 9.77 05/05/2025    HGB 12.5 05/05/2025    HGB 8.4 (L) 02/28/2025    HCT 39.4 05/05/2025    HCT 26.5 (L) 02/28/2025    HCT 30 (L) 02/24/2025     (H) 05/05/2025     02/28/2025    GRAN 7.4 02/28/2025    GRAN 58.8 02/28/2025     Lab Results   Component Value Date    CHOL 129 04/16/2025    CHOL 251 (H) 10/29/2024    HDL 46 04/16/2025    LDLCALC 71.6 04/16/2025    TRIG 57 04/16/2025    TRIG 132 10/29/2024        Current Medications[1]        Assessment:       1. Hx of CABG    2. Seeking work    3. On sick leave from work           Plan:       Hx of CABG  Return to work exam  On sick leave from work  - Discussed with patient's Cardiologist Ilana Pugh NP for recommendations on return to work and restrictions if needed.Recommended no lifting over 10-15 lb; no prolonged standing or walking without rest; no pushing, pulling or carrying heavy objects; sedentary or desk work only.  - No concerns today's exam    RTC in 3 months         [1]   Current Outpatient Medications:     amLODIPine (NORVASC) 5 MG tablet, Take 1 tablet (5 mg total) by mouth once daily.,  Disp: 30 tablet, Rfl: 3    aspirin (ECOTRIN) 81 MG EC tablet, Take 1 tablet (81 mg total) by mouth once daily., Disp: 90 tablet, Rfl: 3    atorvastatin (LIPITOR) 40 MG tablet, Take 1 tablet (40 mg total) by mouth every evening., Disp: 90 tablet, Rfl: 3    metoprolol tartrate (LOPRESSOR) 25 MG tablet, Take 1 tablet (25 mg total) by mouth 2 (two) times daily., Disp: 60 tablet, Rfl: 11    omeprazole (PRILOSEC) 40 MG capsule, Take 1 capsule (40 mg total) by mouth once daily., Disp: 90 capsule, Rfl: 3    oxyCODONE (OXY-IR) 5 mg Cap, Take 5 mg by mouth every 4 (four) hours as needed., Disp: , Rfl:     valACYclovir (VALTREX) 1000 MG tablet, Take 1 tablet (1,000 mg total) by mouth every 12 (twelve) hours. Begin taking at first sign of outbreak, Disp: 14 tablet, Rfl: 5

## 2025-07-09 ENCOUNTER — CLINICAL SUPPORT (OUTPATIENT)
Dept: CARDIAC REHAB | Facility: CLINIC | Age: 55
End: 2025-07-09
Payer: COMMERCIAL

## 2025-07-09 DIAGNOSIS — Z95.1 POSTSURGICAL AORTOCORONARY BYPASS STATUS: Primary | ICD-10-CM

## 2025-07-09 DIAGNOSIS — I25.10 CORONARY ATHEROSCLEROSIS OF NATIVE CORONARY ARTERY: ICD-10-CM

## 2025-07-09 PROCEDURE — 93798 PHYS/QHP OP CAR RHAB W/ECG: CPT | Mod: S$GLB,,, | Performed by: INTERNAL MEDICINE

## 2025-07-11 ENCOUNTER — CLINICAL SUPPORT (OUTPATIENT)
Dept: CARDIAC REHAB | Facility: CLINIC | Age: 55
End: 2025-07-11
Payer: COMMERCIAL

## 2025-07-11 DIAGNOSIS — I25.10 CORONARY ATHEROSCLEROSIS OF NATIVE CORONARY ARTERY: ICD-10-CM

## 2025-07-11 DIAGNOSIS — Z95.1 POSTSURGICAL AORTOCORONARY BYPASS STATUS: Primary | ICD-10-CM

## 2025-07-11 PROCEDURE — 99999 PR PBB SHADOW E&M-EST. PATIENT-LVL I: CPT | Mod: PBBFAC,,,

## 2025-07-14 ENCOUNTER — CLINICAL SUPPORT (OUTPATIENT)
Dept: CARDIAC REHAB | Facility: CLINIC | Age: 55
End: 2025-07-14
Payer: COMMERCIAL

## 2025-07-14 DIAGNOSIS — Z95.1 S/P CABG (CORONARY ARTERY BYPASS GRAFT): Primary | ICD-10-CM

## 2025-07-14 DIAGNOSIS — I25.10 CVD (CARDIOVASCULAR DISEASE): ICD-10-CM

## 2025-07-14 PROCEDURE — 93798 PHYS/QHP OP CAR RHAB W/ECG: CPT | Mod: S$GLB,,, | Performed by: INTERNAL MEDICINE

## 2025-07-16 ENCOUNTER — CLINICAL SUPPORT (OUTPATIENT)
Dept: CARDIAC REHAB | Facility: CLINIC | Age: 55
End: 2025-07-16
Payer: COMMERCIAL

## 2025-07-16 DIAGNOSIS — I25.10 CORONARY ATHEROSCLEROSIS OF NATIVE CORONARY ARTERY: ICD-10-CM

## 2025-07-16 DIAGNOSIS — Z95.1 POSTSURGICAL AORTOCORONARY BYPASS STATUS: Primary | ICD-10-CM

## 2025-07-16 PROCEDURE — 93798 PHYS/QHP OP CAR RHAB W/ECG: CPT | Mod: S$GLB,,, | Performed by: INTERNAL MEDICINE

## 2025-07-17 ENCOUNTER — DOCUMENTATION ONLY (OUTPATIENT)
Dept: CARDIAC REHAB | Facility: CLINIC | Age: 55
End: 2025-07-17
Payer: COMMERCIAL

## 2025-07-17 NOTE — PROGRESS NOTES
Pat has completed 20 out of 36 exercise session of Phase II cardiac rehab.  A follow up reassessment will be completed at 24 sessions.    12 Session Follow Up     Cardiac Rehab Individual Treatment Plan - Reassessment      Patient Name: Pat Yung MRN: [id number]   : 1970   Age: 54 y.o.   Primary Diagnosis: CABG Date of Event: 25   EF: 60-65%  Risk Stratification: high  Referring Physician: HASEEB   Exercise Assessment:     Angina with exercise?: No   ST Depression with Exercise?: No  Fall Risk: Yes   Assistive Devices:  independent  Pat stated at orientation there were no limitations to exercise due mentioned she sometimes has lower left side back pain on occasion due to a past fall.  Exercise modalities will be modified if needed.    Comments on Progression: Pat is progressing slowly but her workloads will continue to be increased as she tolerates exercise intensity.    Exercise Plan:   Goals:  CR Exercise Goals: Attend Cardiac Rehab 3 times/week: In Progress  Home Aerobic Exercise: 2 additional days/week for 30-60 minutes: In Progress  Intensity of 12-15 on the Rate of Perceived Exertion (RPE) scale: In Progress  30% increase in entry estimated METS: 7.8 : In Progress  5 days/week for 30-60 minutes: In Progress  Demonstrate proper pulse taking technique: In Progress    Comments on Goal Progression:  Patient Consistency: consistent with attendance  Home exercise? Yes: Walking; Frequency: 2 non-rehab days per week; Duration 15 minutes  Patient reports intensity rate 12-15 on RPE scale  Patient is progressing slowly  Patient is Able to demonstrate proper pulse taking technique with or without a fitness tracker.      Intervention/Recommendations:   Discussed importance of regular attendance to cardiac rehab class    Exercise Prescription:  THR Range 81-94   Mode: Treadmill  Nustep   Frequency:  3 days/week   Duration:  30-60 minutes   Intensity:  12-15 RPE   Resistance Training:  Yes: 3 to  5 lb weights with 10-15 reps based on strength and range of motion and adjusted accordingly     Home Prescription:  Mode Aerobic exercise   Frequency: 2- 3 days/week   Duration: 30-60 minutes   Resistance Training: None     Education:  Muscular Anatomy; verbalizes understanding; Date: 25  Exercise Program; verbalizes understanding; Date: 25  Resistance Training; verbalizes understanding; Date: 25  Body Composition; verbalizes understanding; Date: 25    Comments:  I reviewed exercise recommendations with Pat.  I encouraged her to continue walking at least 2 non-rehab days per week for at least 30 minutes.  I suggested she try 2 sets of 15 minutes to get to 30 if she can't do 30 minutes in 1 session.  She stated understanding.     The exercise prescription will continue to be adjusted based on tolerance of exercise intensity by patient.    Lopez Egan, [facility]    12 Session Follow Up   Cardiac Rehab Individual Treatment Plan - Reassessment    Patient Name: Pat Yung MRN: [id number]   : 1970   Age: 54 y.o.   Primary Diagnosis: CABG    Nutrition Assessment:     Anthropometrics    Height 63 inches   Weight 203.6 lbs   BMI 37.1     Patient confirms she is taking lipitor 40mg for cholesterol control.  Difficulty Chewing or Swallowing: No  Current Exercise: See Exercise Physiologist Note  Food Safety/Food Preparation: spouse  Living Arrangements/Family Support: Lives with spouse  Cultural/Spiritual/Personal Preferences: not applicable   Barriers to Education: none identified  Stage of Change Related to Diet Habits: Maintenance  Recent Changes to Diet: No      Nutrition Plan:   Goals:  LDL-C < 70 (for high risk patients)  Hgb A1c < 7%  BMI < 25 and abdominal girth < 40M/<35 F  2 gram sodium, Mediterranean diet: In Progress  Rehab weight loss goal of 10 lbs, 1 lb per week: In Progress  Fish intake (non-fried varieties) to a goal of 2-3 servings per week: In Progress  Increase fruit and  "vegetable intake: In Progress    Comments on Goal Progression:  Pt states she "feels great" and has noticed sh has more energy and able to do more after finishing rehab classes. She is maintaining diet changes such as no processed foods and focusing on fruits/vegetables for meals/snacks. She also chooses lean or plant based proteins with no red meat.     Interventions:  Dietitian Consult: No  Patient to participate in Cardiac Rehab sessions three times a week  Weekly Dietitian Weight Check  Nutrition Recommendations Provided: Verbal, Reviewed  Follow Up Plan for Ongoing Self-Management Support    Education:  Dining Out; verbalizes understanding; Date: 25  Food Labels; verbalizes understanding; Date: 25  Vitamins; verbalizes understanding; Date: 25  Food Additives; verbalizes understanding; Date: 25    Comments:   Discussed ways to incorporate healthy snacks, eating on a schedule, and monitoring sodium intake for heart health.    Diabetes  Is the patient diabetic? No      Becca Jones MS, RDN/LDN    Session: 12 Session Follow Up   Cardiac Rehab Individual Treatment Plan - Reassessment      Patient Name: Pat Yung MRN: [id number]   : 1970   Age: 54 y.o.   Primary Diagnosis: CABG      Psychosocial Assessment:   Living Arrangements: Lives with spouse  Family Support: children, grandchildren, and spouse  Self Reported: decrease Effective Coping Skills  Displays: calmness  Medication: not applicable    Psychosocial Plan:   Goals:  Improved psychosocial coping strategies: In Progress  Maintain positive support system: Met  Maintain positive outlook: In Progress  Improve overall quality of life: In Progress    Comments on Goal Progression:  Patient will continue to work on achieving goals that were set.  She reports to be feeling physically & emotionally better since beginning cardiac rehab.  She feels that she has more energy than previously. She states that she has good support from her "  & family.  She is attending lectures/exercise sessions on a consistent basis & is exercising about 15 minutes 2 days per week outside of cardiac rehab.  Encouraged for her to try to do at least 30 minutes of exercise at home.      Interventions:  Patient to Self Report Emotional Changes at Session Check In  Recommend Physical Activity  Recommend Attending Education Lectures  Notify MD: No  Program Referral: No  Pharmaceutical Intervention/Therapy: No  Other Needs: not applicable  Stage of Readiness to Change: Action    Education:  Stress Management; verbalizes understanding; Date: 6/13/2025 & 6/27/2025  Stress; verbalizes understanding; Date: 6/13/2025 & 6/27/2025      She has been instructed to notify staff in the event that circumstances worsen.  Patient verbalizes understanding.    Other Core Components/Risk Factors Assessment:   RISK FACTORS:  hyperlipidemia, hypertension, obesity, positive family history    Learning Barriers: None    Medication Compliance: has been compliant with taking medications    Other Core Components/Risk Factors Plan:   Goals:  Decrease cholesterol level: In Progress  Increase exercise tolerance: In Progress  Increase knowledge of CAD: In Progress  Decrease blood pressure: Met  Weight loss: Met  Learn more about healthy eating: In Progress    Comments on Goal Progression:  Patient will continue to work on achieving goals that were set to decrease risk factors for CAD.  BP readings are at goal & she has lost 6 lbs thus far.  She is attending lectures/exercise sessions on a consistent basis.    Interventions:  Individual Education/ Counseling: Yes  Physician Referral: No    Education:    fluid overload/CHF, verbalizes understanding; Date: 6/27/2025  hypertension, verbalizes understanding; Date: 6/27/2025  physical activity, verbalizes understanding; Date: 6/27/2025  risk factors, verbalizes understanding; Date: 6/27/2025  sodium, verbalizes understanding; Date: 6/27/2025  stress,  verbalizes understanding; Date: 6/13/2025 & 6/27/2025        Education method adapted to patients education level and preferred method of learning.  Method: explanation  handouts        Other Core Components/Hypertension Assessment:   BP Range: 128-104/80-64  BP at Goal: Yes  Patient reported symptoms: none    Medications:  Medication Prescribed? Adherent? Exception   Beta-blocker [x]Yes  []No  []Unknown [x]Yes  []No  []Unknown    ACEI/ARB []Yes  [x]No  []Unknown []Yes  []No  []Unknown    Calcium Channel Blocker [x]Yes  []No  []Unknown [x]Yes  []No  []Unknown    Diuretic []Yes  [x]No  []Unknown []Yes  []No  []Unknown        Other Core Components/Hypertension Plan:   Goals:  Blood Pressure <130/80    Comments on Goal Progression:  Patient will continue to work on keeping BP at or below goal of 130/80.  She is monitoring BP at home & is obtaining similar readings to those obtained in cardiac rehab.  She will be following recommendations to keep control of BP.  She is monitoring her sodium intake carefully.    Interventions:  Med Card Reconciled: Yes  Encourage medication compliance  Encourage sodium reduction  Encourage weight loss  Recommend physical activity  Educate on contributory factors  Reduce stress, anxiety, anger, depression, and/or chronic pain  Encourage home blood pressure monitoring  Recommend daily weights    Education:    Hypertension; verbalizes understanding; Date: 6/27/2025  Coronary Artery Disease; verbalizes understanding; Date: 6/27/2025  Risk Factors; verbalizes understanding; Date: 6/27/2025  Medication I; verbalizes understanding; Date: 6/20/2025  Stress; verbalizes understanding; Date: 6/13/2025 & 6/27/2025        Does the patient have Heart Failure? No      Other Core Components/Tobacco Cessation Assessment:   Smoking Status: Lifetime Non-smoker  Primary Tobacco Type: N/A  Tobacco Usage: no  Smoking Cessation Barriers: none  Stage of Readiness to Change: Maintenance    Other Core  Components/Tobacco Cessation Plan:   Goals:  Maintain non-smoking status    Comments on Goal Progression:  Non smoker.    Interventions:  Maintains non-smoking status    Education:    Risk Factors; verbalizes understanding; Date: 6/27/2025         Comments:   Encouraged patient to continue to work on achieving goals that were set.  Will continue to monitor.    Victoria Alexandra RN  Cardiac Rehab Nurse

## 2025-07-21 ENCOUNTER — CLINICAL SUPPORT (OUTPATIENT)
Dept: CARDIAC REHAB | Facility: CLINIC | Age: 55
End: 2025-07-21
Payer: COMMERCIAL

## 2025-07-21 DIAGNOSIS — Z95.1 POSTSURGICAL AORTOCORONARY BYPASS STATUS: Primary | ICD-10-CM

## 2025-07-21 DIAGNOSIS — I25.10 ATHEROSCLEROSIS OF NATIVE CORONARY ARTERY OF NATIVE HEART, UNSPECIFIED WHETHER ANGINA PRESENT: ICD-10-CM

## 2025-07-21 PROCEDURE — 93798 PHYS/QHP OP CAR RHAB W/ECG: CPT | Mod: S$GLB,,, | Performed by: INTERNAL MEDICINE

## 2025-07-23 ENCOUNTER — CLINICAL SUPPORT (OUTPATIENT)
Dept: CARDIAC REHAB | Facility: CLINIC | Age: 55
End: 2025-07-23
Payer: COMMERCIAL

## 2025-07-23 DIAGNOSIS — Z95.1 POSTSURGICAL AORTOCORONARY BYPASS STATUS: Primary | ICD-10-CM

## 2025-07-23 DIAGNOSIS — I25.10 CORONARY ATHEROSCLEROSIS OF NATIVE CORONARY ARTERY: ICD-10-CM

## 2025-07-23 PROCEDURE — 93798 PHYS/QHP OP CAR RHAB W/ECG: CPT | Mod: S$GLB,,, | Performed by: INTERNAL MEDICINE

## 2025-07-25 ENCOUNTER — CLINICAL SUPPORT (OUTPATIENT)
Dept: CARDIAC REHAB | Facility: CLINIC | Age: 55
End: 2025-07-25
Payer: COMMERCIAL

## 2025-07-25 DIAGNOSIS — I25.10 CORONARY ATHEROSCLEROSIS OF NATIVE CORONARY ARTERY: ICD-10-CM

## 2025-07-25 DIAGNOSIS — Z95.1 POSTSURGICAL AORTOCORONARY BYPASS STATUS: Primary | ICD-10-CM

## 2025-07-28 ENCOUNTER — DOCUMENTATION ONLY (OUTPATIENT)
Dept: CARDIAC REHAB | Facility: CLINIC | Age: 55
End: 2025-07-28

## 2025-07-28 ENCOUNTER — CLINICAL SUPPORT (OUTPATIENT)
Dept: CARDIAC REHAB | Facility: CLINIC | Age: 55
End: 2025-07-28
Payer: COMMERCIAL

## 2025-07-28 DIAGNOSIS — I25.10 ATHEROSCLEROSIS OF NATIVE CORONARY ARTERY OF NATIVE HEART, UNSPECIFIED WHETHER ANGINA PRESENT: ICD-10-CM

## 2025-07-28 DIAGNOSIS — Z95.1 POSTSURGICAL AORTOCORONARY BYPASS STATUS: Primary | ICD-10-CM

## 2025-07-28 PROCEDURE — 93798 PHYS/QHP OP CAR RHAB W/ECG: CPT | Mod: S$GLB,,, | Performed by: INTERNAL MEDICINE

## 2025-07-30 ENCOUNTER — CLINICAL SUPPORT (OUTPATIENT)
Dept: CARDIAC REHAB | Facility: CLINIC | Age: 55
End: 2025-07-30
Payer: COMMERCIAL

## 2025-07-30 DIAGNOSIS — I25.10 ATHEROSCLEROSIS OF NATIVE CORONARY ARTERY OF NATIVE HEART, UNSPECIFIED WHETHER ANGINA PRESENT: ICD-10-CM

## 2025-07-30 DIAGNOSIS — Z95.1 POSTSURGICAL AORTOCORONARY BYPASS STATUS: Primary | ICD-10-CM

## 2025-07-30 PROCEDURE — 93798 PHYS/QHP OP CAR RHAB W/ECG: CPT | Mod: S$GLB,,, | Performed by: INTERNAL MEDICINE

## 2025-08-01 ENCOUNTER — CLINICAL SUPPORT (OUTPATIENT)
Dept: CARDIAC REHAB | Facility: CLINIC | Age: 55
End: 2025-08-01
Payer: COMMERCIAL

## 2025-08-01 DIAGNOSIS — Z95.1 POSTSURGICAL AORTOCORONARY BYPASS STATUS: Primary | ICD-10-CM

## 2025-08-01 DIAGNOSIS — I25.10 CORONARY ATHEROSCLEROSIS OF NATIVE CORONARY ARTERY: ICD-10-CM

## 2025-08-04 ENCOUNTER — TELEPHONE (OUTPATIENT)
Dept: PRIMARY CARE CLINIC | Facility: CLINIC | Age: 55
End: 2025-08-04
Payer: COMMERCIAL

## 2025-08-04 NOTE — TELEPHONE ENCOUNTER
Copied from CRM #[id number]. Topic: General Inquiry - Patient Advice  >> Aug 4, 2025  2:53 PM Charles wrote:  .1MEDICALADVICE     Patient is calling for Medical Advice regarding: Pt called in regards to she states she was waiting to here back from Dr Weinberg about returning to work and a note please advise     How long has patient had these symptoms:N/A    Pharmacy name and phone#:N/A    Patient wants a call back or thru [facility], provide patient's call back phone number:callback     Comments:    Please advise patient replies from provider may take up to 48 hours.

## 2025-08-05 ENCOUNTER — TELEPHONE (OUTPATIENT)
Dept: PRIMARY CARE CLINIC | Facility: CLINIC | Age: 55
End: 2025-08-05
Payer: COMMERCIAL

## 2025-08-05 NOTE — LETTER
August 5, 2025      [facility] [address]  [place] [address]  Phone: [phone]  Fax: [phone]       Patient: Pat Yung   YOB: 1970      To Whom It May Concern:    Mart Yung  may return to work on 08/06/2025. Recommended no lifting over 10-15 lb; no prolonged standing or walking without rest; no pushing, pulling or carrying heavy objects; sedentary or desk work only. If you have any questions or concerns, or if I can be of further assistance, please do not hesitate to contact me.    Sincerely,    Jero Weinberg MD

## 2025-08-06 ENCOUNTER — CLINICAL SUPPORT (OUTPATIENT)
Dept: CARDIAC REHAB | Facility: CLINIC | Age: 55
End: 2025-08-06
Payer: COMMERCIAL

## 2025-08-06 DIAGNOSIS — Z95.1 POSTSURGICAL AORTOCORONARY BYPASS STATUS: Primary | ICD-10-CM

## 2025-08-06 DIAGNOSIS — I25.10 ATHEROSCLEROSIS OF NATIVE CORONARY ARTERY OF NATIVE HEART, UNSPECIFIED WHETHER ANGINA PRESENT: ICD-10-CM

## 2025-08-06 PROCEDURE — 93798 PHYS/QHP OP CAR RHAB W/ECG: CPT | Mod: S$GLB,,, | Performed by: INTERNAL MEDICINE

## 2025-08-07 ENCOUNTER — DOCUMENTATION ONLY (OUTPATIENT)
Dept: CARDIAC REHAB | Facility: CLINIC | Age: 55
End: 2025-08-07
Payer: COMMERCIAL

## 2025-08-07 NOTE — PROGRESS NOTES
Pat has completed 27 out of 36 exercise session of Phase II cardiac rehab.  A follow up reassessment will be completed at exit interview.    24 Session Follow Up     Cardiac Rehab Individual Treatment Plan - Reassessment      Patient Name: Pat Yung MRN: [id number]   : 1970   Age: 54 y.o.   Primary Diagnosis: CABG Date of Event: 25   EF: 60-65%  Risk Stratification: high  Referring Physician: HASEEB (OUTSIDE)   Exercise Assessment:     Angina with exercise?: No   ST Depression with Exercise?: No  Fall Risk: Yes   Assistive Devices:  independent  Pat stated at orientation there may be some limitations to exercise due to left lower back acute pain due to a fall.  Exercise modalities have been modified to meet the patient's needs and capabilities.  Comments on Progression: Pat is progressing well and her workloads will continue to be increased as she tolerates exercise intensity.    Exercise Plan:   Goals:  CR Exercise Goals: Attend Cardiac Rehab 3 times/week: In Progress  Home Aerobic Exercise: 2 additional days/week for 30-60 minutes: In Progress  Intensity of 12-15 on the Rate of Perceived Exertion (RPE) scale: In Progress  30% increase in entry estimated METS: 7.8 : In Progress  5 days/week for 30-60 minutes: In Progress  Demonstrate proper pulse taking technique: In Progress    Comments on Goal Progression:  Patient Consistency: consistent with attendance  Home exercise? Yes: Walking; Frequency: 2 non-rehab days per week; Duration 30 minutes  Patient reports intensity rate 12-14 on RPE scale  Patient is progressing steadily  Patient is Able to demonstrate proper pulse taking technique with or without a fitness tracker.      Intervention/Recommendations:   Discussed importance of regular attendance to cardiac rehab class    Exercise Prescription:  THR Range 81-94   Mode: Nustep  Elliptical   Frequency:  3 days/week   Duration:  30-60 minutes   Intensity:  12-15 RPE   Resistance  "Training:  Yes: 3 to 7 lb weights with 10-15 reps based on strength and range of motion and adjusted accordingly     Home Prescription:  Mode Aerobic exercise   Frequency: 2- 3 days/week   Duration: 30-60 minutes   Resistance Training: None     Education:  Resistance Training II; verbalizes understanding; Date: 25  Flexibility/Stretching; verbalizes understanding; Date: 25  After Phase II; verbalizes understanding; Date: 25  Body Composition; verbalizes understanding; Date: 25  Hydration; verbalizes understanding; Date: 25    Comments:  I reviewed exercise recommendations with Pat.  I encouraged her to continue walking at least 2 non-rehab days per week for at least 30 minutes.  She stated understanding.     The exercise prescription will continue to be adjusted based on tolerance of exercise intensity by patient.    Lopez Egan, [facility] [place]    24 Session Follow Up   Cardiac Rehab Individual Treatment Plan - Reassessment    Patient Name: Pat Yung MRN: [id number]   : 1970   Age: 54 y.o.   Primary Diagnosis: CABG    Nutrition Assessment:     Anthropometrics    Height 63 inches   Weight 205 lbs   BMI 36.3     Patient confirms she is taking lipitor 40mg for cholesterol control.  Difficulty Chewing or Swallowing: No  Current Exercise: See Exercise Physiologist Note  Food Safety/Food Preparation: spouse  Living Arrangements/Family Support: Lives with spouse  Cultural/Spiritual/Personal Preferences: not applicable   Barriers to Education: none identified  Stage of Change Related to Diet Habits: Maintenance  Recent Changes to Diet: No  Food Diary: Completed      Nutrition Plan:   Goals:  LDL-C < 70 (for high risk patients)  Hgb A1c < 7%  BMI < 25 and abdominal girth < 40M/<35 F  2 gram sodium, Mediterranean diet: In Progress  Rehab weight loss goal of 10 lbs, 1 lb per week: In Progress      Comments on Goal Progression:  Pt states she "feels like herself again". She is enjoying " Mediterranean diet and eating fish 3x weekly. She is very mindful of food intake and reading labels and monitoring portions/kcal to facilitate weight loss.    Interventions:  Dietitian Consult: No  Patient to participate in Cardiac Rehab sessions three times a week  Weekly Dietitian Weight Check  Nutrition Recommendations Provided: Verbal, Reviewed  Follow Up Plan for Ongoing Self-Management Support    Education:  Cholesterol and Fat; verbalizes understanding; Date: 25  Food and Mood; verbalizes understanding; Date: 25  Protein; verbalizes understanding; Date: 25  Sodium; verbalizes understanding; Date: 25    Comments:   Discussed ways to incorporate healthy snacks, eating on a schedule, and monitoring sodium intake for heart health.    Diabetes  Is the patient diabetic? No      Becca Jones MS, RDN/LDN    Session: 12 Session Follow Up   Cardiac Rehab Individual Treatment Plan - Reassessment      Patient Name: Pat Yung MRN: [id number]   : 1970   Age: 54 y.o.   Primary Diagnosis: CABG      Psychosocial Assessment:   Living Arrangements: Lives with spouse  Family Support: children, grandchildren, and spouse  Self Reported: decrease Effective Coping Skills  Displays: calmness  Medication: not applicable    Psychosocial Plan:   Goals:  Improved psychosocial coping strategies: In Progress  Maintain positive support system: Met  Maintain positive outlook: In Progress  Improve overall quality of life: In Progress    Comments on Goal Progression:  Patient will continue to work on achieving goals that were set.  She reports to be feeling physically & emotionally better since beginning cardiac rehab.  She feels that she has more energy than previously. She states that she has good support from her  & family.  She is attending lectures/exercise sessions on a consistent basis & is exercising about 15 minutes 2 days per week outside of cardiac rehab.  Encouraged for her to try to do at  least 30 minutes of exercise at home.      Interventions:  Patient to Self Report Emotional Changes at Session Check In  Recommend Physical Activity  Recommend Attending Education Lectures  Notify MD: No  Program Referral: No  Pharmaceutical Intervention/Therapy: No  Other Needs: not applicable  Stage of Readiness to Change: Action    Education:  Stress Management; verbalizes understanding; Date: 6/13/2025 & 6/27/2025  Stress; verbalizes understanding; Date: 6/13/2025 & 6/27/2025      She has been instructed to notify staff in the event that circumstances worsen.  Patient verbalizes understanding.    Other Core Components/Risk Factors Assessment:   RISK FACTORS:  hyperlipidemia, hypertension, obesity, positive family history    Learning Barriers: None    Medication Compliance: has been compliant with taking medications    Other Core Components/Risk Factors Plan:   Goals:  Decrease cholesterol level: In Progress  Increase exercise tolerance: In Progress  Increase knowledge of CAD: In Progress  Decrease blood pressure: Met  Weight loss: Met  Learn more about healthy eating: In Progress    Comments on Goal Progression:  Patient will continue to work on achieving goals that were set to decrease risk factors for CAD.  BP readings are at goal & she has lost 6 lbs thus far.  She is attending lectures/exercise sessions on a consistent basis.    Interventions:  Individual Education/ Counseling: Yes  Physician Referral: No    Education:    fluid overload/CHF, verbalizes understanding; Date: 6/27/2025  hypertension, verbalizes understanding; Date: 6/27/2025  physical activity, verbalizes understanding; Date: 6/27/2025  risk factors, verbalizes understanding; Date: 6/27/2025  sodium, verbalizes understanding; Date: 6/27/2025  stress, verbalizes understanding; Date: 6/13/2025 & 6/27/2025        Education method adapted to patients education level and preferred method of learning.  Method: explanation  handouts        Other Core  Components/Hypertension Assessment:   BP Range: 128-104/80-64  BP at Goal: Yes  Patient reported symptoms: none    Medications:  Medication Prescribed? Adherent? Exception   Beta-blocker [x]Yes  []No  []Unknown [x]Yes  []No  []Unknown    ACEI/ARB []Yes  [x]No  []Unknown []Yes  []No  []Unknown    Calcium Channel Blocker [x]Yes  []No  []Unknown [x]Yes  []No  []Unknown    Diuretic []Yes  [x]No  []Unknown []Yes  []No  []Unknown        Other Core Components/Hypertension Plan:   Goals:  Blood Pressure <130/80    Comments on Goal Progression:  Patient will continue to work on keeping BP at or below goal of 130/80.  She is monitoring BP at home & is obtaining similar readings to those obtained in cardiac rehab.  She will be following recommendations to keep control of BP.  She is monitoring her sodium intake carefully.    Interventions:  Med Card Reconciled: Yes  Encourage medication compliance  Encourage sodium reduction  Encourage weight loss  Recommend physical activity  Educate on contributory factors  Reduce stress, anxiety, anger, depression, and/or chronic pain  Encourage home blood pressure monitoring  Recommend daily weights    Education:    Hypertension; verbalizes understanding; Date: 6/27/2025  Coronary Artery Disease; verbalizes understanding; Date: 6/27/2025  Risk Factors; verbalizes understanding; Date: 6/27/2025  Medication I; verbalizes understanding; Date: 6/20/2025  Stress; verbalizes understanding; Date: 6/13/2025 & 6/27/2025        Does the patient have Heart Failure? No      Other Core Components/Tobacco Cessation Assessment:   Smoking Status: Lifetime Non-smoker  Primary Tobacco Type: N/A  Tobacco Usage: no  Smoking Cessation Barriers: none  Stage of Readiness to Change: Maintenance    Other Core Components/Tobacco Cessation Plan:   Goals:  Maintain non-smoking status    Comments on Goal Progression:  Non smoker.    Interventions:  Maintains non-smoking status    Education:    Risk Factors; verbalizes  understanding; Date: 6/27/2025         Comments:   Encouraged patient to continue to work on achieving goals that were set.  Will continue to monitor.    Victoria Alexandra RN  Cardiac Rehab Nurse

## 2025-08-08 ENCOUNTER — CLINICAL SUPPORT (OUTPATIENT)
Dept: CARDIAC REHAB | Facility: CLINIC | Age: 55
End: 2025-08-08
Payer: COMMERCIAL

## 2025-08-08 DIAGNOSIS — Z95.1 POSTSURGICAL AORTOCORONARY BYPASS STATUS: Primary | ICD-10-CM

## 2025-08-08 DIAGNOSIS — I25.10 CORONARY ATHEROSCLEROSIS OF NATIVE CORONARY ARTERY: ICD-10-CM

## 2025-08-11 ENCOUNTER — CLINICAL SUPPORT (OUTPATIENT)
Dept: CARDIAC REHAB | Facility: CLINIC | Age: 55
End: 2025-08-11
Payer: COMMERCIAL

## 2025-08-11 DIAGNOSIS — Z95.1 POSTSURGICAL AORTOCORONARY BYPASS STATUS: Primary | ICD-10-CM

## 2025-08-11 DIAGNOSIS — I25.10 ATHEROSCLEROSIS OF NATIVE CORONARY ARTERY OF NATIVE HEART WITHOUT ANGINA PECTORIS: ICD-10-CM

## 2025-08-11 PROCEDURE — 93798 PHYS/QHP OP CAR RHAB W/ECG: CPT | Mod: S$GLB,,, | Performed by: INTERNAL MEDICINE

## 2025-08-13 ENCOUNTER — CLINICAL SUPPORT (OUTPATIENT)
Dept: CARDIAC REHAB | Facility: CLINIC | Age: 55
End: 2025-08-13
Payer: COMMERCIAL

## 2025-08-13 DIAGNOSIS — Z95.1 POSTSURGICAL AORTOCORONARY BYPASS STATUS: Primary | ICD-10-CM

## 2025-08-13 DIAGNOSIS — I25.10 ATHEROSCLEROSIS OF NATIVE CORONARY ARTERY OF NATIVE HEART WITHOUT ANGINA PECTORIS: ICD-10-CM

## 2025-08-13 PROCEDURE — 93798 PHYS/QHP OP CAR RHAB W/ECG: CPT | Mod: S$GLB,,, | Performed by: INTERNAL MEDICINE

## 2025-08-15 ENCOUNTER — CLINICAL SUPPORT (OUTPATIENT)
Dept: CARDIAC REHAB | Facility: CLINIC | Age: 55
End: 2025-08-15
Payer: COMMERCIAL

## 2025-08-15 DIAGNOSIS — I25.10 CORONARY ARTERY DISEASE, UNSPECIFIED VESSEL OR LESION TYPE, UNSPECIFIED WHETHER ANGINA PRESENT, UNSPECIFIED WHETHER NATIVE OR TRANSPLANTED HEART: ICD-10-CM

## 2025-08-15 DIAGNOSIS — Z95.1 POSTSURGICAL AORTOCORONARY BYPASS STATUS: Primary | ICD-10-CM

## 2025-08-18 ENCOUNTER — CLINICAL SUPPORT (OUTPATIENT)
Dept: CARDIAC REHAB | Facility: CLINIC | Age: 55
End: 2025-08-18
Payer: COMMERCIAL

## 2025-08-18 DIAGNOSIS — Z95.1 POSTSURGICAL AORTOCORONARY BYPASS STATUS: Primary | ICD-10-CM

## 2025-08-18 DIAGNOSIS — I25.10 CORONARY ATHEROSCLEROSIS OF NATIVE CORONARY ARTERY: ICD-10-CM

## 2025-08-18 PROCEDURE — 93798 PHYS/QHP OP CAR RHAB W/ECG: CPT | Mod: S$GLB,,, | Performed by: INTERNAL MEDICINE

## 2025-08-20 ENCOUNTER — CLINICAL SUPPORT (OUTPATIENT)
Dept: CARDIAC REHAB | Facility: CLINIC | Age: 55
End: 2025-08-20
Payer: COMMERCIAL

## 2025-08-20 DIAGNOSIS — I25.10 ATHEROSCLEROSIS OF NATIVE CORONARY ARTERY OF NATIVE HEART WITHOUT ANGINA PECTORIS: ICD-10-CM

## 2025-08-20 DIAGNOSIS — Z95.1 POSTSURGICAL AORTOCORONARY BYPASS STATUS: Primary | ICD-10-CM

## 2025-08-20 PROCEDURE — 93798 PHYS/QHP OP CAR RHAB W/ECG: CPT | Mod: S$GLB,,, | Performed by: INTERNAL MEDICINE

## 2025-08-22 ENCOUNTER — CLINICAL SUPPORT (OUTPATIENT)
Dept: CARDIAC REHAB | Facility: CLINIC | Age: 55
End: 2025-08-22
Payer: COMMERCIAL

## 2025-08-22 DIAGNOSIS — I25.10 ATHEROSCLEROSIS OF NATIVE CORONARY ARTERY OF NATIVE HEART WITHOUT ANGINA PECTORIS: ICD-10-CM

## 2025-08-22 DIAGNOSIS — Z95.1 POSTSURGICAL AORTOCORONARY BYPASS STATUS: Primary | ICD-10-CM

## 2025-08-25 ENCOUNTER — CLINICAL SUPPORT (OUTPATIENT)
Dept: CARDIAC REHAB | Facility: CLINIC | Age: 55
End: 2025-08-25
Payer: COMMERCIAL

## 2025-08-25 DIAGNOSIS — Z95.1 POSTSURGICAL AORTOCORONARY BYPASS STATUS: Primary | ICD-10-CM

## 2025-08-25 DIAGNOSIS — I25.10 ATHEROSCLEROSIS OF NATIVE CORONARY ARTERY OF NATIVE HEART WITHOUT ANGINA PECTORIS: ICD-10-CM

## 2025-08-25 PROCEDURE — 99999 PR PBB SHADOW E&M-EST. PATIENT-LVL I: CPT | Mod: PBBFAC,,,

## 2025-08-25 PROCEDURE — 93798 PHYS/QHP OP CAR RHAB W/ECG: CPT | Mod: S$GLB,,, | Performed by: INTERNAL MEDICINE

## 2025-08-28 ENCOUNTER — DOCUMENTATION ONLY (OUTPATIENT)
Dept: CARDIAC REHAB | Facility: CLINIC | Age: 55
End: 2025-08-28
Payer: COMMERCIAL

## 2025-08-28 ENCOUNTER — HOSPITAL ENCOUNTER (OUTPATIENT)
Dept: CARDIOLOGY | Facility: HOSPITAL | Age: 55
Discharge: HOME | End: 2025-08-28
Attending: INTERNAL MEDICINE
Payer: COMMERCIAL

## 2025-08-28 VITALS
BODY MASS INDEX: 36.44 KG/M2 | HEART RATE: 59 BPM | SYSTOLIC BLOOD PRESSURE: 126 MMHG | WEIGHT: 205.69 LBS | DIASTOLIC BLOOD PRESSURE: 70 MMHG

## 2025-08-28 DIAGNOSIS — I25.10 ATHEROSCLEROSIS OF NATIVE CORONARY ARTERY OF NATIVE HEART, UNSPECIFIED WHETHER ANGINA PRESENT: ICD-10-CM

## 2025-08-28 DIAGNOSIS — Z95.1 POSTSURGICAL AORTOCORONARY BYPASS STATUS: ICD-10-CM

## 2025-08-28 LAB
CV STRESS BASE HR: 59 BPM
DIASTOLIC BLOOD PRESSURE: 70 MMHG
OHS CV CPX 1 MINUTE RECOVERY HEART RATE: 113 BPM
OHS CV CPX 85 PERCENT MAX PREDICTED HEART RATE MALE: 141
OHS CV CPX ABDOMINAL GIRTH: 43 CM
OHS CV CPX ANAEROBIC THRESHOLD DIASTOLIC BLOOD PRESSURE: 72 MMHG
OHS CV CPX ANAEROBIC THRESHOLD HEART RATE: 85
OHS CV CPX ANAEROBIC THRESHOLD RATE PRESSURE PRODUCT: NORMAL
OHS CV CPX ANAEROBIC THRESHOLD SYSTOLIC BLOOD PRESSURE: 137
OHS CV CPX DATA GRADE - AT: 4.9
OHS CV CPX DATA GRADE - PEAK: 10.4
OHS CV CPX DATA O2 SAT - PEAK: 99
OHS CV CPX DATA O2 SAT - REST: 97
OHS CV CPX DATA SPEED - AT: 1.9
OHS CV CPX DATA SPEED - PEAK: 3
OHS CV CPX DATA TIME - AT: 2.35
OHS CV CPX DATA TIME - PEAK: 5
OHS CV CPX DATA VE/VCO2 - AT: 30
OHS CV CPX DATA VE/VCO2 - PEAK: 30
OHS CV CPX DATA VE/VO2 - AT: 20
OHS CV CPX DATA VE/VO2 - PEAK: 27
OHS CV CPX DATA VO2 - AT: 9.8
OHS CV CPX DATA VO2 - PEAK: 16.7
OHS CV CPX DATA VO2 - REST: 2
OHS CV CPX ESTIMATED METS: 7
OHS CV CPX FEV1/FVC: 0.76
OHS CV CPX FORCED EXPIRATORY VOLUME: 1.52
OHS CV CPX FORCED VITAL CAPACITY (FVC): 2.01
OHS CV CPX HIGHEST VO: 30.2
OHS CV CPX MAX PREDICTED HEART RATE: 166
OHS CV CPX MAXIMAL VOLUNTARY VENTILATION (MVV) PREDICTED: 60.8
OHS CV CPX MAXIMAL VOLUNTARY VENTILATION (MVV): 61
OHS CV CPX MAXIUMUM EXERCISE VENTILATION (VE MAX): 35.6
OHS CV CPX PATIENT AGE: 54
OHS CV CPX PATIENT IS FEMALE AGE 11-19: 0
OHS CV CPX PATIENT IS FEMALE AGE GREATER THAN 19: 1
OHS CV CPX PATIENT IS FEMALE AGE LESS THAN 11: 0
OHS CV CPX PATIENT IS FEMALE: 1
OHS CV CPX PATIENT IS MALE AGE 11-25: 0
OHS CV CPX PATIENT IS MALE AGE GREATER THAN 25: 0
OHS CV CPX PATIENT IS MALE AGE LESS THAN 11: 0
OHS CV CPX PATIENT IS MALE GREATER THAN 18: 0
OHS CV CPX PATIENT IS MALE LESS THAN OR EQUAL TO 18: 0
OHS CV CPX PATIENT IS MALE: 0
OHS CV CPX PATIENT WEIGHT RETURNED IN OZ: 3291.03
OHS CV CPX PEAK DIASTOLIC BLOOD PRESSURE: 71 MMHG
OHS CV CPX PEAK HEAR RATE: 127 BPM
OHS CV CPX PEAK RATE PRESSURE PRODUCT: NORMAL
OHS CV CPX PEAK SYSTOLIC BLOOD PRESSURE: 168 MMHG
OHS CV CPX PERCENT BODY FAT: 29.2
OHS CV CPX PERCENT MAX PREDICTED HEART RATE ACHIEVED: 80
OHS CV CPX PREDICTED VO2: 30.2 ML/KG/MIN
OHS CV CPX RATE PRESSURE PRODUCT PRESENTING: 7434
OHS CV CPX REST PET CO2: 28
OHS CV CPX VE/VCO2 SLOPE: 31.4
STRESS ECHO POST EXERCISE DUR MIN: 5 MINUTES
STRESS ECHO POST EXERCISE DUR SEC: 0 SECONDS
SYSTOLIC BLOOD PRESSURE: 126 MMHG

## 2025-08-28 PROCEDURE — 94621 CARDIOPULM EXERCISE TESTING: CPT

## 2025-08-28 PROCEDURE — 94621 CARDIOPULM EXERCISE TESTING: CPT | Mod: 26,,, | Performed by: INTERNAL MEDICINE

## 2025-08-29 ENCOUNTER — TELEPHONE (OUTPATIENT)
Dept: CARDIOLOGY | Facility: CLINIC | Age: 55
End: 2025-08-29
Payer: COMMERCIAL

## 2025-09-04 ENCOUNTER — TELEPHONE (OUTPATIENT)
Dept: CARDIAC REHAB | Facility: CLINIC | Age: 55
End: 2025-09-04
Payer: COMMERCIAL

## 2025-09-05 ENCOUNTER — CLINICAL SUPPORT (OUTPATIENT)
Dept: CARDIAC REHAB | Facility: CLINIC | Age: 55
End: 2025-09-05
Payer: COMMERCIAL

## 2025-09-05 DIAGNOSIS — Z95.1 POSTSURGICAL AORTOCORONARY BYPASS STATUS: Primary | ICD-10-CM

## 2025-09-05 DIAGNOSIS — I25.10 CORONARY ARTERY DISEASE, UNSPECIFIED VESSEL OR LESION TYPE, UNSPECIFIED WHETHER ANGINA PRESENT, UNSPECIFIED WHETHER NATIVE OR TRANSPLANTED HEART: ICD-10-CM

## 2025-09-05 PROCEDURE — 99999 PR PBB SHADOW E&M-EST. PATIENT-LVL III: CPT | Mod: PBBFAC,,,

## (undated) DEVICE — DRAPE SLUSH WARMER WITH DISC

## (undated) DEVICE — SUT 6 18IN STEEL MONO CCS

## (undated) DEVICE — BLADE SAW STERNAL 5/BX

## (undated) DEVICE — TUBING HF INSUFFLATION W/ FLTR

## (undated) DEVICE — TRAY HEART OMC

## (undated) DEVICE — BLADE 4IN EDGE INSULATED

## (undated) DEVICE — DECANTER FLUID TRNSF WHITE 9IN

## (undated) DEVICE — SUT VICRYL 3-0 27 SH

## (undated) DEVICE — KIT SAHARA DRAPE DRAW/LIFT

## (undated) DEVICE — WIRE INTRAMYOCARDIAL TEMP

## (undated) DEVICE — DRESSING ADH ISLAND 3.6 X 14

## (undated) DEVICE — PLEDGET SUT SOFT 3/8X3/16X1/16

## (undated) DEVICE — PROBE CATH TEMP 16 FRFOLEY 400

## (undated) DEVICE — SUT SILK 2-0 SH 18IN BLACK

## (undated) DEVICE — SUT LIGACLIP SMALL XTRA

## (undated) DEVICE — GOWN POLY REINF BRTH SLV XL

## (undated) DEVICE — SYS VIRTUOSAPH PLUS EVM

## (undated) DEVICE — SUT PROLENE 7-0 BV-1 30

## (undated) DEVICE — CLIP SPRING 6MM

## (undated) DEVICE — SPONGE COTTON TRAY 4X4IN

## (undated) DEVICE — SUT PROLENE 4-0 RB-1 BL MO

## (undated) DEVICE — BANDAGE ELAS SOFTWRAP ST 4X5YD

## (undated) DEVICE — SPONGE GAUZE 16PLY 4X4

## (undated) DEVICE — KIT URINARY CATH URINE METER

## (undated) DEVICE — DRESSING AQUACEL SACRAL 9 X 9

## (undated) DEVICE — SUT PROLENE 4-0 SH BLU 36IN

## (undated) DEVICE — SUT VICRYL BR 1 GEN 27 CT-1

## (undated) DEVICE — ELECTRODE MEGADYNE RETURN DUAL

## (undated) DEVICE — SUT SILK BLK BR. 2 2-60

## (undated) DEVICE — PLEDGET TFE POLYMER 3/16

## (undated) DEVICE — TRAY CATH 1-LYR URIMTR 16FR

## (undated) DEVICE — GLOVE BIOGEL PI MICRO SZ 7.5

## (undated) DEVICE — CANNULA VESSEL FREE FLOW

## (undated) DEVICE — PAD CURAD NONADH 3X4IN

## (undated) DEVICE — INSERTS STEALTH FIBRA SIZE 5

## (undated) DEVICE — BLOWER MISTER

## (undated) DEVICE — SUT 2/0 36IN COATED VICRYL

## (undated) DEVICE — TIP YANKAUERS BULB NO VENT

## (undated) DEVICE — SUT PROLENE 5-0 BL C-1 4-24

## (undated) DEVICE — SYR 3CC LUER LOC

## (undated) DEVICE — Device

## (undated) DEVICE — SUT 4-0 12-18IN SILK BLACK

## (undated) DEVICE — BANDAGE ELAS SOFTWRAP ST 6X5YD

## (undated) DEVICE — DRAPE CVMAX SPLIT ANES SCRN

## (undated) DEVICE — RETRACTOR OCTOBASE INSERT HOLD

## (undated) DEVICE — NDL HYPO STD REG BVL 18GX1.5IN

## (undated) DEVICE — DRAIN CHEST DRY SUCTION

## (undated) DEVICE — BLANKET HYPER ADULT 24X60IN

## (undated) DEVICE — ELECTRODE MULTI-FUNC ADULTSTAT

## (undated) DEVICE — DRESSING TRANS 4X4 TEGADERM